# Patient Record
Sex: MALE | Race: WHITE | Employment: OTHER | URBAN - METROPOLITAN AREA
[De-identification: names, ages, dates, MRNs, and addresses within clinical notes are randomized per-mention and may not be internally consistent; named-entity substitution may affect disease eponyms.]

---

## 2020-06-10 ENCOUNTER — HOSPITAL ENCOUNTER (EMERGENCY)
Age: 80
Discharge: HOME | End: 2020-06-10
Attending: EMERGENCY MEDICINE
Payer: OTHER MISCELLANEOUS

## 2020-06-10 VITALS
HEART RATE: 87 BPM | BODY MASS INDEX: 31.5 KG/M2 | SYSTOLIC BLOOD PRESSURE: 128 MMHG | HEIGHT: 70 IN | OXYGEN SATURATION: 96 % | WEIGHT: 220 LBS | TEMPERATURE: 98.1 F | DIASTOLIC BLOOD PRESSURE: 60 MMHG | RESPIRATION RATE: 18 BRPM

## 2020-06-10 PROCEDURE — 90471 IMMUNIZATION ADMIN: CPT | Performed by: EMERGENCY MEDICINE

## 2020-06-10 PROCEDURE — 90715 TDAP VACCINE 7 YRS/> IM: CPT | Performed by: EMERGENCY MEDICINE

## 2020-06-10 PROCEDURE — 6360000002 HC RX W HCPCS: Performed by: EMERGENCY MEDICINE

## 2020-06-10 PROCEDURE — 99283 EMERGENCY DEPT VISIT LOW MDM: CPT

## 2020-06-10 RX ORDER — ASPIRIN 81 MG/1
81 TABLET, CHEWABLE ORAL DAILY
COMMUNITY

## 2020-06-10 RX ORDER — FUROSEMIDE 40 MG/1
40 TABLET ORAL 2 TIMES DAILY
COMMUNITY

## 2020-06-10 RX ORDER — GABAPENTIN 300 MG/1
300 CAPSULE ORAL 3 TIMES DAILY
COMMUNITY

## 2020-06-10 RX ORDER — LOSARTAN POTASSIUM 100 MG/1
100 TABLET ORAL DAILY
COMMUNITY

## 2020-06-10 RX ORDER — CELECOXIB 100 MG/1
100 CAPSULE ORAL 2 TIMES DAILY
COMMUNITY

## 2020-06-10 RX ORDER — METOPROLOL TARTRATE 100 MG/1
100 TABLET ORAL 2 TIMES DAILY
COMMUNITY

## 2020-06-10 RX ORDER — TAMSULOSIN HYDROCHLORIDE 0.4 MG/1
0.4 CAPSULE ORAL DAILY
COMMUNITY

## 2020-06-10 RX ORDER — ACETAMINOPHEN 160 MG
TABLET,DISINTEGRATING ORAL
COMMUNITY

## 2020-06-10 RX ORDER — DIAPER,BRIEF,INFANT-TODD,DISP
EACH MISCELLANEOUS 2 TIMES DAILY
Status: DISCONTINUED | OUTPATIENT
Start: 2020-06-10 | End: 2020-06-10 | Stop reason: HOSPADM

## 2020-06-10 RX ORDER — WARFARIN SODIUM 2.5 MG/1
2.5 TABLET ORAL
COMMUNITY

## 2020-06-10 RX ORDER — ROSUVASTATIN CALCIUM 10 MG/1
10 TABLET, COATED ORAL DAILY
COMMUNITY

## 2020-06-10 RX ORDER — AMLODIPINE BESYLATE 5 MG/1
5 TABLET ORAL DAILY
COMMUNITY

## 2020-06-10 RX ADMIN — TETANUS TOXOID, REDUCED DIPHTHERIA TOXOID AND ACELLULAR PERTUSSIS VACCINE, ADSORBED 0.5 ML: 5; 2.5; 8; 8; 2.5 SUSPENSION INTRAMUSCULAR at 18:01

## 2020-06-10 ASSESSMENT — ENCOUNTER SYMPTOMS
VOMITING: 0
CHEST TIGHTNESS: 0
CONSTIPATION: 0
EYE REDNESS: 0
CHOKING: 0
TROUBLE SWALLOWING: 0
VOICE CHANGE: 0
COUGH: 0
SORE THROAT: 0
ABDOMINAL PAIN: 0
EYE PAIN: 0
BACK PAIN: 0
FACIAL SWELLING: 0
SINUS PRESSURE: 0
STRIDOR: 0
BLOOD IN STOOL: 0
DIARRHEA: 0
SHORTNESS OF BREATH: 0
WHEEZING: 0
EYE DISCHARGE: 0

## 2020-06-10 ASSESSMENT — PAIN DESCRIPTION - ORIENTATION: ORIENTATION: LEFT;LOWER

## 2020-06-10 ASSESSMENT — PAIN DESCRIPTION - PAIN TYPE: TYPE: ACUTE PAIN

## 2020-06-10 ASSESSMENT — PAIN DESCRIPTION - DESCRIPTORS: DESCRIPTORS: ACHING

## 2020-06-10 ASSESSMENT — PAIN SCALES - GENERAL: PAINLEVEL_OUTOF10: 1

## 2020-06-10 ASSESSMENT — PAIN DESCRIPTION - LOCATION: LOCATION: BACK;ELBOW

## 2020-06-10 ASSESSMENT — PAIN DESCRIPTION - FREQUENCY: FREQUENCY: CONTINUOUS
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
## 2023-02-09 PROBLEM — F41.9 ANXIETY DISORDER: Status: ACTIVE | Noted: 2023-02-08

## 2023-02-09 PROBLEM — L73.9 ACUTE FOLLICULITIS: Status: ACTIVE | Noted: 2023-02-08

## 2023-02-09 PROBLEM — M77.8 SHOULDER CAPSULITIS, RIGHT: Status: ACTIVE | Noted: 2023-02-09

## 2023-02-09 PROBLEM — M25.551 RIGHT HIP PAIN: Status: ACTIVE | Noted: 2023-02-09
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
## 2023-02-09 PROBLEM — M70.62 TROCHANTERIC BURSITIS OF LEFT HIP: Status: ACTIVE | Noted: 2023-02-09

## 2023-02-09 PROBLEM — M47.22 OSTEOARTHRITIS OF SPINE WITH RADICULOPATHY, CERVICAL REGION: Status: ACTIVE | Noted: 2023-02-09

## 2023-02-09 PROBLEM — H61.23 BILATERAL IMPACTED CERUMEN: Status: ACTIVE | Noted: 2023-02-08

## 2023-02-09 PROBLEM — J04.0 LARYNGITIS: Status: ACTIVE | Noted: 2023-02-09

## 2023-02-09 PROBLEM — M65.9 TENOSYNOVITIS OF WRIST: Status: ACTIVE | Noted: 2023-02-09

## 2023-02-09 PROBLEM — E87.6 HYPOKALEMIA: Status: ACTIVE | Noted: 2023-02-08

## 2023-02-09 PROBLEM — C43.9 MELANOMA (MULTI): Status: ACTIVE | Noted: 2023-02-09

## 2023-02-09 PROBLEM — R26.9 ABNORMAL GAIT: Status: ACTIVE | Noted: 2023-02-08

## 2023-02-09 PROBLEM — M25.552 PAIN, JOINT, HIP, LEFT: Status: ACTIVE | Noted: 2023-02-09

## 2023-02-09 PROBLEM — H26.9 CATARACT, LEFT EYE: Status: ACTIVE | Noted: 2023-02-08

## 2023-02-09 PROBLEM — M25.539 WRIST PAIN: Status: ACTIVE | Noted: 2023-02-09

## 2023-02-09 PROBLEM — M48.061 SPINAL STENOSIS OF LUMBAR REGION: Status: ACTIVE | Noted: 2023-02-09

## 2023-02-09 PROBLEM — M25.432 SWELLING OF JOINT OF BOTH WRISTS: Status: ACTIVE | Noted: 2023-02-09

## 2023-02-09 PROBLEM — D23.9 DYSPLASTIC NEVUS: Status: ACTIVE | Noted: 2023-02-08

## 2023-02-09 PROBLEM — R21 RASH OF GROIN: Status: ACTIVE | Noted: 2023-02-08

## 2023-02-09 PROBLEM — Z98.818 OTHER DENTAL PROCEDURE STATUS: Status: ACTIVE | Noted: 2023-02-09

## 2023-02-09 PROBLEM — S16.1XXA STRAIN OF NECK MUSCLE: Status: ACTIVE | Noted: 2023-02-09

## 2023-02-09 PROBLEM — E11.9 DIABETES MELLITUS TYPE 2, UNCOMPLICATED (MULTI): Status: ACTIVE | Noted: 2023-02-08

## 2023-02-09 PROBLEM — M25.469 EFFUSION OF KNEE JOINT: Status: ACTIVE | Noted: 2023-02-08

## 2023-02-09 PROBLEM — M79.7 FIBROMYALGIA: Status: ACTIVE | Noted: 2023-02-08

## 2023-02-09 PROBLEM — M54.32 LEFT SIDED SCIATICA: Status: ACTIVE | Noted: 2023-02-09

## 2023-02-09 PROBLEM — S50.312A ABRASION OF LEFT ELBOW: Status: ACTIVE | Noted: 2023-02-08

## 2023-02-09 PROBLEM — K60.2 RECTAL FISSURE: Status: ACTIVE | Noted: 2023-02-09

## 2023-02-09 PROBLEM — B07.8 COMMON WART: Status: ACTIVE | Noted: 2023-02-09

## 2023-02-09 PROBLEM — R53.83 FATIGUE: Status: ACTIVE | Noted: 2023-02-08

## 2023-02-09 PROBLEM — E55.9 VITAMIN D DEFICIENCY: Status: ACTIVE | Noted: 2023-02-09

## 2023-02-09 PROBLEM — M65.939 TENOSYNOVITIS OF WRIST: Status: ACTIVE | Noted: 2023-02-09

## 2023-02-09 PROBLEM — I10 HYPERTENSION: Status: ACTIVE | Noted: 2023-02-08

## 2023-02-09 PROBLEM — B07.9 VIRAL WART, UNSPECIFIED: Status: ACTIVE | Noted: 2023-02-09

## 2023-02-09 PROBLEM — K64.9 BLEEDING HEMORRHOIDS: Status: ACTIVE | Noted: 2023-02-08

## 2023-02-09 PROBLEM — R22.9 MULTIPLE SKIN NODULES: Status: ACTIVE | Noted: 2023-02-08

## 2023-02-09 PROBLEM — K62.89 OTHER SPECIFIED DISEASES OF ANUS AND RECTUM: Status: ACTIVE | Noted: 2023-02-09

## 2023-02-09 PROBLEM — S40.012A CONTUSION OF LEFT SHOULDER: Status: ACTIVE | Noted: 2023-02-08

## 2023-02-09 PROBLEM — Z85.820 HISTORY OF MALIGNANT MELANOMA OF SKIN: Status: ACTIVE | Noted: 2023-02-08

## 2023-02-09 PROBLEM — M54.17 LUMBOSACRAL RADICULITIS: Status: ACTIVE | Noted: 2023-02-09
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
## 2023-02-09 PROBLEM — B07.9 VERRUCOUS SKIN LESION: Status: ACTIVE | Noted: 2023-02-09

## 2023-02-09 PROBLEM — R60.0 BILATERAL LEG EDEMA: Status: ACTIVE | Noted: 2023-02-08

## 2023-02-09 PROBLEM — J01.00 ACUTE MAXILLARY SINUSITIS: Status: ACTIVE | Noted: 2023-02-08

## 2023-02-09 PROBLEM — J18.0 BRONCHOPNEUMONIA: Status: ACTIVE | Noted: 2023-02-08

## 2023-02-09 PROBLEM — M25.431 SWELLING OF JOINT OF BOTH WRISTS: Status: ACTIVE | Noted: 2023-02-09

## 2023-02-09 PROBLEM — E78.5 DYSLIPIDEMIA: Status: ACTIVE | Noted: 2023-02-08

## 2023-02-09 PROBLEM — L91.8 SKIN TAG: Status: ACTIVE | Noted: 2023-02-09

## 2023-02-09 PROBLEM — Z98.890 STATUS POST LUMBAR SPINE OPERATIVE PROCEDURE FOR DECOMPRESSION OF SPINAL CORD: Status: ACTIVE | Noted: 2023-02-09

## 2023-02-09 PROBLEM — I25.10 CORONARY ARTERY DISEASE: Status: ACTIVE | Noted: 2023-02-08

## 2023-02-09 PROBLEM — S23.9XXA SPRAIN OF THORACIC REGION: Status: ACTIVE | Noted: 2023-02-09

## 2023-02-09 PROBLEM — R05.9 COUGH: Status: ACTIVE | Noted: 2023-02-08

## 2023-02-09 PROBLEM — I50.32 CHRONIC DIASTOLIC CONGESTIVE HEART FAILURE (MULTI): Status: ACTIVE | Noted: 2023-02-08

## 2023-02-09 PROBLEM — M77.8 THUMB TENDONITIS: Status: ACTIVE | Noted: 2023-02-09

## 2023-02-09 PROBLEM — M19.90 INFLAMMATORY ARTHRITIS: Status: ACTIVE | Noted: 2023-02-09

## 2023-02-09 PROBLEM — L72.3 SEBACEOUS CYST OF EAR: Status: ACTIVE | Noted: 2023-02-09

## 2023-02-09 PROBLEM — M25.512 LEFT SHOULDER PAIN: Status: ACTIVE | Noted: 2023-02-09

## 2023-02-09 PROBLEM — K62.5 RECTAL BLEEDING: Status: ACTIVE | Noted: 2023-02-09

## 2023-02-09 PROBLEM — Z95.5 HISTORY OF CORONARY ARTERY STENT PLACEMENT: Status: ACTIVE | Noted: 2023-02-08

## 2023-02-09 PROBLEM — M99.01 CERVICAL SEGMENT DYSFUNCTION: Status: ACTIVE | Noted: 2023-02-08

## 2023-02-09 PROBLEM — I34.0 MODERATE MITRAL REGURGITATION: Status: ACTIVE | Noted: 2023-02-09

## 2023-02-09 PROBLEM — S51.812A SKIN TEAR OF LEFT FOREARM WITHOUT COMPLICATION: Status: ACTIVE | Noted: 2023-02-09

## 2023-02-09 PROBLEM — M17.11 RIGHT KNEE DJD: Status: ACTIVE | Noted: 2023-02-09

## 2023-02-09 PROBLEM — J18.9 PNEUMONIA: Status: ACTIVE | Noted: 2023-02-08

## 2023-02-09 PROBLEM — E53.8 VITAMIN B12 DEFICIENCY: Status: ACTIVE | Noted: 2023-02-09

## 2023-02-09 PROBLEM — M46.1 SACROILIITIS (CMS-HCC): Status: ACTIVE | Noted: 2023-02-09

## 2023-02-09 PROBLEM — E78.5 HYPERLIPIDEMIA: Status: ACTIVE | Noted: 2023-02-08

## 2023-02-09 PROBLEM — I35.0 MILD AORTIC STENOSIS: Status: ACTIVE | Noted: 2023-02-09

## 2023-02-09 PROBLEM — R10.11 ABDOMINAL PAIN, RUQ (RIGHT UPPER QUADRANT): Status: ACTIVE | Noted: 2023-02-08

## 2023-02-09 PROBLEM — H25.093 AGE-RELATED INCIPIENT CATARACT OF BOTH EYES: Status: ACTIVE | Noted: 2023-02-08

## 2023-02-09 PROBLEM — L03.311 CELLULITIS OF RIGHT ABDOMINAL WALL: Status: ACTIVE | Noted: 2023-02-08

## 2023-02-09 PROBLEM — M25.512 ACUTE PAIN OF LEFT SHOULDER: Status: ACTIVE | Noted: 2023-02-09

## 2023-02-09 PROBLEM — I48.0 PAROXYSMAL ATRIAL FIBRILLATION (MULTI): Status: ACTIVE | Noted: 2023-02-09

## 2023-02-09 PROBLEM — M10.9 GOUT, ARTHRITIS: Status: ACTIVE | Noted: 2023-02-08

## 2023-02-09 PROBLEM — N40.0 BPH (BENIGN PROSTATIC HYPERPLASIA): Status: ACTIVE | Noted: 2023-02-08

## 2023-02-09 PROBLEM — L57.0 BENIGN KERATOSIS: Status: ACTIVE | Noted: 2023-02-08

## 2023-02-09 PROBLEM — B35.6 TINEA CRURIS: Status: ACTIVE | Noted: 2023-02-08

## 2023-02-09 PROBLEM — J06.9 UPPER RESPIRATORY INFECTION WITH COUGH AND CONGESTION: Status: ACTIVE | Noted: 2023-02-09

## 2023-02-09 PROBLEM — F45.21 HYPOCHONDRIASIS: Status: ACTIVE | Noted: 2023-02-08

## 2023-02-09 PROBLEM — L98.499: Status: ACTIVE | Noted: 2023-02-09

## 2023-02-09 PROBLEM — K59.00 CONSTIPATION: Status: ACTIVE | Noted: 2023-02-08

## 2023-02-09 PROBLEM — S39.012A LUMBAR STRAIN: Status: ACTIVE | Noted: 2023-02-09

## 2023-02-09 PROBLEM — E11.40 DIABETIC NEUROPATHY (MULTI): Status: ACTIVE | Noted: 2023-02-08

## 2023-02-09 PROBLEM — Z86.19: Status: ACTIVE | Noted: 2023-02-08

## 2023-02-09 PROBLEM — M77.8 LEFT ELBOW TENDINITIS: Status: ACTIVE | Noted: 2023-02-09

## 2023-02-09 PROBLEM — M25.511 RIGHT SHOULDER PAIN: Status: ACTIVE | Noted: 2023-02-09

## 2023-02-09 PROBLEM — S63.509A SPRAIN OF WRIST: Status: ACTIVE | Noted: 2023-02-09

## 2023-02-09 RX ORDER — METOPROLOL SUCCINATE 50 MG/1
1 TABLET, EXTENDED RELEASE ORAL
COMMUNITY
Start: 2016-02-25 | End: 2023-05-08

## 2023-02-09 RX ORDER — LOSARTAN POTASSIUM 100 MG/1
1 TABLET ORAL DAILY
COMMUNITY
Start: 2015-06-17 | End: 2023-08-16 | Stop reason: SDUPTHER

## 2023-02-09 RX ORDER — NITROGLYCERIN 0.4 MG/1
0.4 TABLET SUBLINGUAL EVERY 5 MIN PRN
COMMUNITY

## 2023-02-09 RX ORDER — NYSTATIN 100000 [USP'U]/G
POWDER TOPICAL
COMMUNITY
End: 2023-10-10 | Stop reason: ALTCHOICE

## 2023-02-09 RX ORDER — ROSUVASTATIN CALCIUM 10 MG/1
TABLET, COATED ORAL
COMMUNITY
End: 2023-08-16 | Stop reason: SDUPTHER

## 2023-02-09 RX ORDER — METOPROLOL SUCCINATE 100 MG/1
1 TABLET, EXTENDED RELEASE ORAL NIGHTLY
COMMUNITY
Start: 2020-01-13 | End: 2023-05-08

## 2023-02-09 RX ORDER — BLOOD SUGAR DIAGNOSTIC
STRIP MISCELLANEOUS 2 TIMES DAILY
COMMUNITY
Start: 2016-05-17 | End: 2023-10-24 | Stop reason: SDUPTHER

## 2023-02-09 RX ORDER — WARFARIN 4 MG/1
1 TABLET ORAL
COMMUNITY
Start: 2021-04-22 | End: 2023-10-10 | Stop reason: ALTCHOICE

## 2023-02-09 RX ORDER — ALBUTEROL SULFATE 0.83 MG/ML
SOLUTION RESPIRATORY (INHALATION) 4 TIMES DAILY
COMMUNITY
Start: 2021-05-12 | End: 2023-10-10 | Stop reason: ALTCHOICE

## 2023-02-09 RX ORDER — TAMSULOSIN HYDROCHLORIDE 0.4 MG/1
1 CAPSULE ORAL DAILY
COMMUNITY
Start: 2014-11-04 | End: 2023-05-16 | Stop reason: SDUPTHER

## 2023-02-09 RX ORDER — WARFARIN SODIUM 5 MG/1
TABLET ORAL
COMMUNITY
Start: 2020-03-20 | End: 2023-07-05

## 2023-02-09 RX ORDER — CLOTRIMAZOLE AND BETAMETHASONE DIPROPIONATE 10; .64 MG/G; MG/G
CREAM TOPICAL 2 TIMES DAILY
COMMUNITY
Start: 2014-08-05 | End: 2023-10-10 | Stop reason: ALTCHOICE

## 2023-02-09 RX ORDER — SITAGLIPTIN AND METFORMIN HYDROCHLORIDE 1000; 50 MG/1; MG/1
1 TABLET, FILM COATED ORAL 2 TIMES DAILY
COMMUNITY
Start: 2021-12-01

## 2023-02-09 RX ORDER — GABAPENTIN 600 MG/1
1 TABLET ORAL 3 TIMES DAILY
COMMUNITY
Start: 2020-11-19 | End: 2023-05-08

## 2023-02-09 RX ORDER — MUPIROCIN 20 MG/G
OINTMENT TOPICAL 3 TIMES DAILY
COMMUNITY
Start: 2020-07-20 | End: 2023-10-10 | Stop reason: ALTCHOICE

## 2023-02-09 RX ORDER — CHOLECALCIFEROL (VITAMIN D3) 50 MCG
1 TABLET ORAL DAILY
COMMUNITY

## 2023-02-09 RX ORDER — ASPIRIN 81 MG/1
1 TABLET ORAL DAILY
COMMUNITY
End: 2023-05-15 | Stop reason: SDUPTHER

## 2023-02-09 RX ORDER — HYDROCORTISONE 25 MG/G
CREAM TOPICAL
COMMUNITY
Start: 2021-07-13 | End: 2023-10-10 | Stop reason: ALTCHOICE

## 2023-02-09 RX ORDER — FUROSEMIDE 40 MG/1
1 TABLET ORAL 2 TIMES DAILY
COMMUNITY
Start: 2020-01-13 | End: 2023-10-19

## 2023-02-26 ENCOUNTER — NURSE TRIAGE (OUTPATIENT)
Dept: OTHER | Facility: CLINIC | Age: 83
End: 2023-02-26

## 2023-02-27 NOTE — TELEPHONE ENCOUNTER
Location of patient: [place]    Subjective: Caller states pt states right eye is blood shot red. Pt states no swelling in eye. Denies vision issues. Denies itching. Pt is on Coumadin. Pt states he feels like something is in eye but has not tried to irrigate it yet. Current Symptoms: As above    Onset:  today    Associated Symptoms: NA    Pain Severity: 2/10; aching; intermittent    Temperature: Denies     What has been tried: Nothing at this time    LMP: NA Pregnant: No    Recommended disposition: See PCP within 24 Hours    Care advice provided, patient verbalizes understanding; denies any other questions or concerns; instructed to call back for any new or worsening symptoms. Pt has decided to go to ED and is already in the parking lot during call. This triage is a result of a call to [address] [place] [address]. Please do not respond to the triage nurse through this encounter. Any subsequent communication should be directly with the patient.       Reason for Disposition   [1] Bleeding on white of the eye AND [2] taking Coumadin (warfarin) or other strong blood thinner, or known bleeding disorder (e.g., thrombocytopenia)    Protocols used: Eye - Red Without Pus-ADULT-

## 2023-03-22 ENCOUNTER — OFFICE VISIT (OUTPATIENT)
Dept: PRIMARY CARE | Facility: CLINIC | Age: 83
End: 2023-03-22
Payer: MEDICARE

## 2023-03-22 VITALS
SYSTOLIC BLOOD PRESSURE: 118 MMHG | WEIGHT: 221 LBS | HEART RATE: 72 BPM | TEMPERATURE: 97.6 F | BODY MASS INDEX: 31.64 KG/M2 | OXYGEN SATURATION: 97 % | HEIGHT: 70 IN | RESPIRATION RATE: 16 BRPM | DIASTOLIC BLOOD PRESSURE: 68 MMHG

## 2023-03-22 DIAGNOSIS — I48.0 PAROXYSMAL ATRIAL FIBRILLATION (MULTI): ICD-10-CM

## 2023-03-22 DIAGNOSIS — E11.9 TYPE 2 DIABETES MELLITUS WITHOUT COMPLICATION, WITHOUT LONG-TERM CURRENT USE OF INSULIN (MULTI): Primary | ICD-10-CM

## 2023-03-22 DIAGNOSIS — Z95.5 HISTORY OF CORONARY ARTERY STENT PLACEMENT: ICD-10-CM

## 2023-03-22 DIAGNOSIS — I50.32 CHRONIC DIASTOLIC CONGESTIVE HEART FAILURE (MULTI): ICD-10-CM

## 2023-03-22 LAB
HBA1C MFR BLD: 8.4 % (ref 4.2–6.5)
POC FINGERSTICK BLOOD GLUCOSE: 178 MG/DL (ref 70–100)
POC INR: 2.8 (ref 0.9–1.1)

## 2023-03-22 PROCEDURE — 85610 PROTHROMBIN TIME: CPT | Performed by: FAMILY MEDICINE

## 2023-03-22 PROCEDURE — 82962 GLUCOSE BLOOD TEST: CPT | Performed by: FAMILY MEDICINE

## 2023-03-22 PROCEDURE — 1036F TOBACCO NON-USER: CPT | Performed by: FAMILY MEDICINE

## 2023-03-22 PROCEDURE — 3074F SYST BP LT 130 MM HG: CPT | Performed by: FAMILY MEDICINE

## 2023-03-22 PROCEDURE — 3078F DIAST BP <80 MM HG: CPT | Performed by: FAMILY MEDICINE

## 2023-03-22 PROCEDURE — 83036 HEMOGLOBIN GLYCOSYLATED A1C: CPT | Performed by: FAMILY MEDICINE

## 2023-03-22 PROCEDURE — 99214 OFFICE O/P EST MOD 30 MIN: CPT | Performed by: FAMILY MEDICINE

## 2023-03-22 RX ORDER — FUROSEMIDE 40 MG/1
40 TABLET ORAL
COMMUNITY
End: 2023-10-10 | Stop reason: SDUPTHER

## 2023-03-22 ASSESSMENT — PAIN SCALES - GENERAL: PAINLEVEL: 8

## 2023-03-22 NOTE — PROGRESS NOTES
Subjective   Trell Aguila is a 82 y.o. male who presents for Follow-up (Coumadin check today, glucose and A1C today).  HPI patient is an 82-year-old male who has multiple medical problems and is concerned about his diabetes, his Coumadin and INR and he recently had a bad bronchitis 2 weeks ago and I called with an antibiotic ask for him.  He states he is over the bronchitis and is feeling much better but he still has some phlegm production.  He has a history of asthma and asthmatic bronchitis but his asthma has been stable.  He needs a blood sugar and A1c today and also his Coumadin and INR.  He also had to see his eye doctor 3 weeks ago since he had a red right eye which was probably from his Coumadin.  That has completely cleared and he did not have any visual problems.  Patient also had lumbar spine surgery 2 years ago and had a fusion and does have screws and rods and currently ambulates with a cane.  He does have a slight left foot drop and he has to be cautious that he does not trip or fall.  Patient also follows closely with cardiology due to paroxysmal atrial fibrillation and he has had some stents placed in the past.      Objective ROS  ;10 systems were reviewed and the information is included in the HPI and no additional review of systems is indicated.    Physical Exam  Vitals and nursing note reviewed.   Constitutional:       Appearance: Normal appearance. He is obese.   HENT:      Head: Normocephalic.      Right Ear: Tympanic membrane and external ear normal.      Left Ear: Tympanic membrane and external ear normal.      Nose: Nose normal.      Mouth/Throat:      Mouth: Mucous membranes are moist.      Pharynx: Oropharynx is clear.   Eyes:      Extraocular Movements: Extraocular movements intact.      Conjunctiva/sclera: Conjunctivae normal.      Pupils: Pupils are equal, round, and reactive to light.   Neck:      Comments: Occasional neck spasm and restriction of motion secondary to stress and  tension.  Cardiovascular:      Rate and Rhythm: Normal rate. Rhythm irregular.      Pulses: Normal pulses.      Heart sounds: Normal heart sounds.      Comments: Paroxysmal atrial fibrillation has had stents placed in the past and does follow closely with cardiology.  He is on Coumadin.  Pulmonary:      Effort: Pulmonary effort is normal.      Comments: Long history of asthma, onset few expiratory rhonchi in the bases and few wheezes in the upper lobes.  Patient had bronchitis 2 weeks ago.  Abdominal:      General: Abdomen is flat. Bowel sounds are normal.      Palpations: Abdomen is soft.      Comments: Abdomen is soft and obese nontender to palpation.  No masses were noted.   Genitourinary:     Comments: Not examined  Musculoskeletal:         General: Normal range of motion.      Cervical back: Normal range of motion.      Right lower leg: Edema present.      Left lower leg: Edema present.      Comments: 2 years ago he had extensive lumbar spine surgery and had screws and rods placed and he is ambulating better less pain but must use a cane or walker at all times.  He also has history of left foot drop.  He has to be cautious that he does not trip and fall.  Occasional mild ankle edema when he is on his feet too long.  Also has trigger fingers on both hands.  They have been injected in the past.   Skin:     General: Skin is warm.      Findings: Lesion present.      Comments: He denies any skin rashes or abnormal skin growths.  He did have a malignant melanoma on his back that was removed probably 10 years ago.   Neurological:      General: No focal deficit present.      Mental Status: He is alert and oriented to person, place, and time. Mental status is at baseline.      Comments: No neurosensory deficits are noted.  Normal reflexes upper and lower extremities.   Psychiatric:         Mood and Affect: Mood normal.         Behavior: Behavior normal.         Thought Content: Thought content normal.         Judgment:  Judgment normal.      Comments: Patient does suffer with hypochondriasis and worries about health issues consistently.  His wife gets tired of hearing his complaints and he does worry about every little ache and pain, this has become a chronic issue for him.     PLAN; patient is an 82-year-old male who was evaluated today for several problems and concerns.  He did have his blood sugar checked and it was 178 and his A1c was up to 8.4%.  He needs to get back on his diet and some weight and lower his sugar.  Coumadin INR was 2.8% which is very good.  Patient was sick 2 to 3 weeks ago and had bronchitis and he was not watching his diet when he was sick.  He also had a red eye 3 or 4 weeks ago and he did see his eye doctor but that did resolve.  He had lumbar spine surgery 2 years ago and has chronic left foot drop.  He has to be careful he does not trip and fall and he should always use his cane or walker.  Patient otherwise is doing well and will follow-up in 1 to 2 months or sooner if needed.  I tried to reinforce all the positive factors in his health but he already seems to do well on negative pack.  And worries about every level of aching pain.  He has had a lot of trouble with his right knee but he is not a candidate for knee surgery.    Problem List Items Addressed This Visit          Circulatory    Chronic diastolic congestive heart failure (CMS/HCC)    Relevant Orders    POCT INR manually resulted (Completed)    Paroxysmal atrial fibrillation (CMS/[facility])    Relevant Orders    POCT INR manually resulted (Completed)       Endocrine/Metabolic    Diabetes mellitus type 2, uncomplicated (CMS/[facility]) - Primary    Relevant Orders    POCT fingerstick glucose manually resulted (Completed)    POCT Glycosylated Hemoglobin (HGB A1C) docked device (Completed)       Other    History of coronary artery stent placement    Relevant Orders    POCT INR manually resulted (Completed)            Luis Newman, DO

## 2023-04-18 ENCOUNTER — OFFICE VISIT (OUTPATIENT)
Dept: PRIMARY CARE | Facility: CLINIC | Age: 83
End: 2023-04-18
Payer: MEDICARE

## 2023-04-18 VITALS
BODY MASS INDEX: 31.5 KG/M2 | HEIGHT: 70 IN | TEMPERATURE: 97.6 F | RESPIRATION RATE: 18 BRPM | HEART RATE: 67 BPM | DIASTOLIC BLOOD PRESSURE: 62 MMHG | OXYGEN SATURATION: 97 % | SYSTOLIC BLOOD PRESSURE: 115 MMHG | WEIGHT: 220 LBS

## 2023-04-18 DIAGNOSIS — I50.32 CHRONIC DIASTOLIC CONGESTIVE HEART FAILURE (MULTI): ICD-10-CM

## 2023-04-18 DIAGNOSIS — E11.9 TYPE 2 DIABETES MELLITUS WITHOUT COMPLICATION, WITH LONG-TERM CURRENT USE OF INSULIN (MULTI): ICD-10-CM

## 2023-04-18 DIAGNOSIS — Z79.4 TYPE 2 DIABETES MELLITUS WITHOUT COMPLICATION, WITH LONG-TERM CURRENT USE OF INSULIN (MULTI): ICD-10-CM

## 2023-04-18 LAB
POC FINGERSTICK BLOOD GLUCOSE: 160 MG/DL (ref 70–100)
POC INR: 2 (ref 0.9–1.1)

## 2023-04-18 PROCEDURE — 1036F TOBACCO NON-USER: CPT | Performed by: FAMILY MEDICINE

## 2023-04-18 PROCEDURE — 1159F MED LIST DOCD IN RCRD: CPT | Performed by: FAMILY MEDICINE

## 2023-04-18 PROCEDURE — 82962 GLUCOSE BLOOD TEST: CPT | Performed by: FAMILY MEDICINE

## 2023-04-18 PROCEDURE — 3074F SYST BP LT 130 MM HG: CPT | Performed by: FAMILY MEDICINE

## 2023-04-18 PROCEDURE — 85610 PROTHROMBIN TIME: CPT | Performed by: FAMILY MEDICINE

## 2023-04-18 PROCEDURE — 99214 OFFICE O/P EST MOD 30 MIN: CPT | Performed by: FAMILY MEDICINE

## 2023-04-18 PROCEDURE — 3078F DIAST BP <80 MM HG: CPT | Performed by: FAMILY MEDICINE

## 2023-04-18 ASSESSMENT — PAIN SCALES - GENERAL: PAINLEVEL: 8

## 2023-04-18 NOTE — PROGRESS NOTES
Subjective   Trell Aguila is a 82 y.o. male who presents for Follow-up.    HPI  ; patient is a 82-year-old diabetic male who is being evaluated today for several concerns and problems.  He did see the orthopedic hand surgeon this past Friday and he is having surgery and a trigger finger left hand.  There is swelling and pain at the left base of the index finger and the left fifth digit.  He has trouble making a fist and the tendons do get stuck.  He will have to stop his Coumadin 5 days before that.  Surgery is scheduled for April 26 as an outpatient.  Patient's blood sugar today was 160 and his INR was 2.0.  Patient is otherwise stable but still has his chronic back problems since he had a lumbar fusion 2 years ago and he has problems with his right knee.      Objective ROS  ; 10 systems were reviewed and the information is included in the HPI and no additional review of systems is indicated.    Physical Exam  Vitals and nursing note reviewed.   Constitutional:       Appearance: Normal appearance. He is obese.      Comments: Patient is alert and oriented in no acute distress.  He is always trying to watch his diet and lose weight due to his diabetes.   HENT:      Head: Normocephalic.      Right Ear: Tympanic membrane and external ear normal.      Left Ear: Tympanic membrane and external ear normal.      Nose: Nose normal.      Mouth/Throat:      Mouth: Mucous membranes are moist.      Pharynx: Oropharynx is clear.   Eyes:      Extraocular Movements: Extraocular movements intact.      Conjunctiva/sclera: Conjunctivae normal.      Pupils: Pupils are equal, round, and reactive to light.   Neck:      Comments: Cervical degenerative disc disease and restriction of motion.  He does have some cervical neuritis into the shoulders and upper arms.  Cardiovascular:      Rate and Rhythm: Normal rate. Rhythm irregular.      Pulses: Normal pulses.      Heart sounds: Normal heart sounds.      Comments: Patient has stents placed  by cardiology 15 years ago, he does have paroxysmal atrial fibrillation and is on Coumadin.  He denies any chest pain or palpitations.  Pulmonary:      Effort: Pulmonary effort is normal.      Comments: Patient does have a history of asthma and asthmatic bronchitis when he catches a cold.  Currently his lungs are clear and he has breathing well on his own.  He occasionally does home nebulizer treatments for congestion.  Abdominal:      General: Bowel sounds are normal.      Palpations: Abdomen is soft.      Comments: Abdomen is soft and obese, nontender, no hepatosplenomegaly.  Patient denies any rebound tenderness or guarding.   Genitourinary:     Comments: Not examined  Musculoskeletal:         General: Tenderness present. Normal range of motion.      Cervical back: Normal range of motion.      Comments: Restricted range of motion lumbar spine after lumbar spinal fusion at 5 levels 2 years ago.  He still has to ambulate with a cane or walker due to some balance issues.  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  He is having surgery on April 26 for left index finger trigger finger, and left fifth digit trigger finger.   Skin:     General: Skin is warm and dry.      Findings: Lesion present.   Neurological:      General: No focal deficit present.      Mental Status: He is alert and oriented to person, place, and time. Mental status is at baseline.      Coordination: Coordination abnormal.      Gait: Gait abnormal.      Comments: Patient does have a chronic foot drop which does affect his ambulation at that started prior to his lumbar back fusion 2 years ago.  He does have some cervical neuritis but does function well with his arms.  Does have some gait and coordination difficulties due to his foot drop.   Psychiatric:         Behavior: Behavior normal.         Thought Content: Thought content normal.         Judgment: Judgment normal.      Comments: Patient has  a lot of anxiety concerning health issues and worries about everything.  He has OCD behavior concerning his health and his diabetes.  He has no depression but judgment is somewhat poor and he relies on his wife to answer most of his questions.     PLAN  ; patient is an 82-year-old male who was evaluated today for several problems and concerns.  He is having left hand surgery April 26 by hand surgeon Dr. Bernardo.  He has a left index finger trigger finger and a left fifth digit trigger finger.  He will stop his Coumadin 5 days before.  His sugar was stable at 60 and his Coumadin was 2.0.  Patient had many questions to ask prior to his surgery next week.  His wife was present and I tried to answer all his questions.  He has a lot of anxiety and worries about everything.  He also wondered if his right knee could have a cortisone shot and I told him that prior to his surgery next Wednesday.  Patient otherwise has to go to preadmission testing next week and he currently is stable from my perspective.  He will follow-up after his surgery in the near future.    Problem List Items Addressed This Visit          Circulatory    Chronic diastolic congestive heart failure (CMS/HCC)    Relevant Orders    POCT INR manually resulted (Completed)       Endocrine/Metabolic    Diabetes mellitus type 2, uncomplicated (CMS/HCC)    Relevant Orders    POCT fingerstick glucose manually resulted (Completed)            Luis Newman,

## 2023-05-08 DIAGNOSIS — G62.9 NEUROPATHY: ICD-10-CM

## 2023-05-08 DIAGNOSIS — I10 PRIMARY HYPERTENSION: Primary | ICD-10-CM

## 2023-05-08 RX ORDER — METOPROLOL SUCCINATE 100 MG/1
TABLET, EXTENDED RELEASE ORAL
Qty: 90 TABLET | Refills: 3 | Status: SHIPPED | OUTPATIENT
Start: 2023-05-08 | End: 2023-05-16 | Stop reason: SDUPTHER

## 2023-05-08 RX ORDER — GABAPENTIN 600 MG/1
TABLET ORAL
Qty: 270 TABLET | Refills: 3 | Status: SHIPPED | OUTPATIENT
Start: 2023-05-08 | End: 2023-05-16 | Stop reason: SDUPTHER

## 2023-05-08 RX ORDER — METOPROLOL SUCCINATE 50 MG/1
TABLET, EXTENDED RELEASE ORAL
Qty: 90 TABLET | Refills: 3 | Status: SHIPPED | OUTPATIENT
Start: 2023-05-08 | End: 2023-05-16 | Stop reason: SDUPTHER

## 2023-05-15 RX ORDER — TIMOLOL MALEATE 5 MG/ML
SOLUTION/ DROPS OPHTHALMIC
COMMUNITY
End: 2023-10-10 | Stop reason: ALTCHOICE

## 2023-05-15 RX ORDER — IPRATROPIUM BROMIDE AND ALBUTEROL SULFATE 2.5; .5 MG/3ML; MG/3ML
SOLUTION RESPIRATORY (INHALATION)
COMMUNITY
End: 2023-10-10 | Stop reason: ALTCHOICE

## 2023-05-15 RX ORDER — AMLODIPINE BESYLATE 5 MG/1
TABLET ORAL
COMMUNITY
End: 2023-10-10 | Stop reason: ALTCHOICE

## 2023-05-15 RX ORDER — LOSARTAN POTASSIUM AND HYDROCHLOROTHIAZIDE 12.5; 1 MG/1; MG/1
TABLET ORAL
COMMUNITY
End: 2023-08-16 | Stop reason: ALTCHOICE

## 2023-05-15 RX ORDER — LANCETS
EACH MISCELLANEOUS
COMMUNITY
Start: 2023-02-13 | End: 2024-02-06

## 2023-05-15 RX ORDER — METOPROLOL TARTRATE 100 MG/1
100 TABLET ORAL 2 TIMES DAILY
COMMUNITY
End: 2023-10-10 | Stop reason: ALTCHOICE

## 2023-05-15 RX ORDER — ALPRAZOLAM 0.25 MG/1
0.25 TABLET ORAL
COMMUNITY
End: 2023-10-10 | Stop reason: ALTCHOICE

## 2023-05-15 RX ORDER — HYDROCODONE BITARTRATE AND HOMATROPINE METHYLBROMIDE ORAL SOLUTION 5; 1.5 MG/5ML; MG/5ML
LIQUID ORAL
COMMUNITY
Start: 2015-01-03 | End: 2023-10-10 | Stop reason: ALTCHOICE

## 2023-05-15 RX ORDER — NIRMATRELVIR AND RITONAVIR 300-100 MG
KIT ORAL
COMMUNITY
Start: 2023-02-07 | End: 2023-06-20 | Stop reason: ALTCHOICE

## 2023-05-15 RX ORDER — LATANOPROST 50 UG/ML
SOLUTION/ DROPS OPHTHALMIC
COMMUNITY
End: 2023-10-10 | Stop reason: ALTCHOICE

## 2023-05-15 RX ORDER — PREDNISONE 20 MG/1
TABLET ORAL
COMMUNITY
Start: 2015-01-03 | End: 2023-10-10 | Stop reason: ALTCHOICE

## 2023-05-15 RX ORDER — NAPROXEN SODIUM 220 MG/1
81 TABLET, FILM COATED ORAL DAILY
COMMUNITY
End: 2023-12-19 | Stop reason: WASHOUT

## 2023-05-15 RX ORDER — AZITHROMYCIN 250 MG/1
TABLET, FILM COATED ORAL
COMMUNITY
Start: 2023-03-08 | End: 2023-08-07

## 2023-05-15 RX ORDER — NEOMYCIN SULFATE, POLYMYXIN B SULFATE, AND DEXAMETHASONE 3.5; 10000; 1 MG/G; [USP'U]/G; MG/G
OINTMENT OPHTHALMIC
COMMUNITY
Start: 2023-01-20 | End: 2023-10-10 | Stop reason: ALTCHOICE

## 2023-05-15 RX ORDER — CELECOXIB 200 MG/1
CAPSULE ORAL
COMMUNITY
End: 2023-10-10 | Stop reason: ALTCHOICE

## 2023-05-16 ENCOUNTER — OFFICE VISIT (OUTPATIENT)
Dept: PRIMARY CARE | Facility: CLINIC | Age: 83
End: 2023-05-16
Payer: MEDICARE

## 2023-05-16 VITALS
HEIGHT: 70 IN | DIASTOLIC BLOOD PRESSURE: 71 MMHG | BODY MASS INDEX: 31.5 KG/M2 | SYSTOLIC BLOOD PRESSURE: 122 MMHG | OXYGEN SATURATION: 97 % | TEMPERATURE: 97.9 F | HEART RATE: 65 BPM | WEIGHT: 220 LBS | RESPIRATION RATE: 18 BRPM

## 2023-05-16 DIAGNOSIS — M54.17 LUMBOSACRAL RADICULITIS: ICD-10-CM

## 2023-05-16 DIAGNOSIS — M65.331 TRIGGER MIDDLE FINGER OF RIGHT HAND: ICD-10-CM

## 2023-05-16 DIAGNOSIS — I10 PRIMARY HYPERTENSION: ICD-10-CM

## 2023-05-16 DIAGNOSIS — G62.9 NEUROPATHY: ICD-10-CM

## 2023-05-16 DIAGNOSIS — E11.9 TYPE 2 DIABETES MELLITUS WITHOUT COMPLICATION, WITHOUT LONG-TERM CURRENT USE OF INSULIN (MULTI): Primary | ICD-10-CM

## 2023-05-16 DIAGNOSIS — R26.9 ABNORMAL GAIT: ICD-10-CM

## 2023-05-16 DIAGNOSIS — N13.8 BENIGN PROSTATIC HYPERPLASIA WITH URINARY OBSTRUCTION: ICD-10-CM

## 2023-05-16 DIAGNOSIS — F41.1 GENERALIZED ANXIETY DISORDER: ICD-10-CM

## 2023-05-16 DIAGNOSIS — Z98.890 STATUS POST LUMBAR SPINE OPERATIVE PROCEDURE FOR DECOMPRESSION OF SPINAL CORD: ICD-10-CM

## 2023-05-16 DIAGNOSIS — Z95.5 HISTORY OF CORONARY ARTERY STENT PLACEMENT: ICD-10-CM

## 2023-05-16 DIAGNOSIS — I50.32 CHRONIC DIASTOLIC CONGESTIVE HEART FAILURE (MULTI): ICD-10-CM

## 2023-05-16 DIAGNOSIS — E11.9 DIABETES MELLITUS WITHOUT COMPLICATION (MULTI): ICD-10-CM

## 2023-05-16 DIAGNOSIS — I25.10 CORONARY ARTERY DISEASE INVOLVING NATIVE CORONARY ARTERY OF NATIVE HEART WITHOUT ANGINA PECTORIS: ICD-10-CM

## 2023-05-16 DIAGNOSIS — E11.49 OTHER DIABETIC NEUROLOGICAL COMPLICATION ASSOCIATED WITH TYPE 2 DIABETES MELLITUS (MULTI): ICD-10-CM

## 2023-05-16 DIAGNOSIS — N40.1 BENIGN PROSTATIC HYPERPLASIA WITH URINARY OBSTRUCTION: ICD-10-CM

## 2023-05-16 LAB
HBA1C MFR BLD: 7.5 % (ref 4.2–6.5)
POC FINGERSTICK BLOOD GLUCOSE: 179 MG/DL (ref 70–100)
POC INR: 2.3 (ref 0.9–1.1)

## 2023-05-16 PROCEDURE — 3078F DIAST BP <80 MM HG: CPT | Performed by: FAMILY MEDICINE

## 2023-05-16 PROCEDURE — 3074F SYST BP LT 130 MM HG: CPT | Performed by: FAMILY MEDICINE

## 2023-05-16 PROCEDURE — 1159F MED LIST DOCD IN RCRD: CPT | Performed by: FAMILY MEDICINE

## 2023-05-16 PROCEDURE — 83036 HEMOGLOBIN GLYCOSYLATED A1C: CPT | Performed by: FAMILY MEDICINE

## 2023-05-16 PROCEDURE — 85610 PROTHROMBIN TIME: CPT | Performed by: FAMILY MEDICINE

## 2023-05-16 PROCEDURE — 99214 OFFICE O/P EST MOD 30 MIN: CPT | Performed by: FAMILY MEDICINE

## 2023-05-16 PROCEDURE — 1036F TOBACCO NON-USER: CPT | Performed by: FAMILY MEDICINE

## 2023-05-16 PROCEDURE — 82962 GLUCOSE BLOOD TEST: CPT | Performed by: FAMILY MEDICINE

## 2023-05-16 RX ORDER — GABAPENTIN 600 MG/1
600 TABLET ORAL 3 TIMES DAILY
Qty: 270 TABLET | Refills: 3 | Status: SHIPPED | OUTPATIENT
Start: 2023-05-16 | End: 2024-05-15

## 2023-05-16 RX ORDER — LIDOCAINE HYDROCHLORIDE 10 MG/ML
0.5 INJECTION INFILTRATION; PERINEURAL
Status: COMPLETED | OUTPATIENT
Start: 2023-05-16 | End: 2023-05-16

## 2023-05-16 RX ORDER — METOPROLOL SUCCINATE 100 MG/1
100 TABLET, EXTENDED RELEASE ORAL NIGHTLY
Qty: 90 TABLET | Refills: 3 | Status: SHIPPED | OUTPATIENT
Start: 2023-05-16 | End: 2024-05-30 | Stop reason: SDUPTHER

## 2023-05-16 RX ORDER — METOPROLOL SUCCINATE 50 MG/1
50 TABLET, EXTENDED RELEASE ORAL
Qty: 90 TABLET | Refills: 3 | Status: SHIPPED | OUTPATIENT
Start: 2023-05-16 | End: 2024-05-30 | Stop reason: SDUPTHER

## 2023-05-16 RX ORDER — TAMSULOSIN HYDROCHLORIDE 0.4 MG/1
0.4 CAPSULE ORAL NIGHTLY
Qty: 90 CAPSULE | Refills: 3 | Status: SHIPPED | OUTPATIENT
Start: 2023-05-16 | End: 2024-05-30 | Stop reason: SDUPTHER

## 2023-05-16 RX ADMIN — LIDOCAINE HYDROCHLORIDE 0.5 ML: 10 INJECTION INFILTRATION; PERINEURAL at 13:01

## 2023-05-16 ASSESSMENT — PAIN SCALES - GENERAL: PAINLEVEL: 2

## 2023-05-16 NOTE — PROGRESS NOTES
Patient ID: Trell Aguila is a 82 y.o. male.  Trigger finger right hand , third digit.    Injection tendon or ligament: R long A1 for trigger finger on 5/16/2023 1:01 PM  Indications: tendon swelling  Details: 27 G needle, volar approach  Medications: 2.5 mg triamcinolone acetonide 10 mg/mL; 0.5 mL lidocaine 10 mg/mL (1 %)    Right hand third digit trigger finger injected under sterile technique.    2.5 mg Kenalog +0.5 mL lidocaine into the right third digit trigger finger.  No complications or problems and sterile dressing applied.    Subjective   Trell Aguila is a 82 y.o. male who presents for Follow-up.    HPI  : Patient is a 82-year-old diabetic male who is in for recheck on his blood sugar, A1c and Coumadin INR.  He has atrial fibrillation, diabetes and hypertension.  He also follows with cardiology for coronary heart disease and stents.  He has a right hand trigger finger that he was hoping to get injected today.  He also have his sugar and A1c checked along with his INR.  Patient had surgery on two trigger fingers of the left hand about a month ago by hand surgeon and he was instructed to have cortisone into the right hand trigger finger, which I will do today.  Patient also had lumbar surgery over a year ago and has improved but still needs to ambulate with a cane due to his right foot drop.    Objective   :  ROS  ;10 systems were reviewed and the information is included in the HPI and no additional review of systems is indicated.    Physical Exam  Vitals and nursing note reviewed.   Constitutional:       Appearance: Normal appearance. He is obese.      Comments: Patient is alert and oriented x3.   HENT:      Head: Normocephalic.      Right Ear: Tympanic membrane normal.      Left Ear: Tympanic membrane normal.      Nose: Nose normal.      Mouth/Throat:      Mouth: Mucous membranes are moist.      Pharynx: Oropharynx is clear.      Comments: Mouth is moist, tongue is midline, no posterior pharyngeal  erythema.  Eyes:      Extraocular Movements: Extraocular movements intact.      Conjunctiva/sclera: Conjunctivae normal.      Pupils: Pupils are equal, round, and reactive to light.      Comments: Patient does have an eye exam yearly for his diabetes, he denies any blurred or double vision.   Neck:      Comments: Mild restriction of motion cervical spine due to spasm and degenerative arthritis.  He occasionally gets neck spasm and some radiation of pain into the shoulders.  Currently he is stable.  Cardiovascular:      Rate and Rhythm: Rhythm irregular.      Comments: Heart rhythm is irregular.  Patient does have atrial fibrillation.  He denies any chest pain or palpitations.  He does follow closely with cardiology.  Pulmonary:      Effort: Pulmonary effort is normal.      Breath sounds: Normal breath sounds.      Comments: Patient denies any coughing or wheezing but does have a history of asthma.  Lungs are clear to auscultation.  Abdominal:      Comments: Abdomen is soft and obese and nontender to palpation.     No guarding and no rebound tenderness.     No flank tenderness.   Genitourinary:     Comments: Did not examine rectal exam..      Patient denies any dysuria, no hematuria, no flank tenderness.  Musculoskeletal:         General: Tenderness present.      Cervical back: Tenderness present.      Comments: Patient had lumbar spinal surgery about a year and a half ago.  He has recovered and ambulates with a walker or cane.  He denies any radicular pain but does have a right foot drop.     Also has arthritis in his knees.  He is not medically stable for knee replacement.   Skin:     General: Skin is warm.      Findings: Bruising present.      Comments: Patient does get bruising from his Coumadin.   Neurological:      General: No focal deficit present.      Mental Status: He is alert and oriented to person, place, and time.      Comments: Patient does have a right foot drop from chronic lumbosacral disc disease.   Does have some diabetic peripheral neuropathy lower extremities.  No history of CVA.  Diminished reflexes upper and lower extremities.  No evidence of muscle weakness.  Ambulates with a walker or cane.  Did have lumbar spine surgery for spinal stenosis about a year and a half ago.   Psychiatric:         Behavior: Behavior normal.         Thought Content: Thought content normal.      Comments: Patient is alert and oriented but does have a lot of anxiety and always worries about his health.  He is with his wife and he does most of the talking.  Denies any depression.  He does suffer with some hypochondriasis especially concerning health issues.     PLAN  : Patient is a 82-year-old male who was evaluated today for his blood sugar, A1c and INR.  His blood sugar today was 179 and his A1c was 7.5%.  Patient's Coumadin INR was 2.3%.  He is trying to watch his sugar better but still she eats quite a bit and he needs to cut out his evening snacks.  Patient also had problems with his right hand trigger finger of the third digit.  I did order a shot of Kenalog and lidocaine into the tendon of the trigger finger.  Hopefully this will relieve some of the problem and pain with it.  Patient also needed a refill on 4 medications and those were sent to his pharmacy.  Patient is otherwise stable and will follow-up in 2 to 3 months, or sooner as needed.    Problem List Items Addressed This Visit          Nervous    Diabetic neuropathy (CMS/HCC)    Relevant Orders    POCT fingerstick glucose manually resulted (Completed)    POCT INR manually resulted (Completed)    POCT Glycosylated Hemoglobin (HGB A1C) docked device    Lumbosacral radiculitis    Status post lumbar spine operative procedure for decompression of spinal cord - Primary       Circulatory    Chronic diastolic congestive heart failure (CMS/HCC)    Relevant Medications    amLODIPine (Norvasc) 5 mg tablet    metoprolol tartrate (Lopressor) 100 mg tablet    Other Relevant  Orders    POCT fingerstick glucose manually resulted (Completed)    POCT INR manually resulted (Completed)    Coronary artery disease    Relevant Medications    amLODIPine (Norvasc) 5 mg tablet    metoprolol tartrate (Lopressor) 100 mg tablet    History of coronary artery stent placement    Relevant Orders    POCT fingerstick glucose manually resulted (Completed)    POCT INR manually resulted (Completed)    Hypertension       Musculoskeletal    Trigger middle finger of right hand       Endocrine/Metabolic    Diabetes mellitus type 2, uncomplicated (CMS/[facility])       Other    Abnormal gait    Anxiety disorder     Other Visit Diagnoses       Diabetes mellitus without complication (CMS/[facility])        Relevant Orders    POCT Glycosylated Hemoglobin (HGB A1C) docked device                 Luis Newman DO

## 2023-06-20 ENCOUNTER — OFFICE VISIT (OUTPATIENT)
Dept: PRIMARY CARE | Facility: CLINIC | Age: 83
End: 2023-06-20
Payer: MEDICARE

## 2023-06-20 VITALS
OXYGEN SATURATION: 95 % | HEIGHT: 69 IN | SYSTOLIC BLOOD PRESSURE: 110 MMHG | RESPIRATION RATE: 18 BRPM | WEIGHT: 218 LBS | HEART RATE: 70 BPM | DIASTOLIC BLOOD PRESSURE: 64 MMHG | BODY MASS INDEX: 32.29 KG/M2 | TEMPERATURE: 98.1 F

## 2023-06-20 DIAGNOSIS — N40.1 BENIGN PROSTATIC HYPERPLASIA WITH NOCTURIA: Primary | ICD-10-CM

## 2023-06-20 DIAGNOSIS — G89.29 CHRONIC RIGHT SHOULDER PAIN: ICD-10-CM

## 2023-06-20 DIAGNOSIS — E11.9 DIABETES MELLITUS WITHOUT COMPLICATION (MULTI): ICD-10-CM

## 2023-06-20 DIAGNOSIS — Z98.890 STATUS POST LUMBAR SPINE OPERATIVE PROCEDURE FOR DECOMPRESSION OF SPINAL CORD: ICD-10-CM

## 2023-06-20 DIAGNOSIS — F41.3 OTHER MIXED ANXIETY DISORDERS: ICD-10-CM

## 2023-06-20 DIAGNOSIS — S16.1XXA STRAIN OF NECK MUSCLE, INITIAL ENCOUNTER: ICD-10-CM

## 2023-06-20 DIAGNOSIS — M54.17 LUMBOSACRAL RADICULITIS: ICD-10-CM

## 2023-06-20 DIAGNOSIS — E08.43 DIABETIC AUTONOMIC NEUROPATHY ASSOCIATED WITH DIABETES MELLITUS DUE TO UNDERLYING CONDITION (MULTI): ICD-10-CM

## 2023-06-20 DIAGNOSIS — I10 PRIMARY HYPERTENSION: ICD-10-CM

## 2023-06-20 DIAGNOSIS — I48.0 PAROXYSMAL ATRIAL FIBRILLATION (MULTI): ICD-10-CM

## 2023-06-20 DIAGNOSIS — R35.1 BENIGN PROSTATIC HYPERPLASIA WITH NOCTURIA: Primary | ICD-10-CM

## 2023-06-20 DIAGNOSIS — M54.12 BRACHIAL (CERVICAL) NEURITIS: ICD-10-CM

## 2023-06-20 DIAGNOSIS — G62.9 NEUROPATHY: ICD-10-CM

## 2023-06-20 DIAGNOSIS — M25.511 CHRONIC RIGHT SHOULDER PAIN: ICD-10-CM

## 2023-06-20 LAB
ESTIMATED AVERAGE GLUCOSE FOR HBA1C: 192 MG/DL
HEMOGLOBIN A1C/HEMOGLOBIN TOTAL IN BLOOD: 8.3 %
POC FINGERSTICK BLOOD GLUCOSE: 209 MG/DL (ref 70–100)
POC INR: 2.4 (ref 0.9–1.1)

## 2023-06-20 PROCEDURE — 82962 GLUCOSE BLOOD TEST: CPT | Performed by: FAMILY MEDICINE

## 2023-06-20 PROCEDURE — 1160F RVW MEDS BY RX/DR IN RCRD: CPT | Performed by: FAMILY MEDICINE

## 2023-06-20 PROCEDURE — 83036 HEMOGLOBIN GLYCOSYLATED A1C: CPT

## 2023-06-20 PROCEDURE — 96372 THER/PROPH/DIAG INJ SC/IM: CPT | Performed by: FAMILY MEDICINE

## 2023-06-20 PROCEDURE — 3074F SYST BP LT 130 MM HG: CPT | Performed by: FAMILY MEDICINE

## 2023-06-20 PROCEDURE — 1036F TOBACCO NON-USER: CPT | Performed by: FAMILY MEDICINE

## 2023-06-20 PROCEDURE — 85610 PROTHROMBIN TIME: CPT | Performed by: FAMILY MEDICINE

## 2023-06-20 PROCEDURE — 1159F MED LIST DOCD IN RCRD: CPT | Performed by: FAMILY MEDICINE

## 2023-06-20 PROCEDURE — 3078F DIAST BP <80 MM HG: CPT | Performed by: FAMILY MEDICINE

## 2023-06-20 PROCEDURE — 99214 OFFICE O/P EST MOD 30 MIN: CPT | Performed by: FAMILY MEDICINE

## 2023-06-20 ASSESSMENT — PAIN SCALES - GENERAL: PAINLEVEL: 8

## 2023-06-20 NOTE — RESULT ENCOUNTER NOTE
Patient's A1c was up slightly at 8.3%.  He needs to watch his diet closely and get it under 7.       Hopefully his right neck and shoulder are feeling better after the cortisone shot.

## 2023-06-20 NOTE — PROGRESS NOTES
Subjective   Trell Aguila is a 82 y.o. male who presents for Follow-up (Patient complains of right shoulder and neck pain since fall at home 2 months ago).    HPI  : Patient is a 82-year-old diabetic male who always has many complaints and problems.  He is in today to check his right neck and shoulder since he had fallen about a month or 2 ago at home and strained his neck and shoulder.  He states it is not improving and he would like a cortisone shot to improve the healing process.  He also is diabetic and needs his blood sugar and A1c rechecked.  He also takes Coumadin for paroxysmal atrial fibrillation and we will check his INR.      Objective :ROS  10 systems were reviewed and the information is included in the HPI and no additional review of systems is indicated.    Physical Exam  Vitals and nursing note reviewed.   Constitutional:       Appearance: Normal appearance. He is obese.      Comments: Patient is mildly obese and is alert and oriented.  No acute distress.  Is complaining about right neck and shoulder pain.   HENT:      Head: Normocephalic.      Right Ear: Tympanic membrane and ear canal normal.      Left Ear: Tympanic membrane and ear canal normal.      Nose: Nose normal.      Mouth/Throat:      Mouth: Mucous membranes are moist.      Pharynx: Oropharynx is clear.      Comments: Mouth is moist, tongue is midline, no posterior pharyngeal erythema.  Eyes:      Extraocular Movements: Extraocular movements intact.      Conjunctiva/sclera: Conjunctivae normal.      Pupils: Pupils are equal, round, and reactive to light.      Comments: Patient denies blurred or double vision.  He does have a yearly eye exam.   Neck:      Comments: Complaining of right-sided neck pain with radiation to the right shoulder.  He states he had fallen about a month or 2 ago and strained his neck and shoulder.  There is restriction of motion and increased spasm in the right cervical region C5-C6-C7.  Cardiovascular:      Rate and  Rhythm: Normal rate. Rhythm irregular.      Heart sounds: Normal heart sounds.      Comments: Patient has a history of paroxysmal atrial fibrillation and his heart rhythm is irregular.  Normal rate though.  He is on Coumadin and we do check his INR.  He also goes to the Coumadin clinic every month.  Patient denies chest pain and no palpitations.  Pulmonary:      Effort: Pulmonary effort is normal.      Breath sounds: Normal breath sounds.      Comments: Normal inspiration and normal expiration.       Lungs are clear to auscultation.  Patient has a history of asthma but no wheezing is noted.  Abdominal:      General: Bowel sounds are normal.      Palpations: Abdomen is soft.      Comments: Abdomen is soft and somewhat obese.  Patient has chronic gas problems and is concerned about having too much gas and bloating.  I do think it has a lot to do with what he is eating.  He will try some Mylicon or Gas-X.   Genitourinary:     Comments: Patient denies any dysuria, no hematuria.  He denies flank pain, does have nocturia.  Musculoskeletal:         General: Tenderness present.      Cervical back: Rigidity and tenderness present.      Comments: Patient has restricted range of motion in the right cervical spine and also the right shoulder.  He had fall about a month ago and states he strained his neck and shoulder.  He was given some range of motion exercises to try and his wife will apply some Aspercreme with lidocaine.  He also received 4 mg dexamethasone +2 mL lidocaine 2% into the right deltoid muscle.   Skin:     General: Skin is dry.      Comments: Skin is dry, no signs of bruising or skin lesions.  No rashes noted.   Neurological:      General: No focal deficit present.      Mental Status: He is alert and oriented to person, place, and time. Mental status is at baseline.      Comments: Patient had lumbar spinal surgery about 2 years ago and continues to improve.  He has degenerative arthritis in both his knees and he  has a right foot drop.  He ambulates with a cane and his gait and coordination are not good.  He can lose his balance and he did fall about a month ago and strained his right shoulder and his right neck.  He needs to use his cane at all times and he has to be cautious about his balance ever since he had a spinal surgery and also the right foot drop.  Patient also has peripheral neuropathy in the legs and feet from his diabetes.   Psychiatric:         Mood and Affect: Mood normal.         Behavior: Behavior normal.         Thought Content: Thought content normal.         Judgment: Judgment normal.      Comments: Patient has a lot of anxiety concerning health issues.  Thought content and judgment are stable.  He does rely quite a bit on his wife for answers to questions.  Behavior is stable and he denies any depression.     PLAN  : Patient is an 82-year-old diabetic male who was evaluated today for several problems and concerns.  His blood sugar was slightly up at 209.  His A1c was sent to the lab and he will be notified of that result tomorrow.  His Coumadin INR was stable at 2.4%.  Due to his recent fall he wanted a cortisone shot in the right shoulder muscle to help its healing and he did receive 4 mg dexamethasone +2 mL lidocaine 2% into the right deltoid muscle.  He was also given some stretching exercises for his neck and right shoulder region.  If he does not improve we will follow-up for further care and treatment and I did ask if he wanted physical therapy but he said not at this time.  He needs to be cautious and use his cane at all times so he does not lose his balance due to the right foot drop.    Problem List Items Addressed This Visit       Anxiety disorder    BPH (benign prostatic hyperplasia) - Primary    Diabetes mellitus without complication (CMS/HCC)    Diabetic neuropathy (CMS/HCC)    Hypertension    Lumbosacral radiculitis    Paroxysmal atrial fibrillation (CMS/HCC)    Right shoulder pain     Status post lumbar spine operative procedure for decompression of spinal cord    Strain of neck muscle    Neuropathy     Other Visit Diagnoses       Brachial (cervical) neuritis                     Luis Newman DO

## 2023-07-05 DIAGNOSIS — I48.0 PAROXYSMAL ATRIAL FIBRILLATION (MULTI): Primary | ICD-10-CM

## 2023-07-05 RX ORDER — WARFARIN SODIUM 5 MG/1
TABLET ORAL
Qty: 90 TABLET | Refills: 0 | Status: SHIPPED | OUTPATIENT
Start: 2023-07-05 | End: 2023-10-24 | Stop reason: SDUPTHER

## 2023-07-19 ENCOUNTER — OFFICE VISIT (OUTPATIENT)
Dept: PRIMARY CARE | Facility: CLINIC | Age: 83
End: 2023-07-19
Payer: MEDICARE

## 2023-07-19 VITALS
DIASTOLIC BLOOD PRESSURE: 68 MMHG | HEART RATE: 62 BPM | SYSTOLIC BLOOD PRESSURE: 114 MMHG | HEIGHT: 69 IN | TEMPERATURE: 97.9 F | BODY MASS INDEX: 30.96 KG/M2 | OXYGEN SATURATION: 97 % | WEIGHT: 209 LBS | RESPIRATION RATE: 18 BRPM

## 2023-07-19 DIAGNOSIS — I50.32 CHRONIC DIASTOLIC CONGESTIVE HEART FAILURE (MULTI): Primary | ICD-10-CM

## 2023-07-19 DIAGNOSIS — E78.5 DYSLIPIDEMIA: ICD-10-CM

## 2023-07-19 DIAGNOSIS — I48.0 PAROXYSMAL ATRIAL FIBRILLATION (MULTI): ICD-10-CM

## 2023-07-19 DIAGNOSIS — E11.9 DIABETES MELLITUS WITHOUT COMPLICATION (MULTI): ICD-10-CM

## 2023-07-19 DIAGNOSIS — Z12.5 SCREENING PSA (PROSTATE SPECIFIC ANTIGEN): ICD-10-CM

## 2023-07-19 DIAGNOSIS — M54.12 BRACHIAL (CERVICAL) NEURITIS: ICD-10-CM

## 2023-07-19 DIAGNOSIS — F41.1 GENERALIZED ANXIETY DISORDER: ICD-10-CM

## 2023-07-19 DIAGNOSIS — I10 PRIMARY HYPERTENSION: ICD-10-CM

## 2023-07-19 DIAGNOSIS — E55.9 VITAMIN D DEFICIENCY: ICD-10-CM

## 2023-07-19 DIAGNOSIS — R26.9 ABNORMAL GAIT: ICD-10-CM

## 2023-07-19 LAB
HBA1C MFR BLD: 7.6 % (ref 4.2–6.5)
POC FINGERSTICK BLOOD GLUCOSE: 168 MG/DL (ref 70–100)
POC INR: 2.5 (ref 0.9–1.1)

## 2023-07-19 PROCEDURE — 1036F TOBACCO NON-USER: CPT | Performed by: FAMILY MEDICINE

## 2023-07-19 PROCEDURE — 1159F MED LIST DOCD IN RCRD: CPT | Performed by: FAMILY MEDICINE

## 2023-07-19 PROCEDURE — 85610 PROTHROMBIN TIME: CPT | Performed by: FAMILY MEDICINE

## 2023-07-19 PROCEDURE — 82962 GLUCOSE BLOOD TEST: CPT | Performed by: FAMILY MEDICINE

## 2023-07-19 PROCEDURE — 1160F RVW MEDS BY RX/DR IN RCRD: CPT | Performed by: FAMILY MEDICINE

## 2023-07-19 PROCEDURE — 83036 HEMOGLOBIN GLYCOSYLATED A1C: CPT | Performed by: FAMILY MEDICINE

## 2023-07-19 PROCEDURE — 99214 OFFICE O/P EST MOD 30 MIN: CPT | Performed by: FAMILY MEDICINE

## 2023-07-19 PROCEDURE — 1126F AMNT PAIN NOTED NONE PRSNT: CPT | Performed by: FAMILY MEDICINE

## 2023-07-19 PROCEDURE — 3074F SYST BP LT 130 MM HG: CPT | Performed by: FAMILY MEDICINE

## 2023-07-19 PROCEDURE — 3078F DIAST BP <80 MM HG: CPT | Performed by: FAMILY MEDICINE

## 2023-07-19 RX ORDER — INSULIN GLARGINE 300 U/ML
15 INJECTION, SOLUTION SUBCUTANEOUS NIGHTLY
Qty: 3 ML | Refills: 1 | Status: SHIPPED | OUTPATIENT
Start: 2023-07-19 | End: 2024-07-18

## 2023-07-19 RX ORDER — TRIAMCINOLONE ACETONIDE 40 MG/ML
40 INJECTION, SUSPENSION INTRA-ARTICULAR; INTRAMUSCULAR ONCE
Status: COMPLETED | OUTPATIENT
Start: 2023-07-19 | End: 2023-07-20

## 2023-07-19 ASSESSMENT — PAIN SCALES - GENERAL: PAINLEVEL: 0-NO PAIN

## 2023-07-19 NOTE — PROGRESS NOTES
Subjective   Trell Aguila is a 82 y.o. male who presents for Follow-up. Neck pain and spasm. Diabetes recheck    HPI  : Patient is an 82-year-old diabetic male who has hypochondriasis and wants his blood sugar and A1c rechecked before he goes on vacation next week.  He also wants his INR rechecked since he worries about everything being stable before he leaves on vacation.  He also has neck pain and spasm which radiates into the shoulders and he was also wanting a trigger point cortisone shot for his neck stiffness since he is driving all the way to [place] next week for vacation.  Patient also follows closely with cardiology, orthopedics after having lumbar spine surgery 2 years ago and he also had recent trigger finger surgery on his left hand.  Patient currently ambulates with a cane most of the time due to some instability and a right foot drop.  The spinal surgery 2 years ago was successful and he states he has no back pain but just an unsteady gait.  He also has arthritis in his shoulders hips and knees.      Objective  :  ROS  :10 systems were reviewed and the information is included in the HPI and no additional review of systems is indicated.    Physical Exam  Vitals and nursing note reviewed.   Constitutional:       Appearance: Normal appearance.      Comments: Patient is alert and oriented x3 in no acute distress.   HENT:      Head: Normocephalic and atraumatic.      Right Ear: Tympanic membrane and ear canal normal.      Left Ear: Tympanic membrane and ear canal normal.      Nose: Nose normal.      Comments: Nasal ,Allergic rhinitis.     Mouth/Throat:      Mouth: Mucous membranes are moist.      Pharynx: Oropharynx is clear.      Comments: Mouth is moist, tongue is midline, no  posterior pharyngeal erythema  Eyes:      Extraocular Movements: Extraocular movements intact.      Conjunctiva/sclera: Conjunctivae normal.      Pupils: Pupils are equal, round, and reactive to light.      Comments: Patient  denies any visual disturbance.  Does have a yearly eye exam.     Neck:      Comments: Restricted range of motion cervical spine with a right C5-C6-C7 trigger point.  Patient would like a cortisone shot on the right side to relieve some of the pain and spasm in that area before he drives to [place] next week.  Denies any cervical adenopathy and no thyromegaly.  No carotid bruits noted.  Cardiovascular:      Rate and Rhythm: Normal rate and regular rhythm.      Pulses: Normal pulses.      Heart sounds: Normal heart sounds. No murmur heard.     Comments: Patient denies any chest pain and no palpitations.  He recently saw cardiology and they feel he is stable.   Heart rhythm is stable S1 and S2 are noted.  No murmurs or ectopics.   Pulmonary:      Effort: Pulmonary effort is normal.      Breath sounds: Normal breath sounds.      Comments: Patient has a history of asthma and asthmatic bronchitis which have been stable recently.  He states he has to be cautious going outdoors with the smog alert and all the pollution from the fires up in [place].  He does have a home nebulizer machine.  Lungs have few scattered rhonchi to auscultation.      No coughing or wheezing noted.  Abdominal:      General: Bowel sounds are normal.      Palpations: Abdomen is soft.      Comments: Patient denies any abdominal pain, no rebound tenderness and no guarding.  Denies any flank tenderness.    Abdomen is soft and mildly obese, with positive bowel sounds x4.   Genitourinary:     Comments: Patient denies any flank tenderness, no dysuria and no hematuria.   Occasional nocturia.    Musculoskeletal:         General: No swelling or tenderness. Normal range of motion.      Cervical back: Normal range of motion and neck supple.      Comments: Patient had lumbar spinal surgery 2 years ago from L1-L5 and currently is stable.  He does ambulate with a cane and has to be cautious due to her right foot drop.  He also has arthritis in both his shoulders,  both his hips and both his knees.  With all his cardiac problems he is not really a surgical candidate.  He does have cervical degenerative arthritis and a trigger point at C7 on the right.  He will be given 40 mg Kenalog and 2 mL lidocaine 1% for the trigger point injection.   Skin:     General: Skin is warm and dry.      Comments: Patient denies any skin lesions does occasionally get tenia type yeast rashes in the groin and axillary area from his diabetes.  No current bruising or erythema noted.   Neurological:      General: No focal deficit present.      Mental Status: He is alert and oriented to person, place, and time. Mental status is at baseline.      Comments:   Patient has a right foot drop which is secondary to his lumbosacral disease.  He does ambulate with a cane and has to be cautious due to some poor coordination.  His upper body muscle strength is normal.  Does have some peripheral neuropathy from his diabetes.  Otherwise he is stable with his gait, but must use his cane or a walker.   Psychiatric:         Mood and Affect: Mood normal.         Behavior: Behavior normal.         Thought Content: Thought content normal.         Judgment: Judgment normal.      Comments: Patient has anxiety with her  and his health problems, denies any  depression.       Normal thought content and judgment.  Behavior is normal.     PLAN : Patient is a 82-year-old diabetic male with multiple arthritic aches and pains.  He had a lumbar spine surgery 2 years ago and has recovered fairly well except for his balance and a right foot drop.  Patient also has cervical degenerative disc disease with a right C7 trigger point which was injected today with 40 mg Kenalog +2 mL 1% lidocaine.  He also had his blood sugar and A1c done.  The blood sugar was 168 and his A1c was 7.6%.  He was prescribed some Toujeo insulin to use at bedtime to improve his A1c.  His INR today was stable at 2.5%.  Patient otherwise is stable and he and  his wife are going on vacation and driving to [place] next week and he wanted to make sure everything was in check before he left on his trip.  Patient otherwise will follow-up in 2 to 3 months or sooner as needed.    Problem List Items Addressed This Visit       Anxiety disorder    Relevant Orders    POCT fingerstick glucose manually resulted (Completed)    POCT Glycosylated Hemoglobin (HGB A1C) docked device    Chronic diastolic congestive heart failure (CMS/HCC) - Primary    Diabetes mellitus without complication (CMS/HCC)    Relevant Medications    insulin glargine (Toujeo Max U-300 SoloStar) 300 unit/mL (3 mL) injection    Other Relevant Orders    POCT fingerstick glucose manually resulted (Completed)    POCT Glycosylated Hemoglobin (HGB A1C) docked device    Dyslipidemia    Relevant Orders    POCT fingerstick glucose manually resulted (Completed)    POCT Glycosylated Hemoglobin (HGB A1C) docked device    Hypertension    Relevant Orders    POCT fingerstick glucose manually resulted (Completed)    POCT Glycosylated Hemoglobin (HGB A1C) docked device    Paroxysmal atrial fibrillation (CMS/HCC)    Relevant Orders    POCT INR manually resulted (Completed)    Vitamin D deficiency    Brachial (cervical) neuritis    Screening PSA (prostate specific antigen)            Luis Newman, DO

## 2023-07-20 PROCEDURE — 96372 THER/PROPH/DIAG INJ SC/IM: CPT | Performed by: FAMILY MEDICINE

## 2023-07-20 RX ADMIN — TRIAMCINOLONE ACETONIDE 40 MG: 40 INJECTION, SUSPENSION INTRA-ARTICULAR; INTRAMUSCULAR at 14:10

## 2023-08-05 DIAGNOSIS — K04.7 DENTAL INFECTION: Primary | ICD-10-CM

## 2023-08-07 RX ORDER — AZITHROMYCIN 250 MG/1
TABLET, FILM COATED ORAL
Qty: 6 TABLET | Refills: 1 | Status: SHIPPED | OUTPATIENT
Start: 2023-08-07 | End: 2023-08-12

## 2023-08-16 ENCOUNTER — OFFICE VISIT (OUTPATIENT)
Dept: PRIMARY CARE | Facility: CLINIC | Age: 83
End: 2023-08-16
Payer: MEDICARE

## 2023-08-16 VITALS
SYSTOLIC BLOOD PRESSURE: 104 MMHG | HEIGHT: 69 IN | OXYGEN SATURATION: 97 % | BODY MASS INDEX: 31.1 KG/M2 | RESPIRATION RATE: 18 BRPM | WEIGHT: 210 LBS | TEMPERATURE: 97.7 F | DIASTOLIC BLOOD PRESSURE: 63 MMHG | HEART RATE: 68 BPM

## 2023-08-16 DIAGNOSIS — I10 PRIMARY HYPERTENSION: Primary | ICD-10-CM

## 2023-08-16 DIAGNOSIS — E78.5 DYSLIPIDEMIA: ICD-10-CM

## 2023-08-16 DIAGNOSIS — E11.9 DIABETES MELLITUS WITHOUT COMPLICATION (MULTI): ICD-10-CM

## 2023-08-16 DIAGNOSIS — Z79.01 ANTICOAGULATED ON COUMADIN: ICD-10-CM

## 2023-08-16 LAB
HBA1C MFR BLD: 8.3 % (ref 4.2–6.5)
POC FINGERSTICK BLOOD GLUCOSE: 184 MG/DL (ref 70–100)
POC INR: 1.4 (ref 0.9–1.1)

## 2023-08-16 PROCEDURE — 1036F TOBACCO NON-USER: CPT | Performed by: FAMILY MEDICINE

## 2023-08-16 PROCEDURE — 82962 GLUCOSE BLOOD TEST: CPT | Performed by: FAMILY MEDICINE

## 2023-08-16 PROCEDURE — 99214 OFFICE O/P EST MOD 30 MIN: CPT | Performed by: FAMILY MEDICINE

## 2023-08-16 PROCEDURE — 1160F RVW MEDS BY RX/DR IN RCRD: CPT | Performed by: FAMILY MEDICINE

## 2023-08-16 PROCEDURE — 1159F MED LIST DOCD IN RCRD: CPT | Performed by: FAMILY MEDICINE

## 2023-08-16 PROCEDURE — 85610 PROTHROMBIN TIME: CPT | Performed by: FAMILY MEDICINE

## 2023-08-16 PROCEDURE — 3074F SYST BP LT 130 MM HG: CPT | Performed by: FAMILY MEDICINE

## 2023-08-16 PROCEDURE — 1126F AMNT PAIN NOTED NONE PRSNT: CPT | Performed by: FAMILY MEDICINE

## 2023-08-16 PROCEDURE — 3078F DIAST BP <80 MM HG: CPT | Performed by: FAMILY MEDICINE

## 2023-08-16 PROCEDURE — 83036 HEMOGLOBIN GLYCOSYLATED A1C: CPT | Mod: CLIA WAIVED TEST | Performed by: FAMILY MEDICINE

## 2023-08-16 RX ORDER — LOSARTAN POTASSIUM 100 MG/1
100 TABLET ORAL DAILY
Qty: 90 TABLET | Refills: 3 | Status: SHIPPED | OUTPATIENT
Start: 2023-08-16

## 2023-08-16 RX ORDER — ROSUVASTATIN CALCIUM 10 MG/1
10 TABLET, COATED ORAL DAILY
Qty: 90 TABLET | Refills: 3 | Status: SHIPPED | OUTPATIENT
Start: 2023-08-16 | End: 2024-08-15

## 2023-08-16 ASSESSMENT — PAIN SCALES - GENERAL: PAINLEVEL: 0-NO PAIN

## 2023-08-16 NOTE — PROGRESS NOTES
Subjective   Trell Aguila is a 82 y.o. male who presents for Follow-up (Patient is diabetic, Glucose, A1C , and Coumadin check today).     HPI      Objective  : ROS :10 systems were reviewed and the information is included in the HPI and no additional review of systems is indicated.    Physical Exam  Vitals and nursing note reviewed.   Constitutional:       Appearance: Normal appearance. He is obese.      Comments: Patient is alert and oriented x3.   No acute distress   HENT:      Head: Normocephalic.      Right Ear: Tympanic membrane and external ear normal.      Left Ear: Tympanic membrane and external ear normal.      Ears:      Comments: Ears are patent bilaterally and TMs are clear.     Nose: Nose normal.      Mouth/Throat:      Mouth: Mucous membranes are moist.      Pharynx: Oropharynx is clear.      Comments: Mouth is moist, tongue is midline.  No posterior pharyngeal erythema.  Eyes:      Extraocular Movements: Extraocular movements intact.      Conjunctiva/sclera: Conjunctivae normal.      Pupils: Pupils are equal, round, and reactive to light.      Comments: No visual disturbance, does have her eyes examined once a year.   Neck:      Comments:    Restricted range of motion cervical spine due to arthritis and spasm.    Cardiovascular:      Rate and Rhythm: Normal rate. Rhythm irregular.      Pulses: Normal pulses.      Heart sounds: Normal heart sounds.      Comments:  Atrial fibrillation , controlled.  Taking Coumadin.  Heart rhythm stable S1 S2        Pulmonary:      Effort: Pulmonary effort is normal.      Breath sounds: Normal breath sounds.      Comments: Patient denies any coughing or wheezing.  Lungs are clear to auscultation.    Abdominal:      General: Bowel sounds are normal.      Palpations: Abdomen is soft.      Comments: Abdomen is soft and obese, non tender. No flank tenderness.  No suprapubic pain.  Positive bowel sounds x4.  No abdominal guarding and no rebound tenderness.    Genitourinary:     Comments: Patient denies dysuria, no hematuria, no nocturia, denies flank pain.  Musculoskeletal:         General: Normal range of motion.      Cervical back: Normal range of motion.      Comments:      Spinal arthritis and history of Lumbar surgery 2 years ago.  DJD of knees and Hips.  DJD right knee.  Left foot drop.   Skin:     General: Skin is warm.      Comments: There is no bruising, no erythema, no skin lesions noted, no rashes.   Neurological:      Mental Status: He is alert and oriented to person, place, and time. Mental status is at baseline.      Sensory: Sensory deficit present.      Coordination: Coordination abnormal.      Gait: Gait abnormal.      Comments:  Left  foot drop and history of spinal surgery . Diabetic neuropathy and parasthesias.  Ambulates with an unsteady gait due to his left foot drop and arthritis in his right knee.  Uses a cane.  Normal upper extremity muscle strength.  Some lower extremity weakness.   Psychiatric:         Behavior: Behavior normal.         Thought Content: Thought content normal.         Judgment: Judgment normal.      Comments: Chronic anxiety and worry.  Patient has normal thought and judgment but does depend on his wife for answering many questions.  Some increase in forgetfulness with age.  OCD behavior.  Always concerned about his health problems.     PLAN : Patient is an 82-year-old diabetic male who is in today for recheck on sugar, A1c and Coumadin INR.  They just returned from vacation to [place].  His blood sugar was 184 and the A1c was still somewhat elevated at 8.3%.  He is trying to get a discounted prescription for Jardiance since it is quite expensive.  He states a $140 for 3-month supply.  Patient's Coumadin INR was 1.4% so we will take an extra half of Coumadin today.  Blood pressure and other vitals are stable and he does follow closely with cardiology and also orthopedics.  His refill medications were sent into the pharmacy  and he otherwise is stable and will follow-up as needed.    Problem List Items Addressed This Visit       Diabetes mellitus without complication (CMS/HCC)    Relevant Orders    POCT fingerstick glucose manually resulted    POCT Glycosylated Hemoglobin (HGB A1C) docked device     Other Visit Diagnoses       Anticoagulated on Coumadin        Relevant Orders    POCT INR manually resulted                 Luis Newman DO

## 2023-09-26 ENCOUNTER — OFFICE VISIT (OUTPATIENT)
Dept: PRIMARY CARE | Facility: CLINIC | Age: 83
End: 2023-09-26
Payer: MEDICARE

## 2023-09-26 VITALS
BODY MASS INDEX: 31.25 KG/M2 | WEIGHT: 211 LBS | HEART RATE: 67 BPM | OXYGEN SATURATION: 95 % | TEMPERATURE: 97.9 F | HEIGHT: 69 IN | DIASTOLIC BLOOD PRESSURE: 58 MMHG | RESPIRATION RATE: 18 BRPM | SYSTOLIC BLOOD PRESSURE: 109 MMHG

## 2023-09-26 DIAGNOSIS — Z00.00 ROUTINE GENERAL MEDICAL EXAMINATION AT HEALTH CARE FACILITY: ICD-10-CM

## 2023-09-26 DIAGNOSIS — M47.819 DEGENERATIVE SPINAL ARTHRITIS: ICD-10-CM

## 2023-09-26 DIAGNOSIS — E11.42 DIABETIC POLYNEUROPATHY ASSOCIATED WITH TYPE 2 DIABETES MELLITUS (MULTI): ICD-10-CM

## 2023-09-26 DIAGNOSIS — S16.1XXS STRAIN OF NECK MUSCLE, SEQUELA: ICD-10-CM

## 2023-09-26 DIAGNOSIS — M17.11 PRIMARY OSTEOARTHRITIS OF RIGHT KNEE: ICD-10-CM

## 2023-09-26 DIAGNOSIS — Z79.01 ANTICOAGULATED ON COUMADIN: ICD-10-CM

## 2023-09-26 DIAGNOSIS — Z23 NEEDS FLU SHOT: ICD-10-CM

## 2023-09-26 DIAGNOSIS — Z00.00 MEDICARE ANNUAL WELLNESS VISIT, SUBSEQUENT: Primary | ICD-10-CM

## 2023-09-26 DIAGNOSIS — E11.9 DIABETES MELLITUS WITHOUT COMPLICATION (MULTI): ICD-10-CM

## 2023-09-26 DIAGNOSIS — M21.371 FOOT DROP, RIGHT: ICD-10-CM

## 2023-09-26 LAB
HBA1C MFR BLD: 7.5 % (ref 4.2–6.5)
POC FINGERSTICK BLOOD GLUCOSE: 178 MG/DL (ref 70–100)
POC INR: 1.7 (ref 0.9–1.1)

## 2023-09-26 PROCEDURE — G0008 ADMIN INFLUENZA VIRUS VAC: HCPCS | Performed by: FAMILY MEDICINE

## 2023-09-26 PROCEDURE — 1160F RVW MEDS BY RX/DR IN RCRD: CPT | Performed by: FAMILY MEDICINE

## 2023-09-26 PROCEDURE — 99214 OFFICE O/P EST MOD 30 MIN: CPT | Performed by: FAMILY MEDICINE

## 2023-09-26 PROCEDURE — 1125F AMNT PAIN NOTED PAIN PRSNT: CPT | Performed by: FAMILY MEDICINE

## 2023-09-26 PROCEDURE — 1159F MED LIST DOCD IN RCRD: CPT | Performed by: FAMILY MEDICINE

## 2023-09-26 PROCEDURE — 1170F FXNL STATUS ASSESSED: CPT | Performed by: FAMILY MEDICINE

## 2023-09-26 PROCEDURE — G0439 PPPS, SUBSEQ VISIT: HCPCS | Performed by: FAMILY MEDICINE

## 2023-09-26 PROCEDURE — 85610 PROTHROMBIN TIME: CPT | Performed by: FAMILY MEDICINE

## 2023-09-26 PROCEDURE — 82962 GLUCOSE BLOOD TEST: CPT | Performed by: FAMILY MEDICINE

## 2023-09-26 PROCEDURE — 1036F TOBACCO NON-USER: CPT | Performed by: FAMILY MEDICINE

## 2023-09-26 PROCEDURE — 3078F DIAST BP <80 MM HG: CPT | Performed by: FAMILY MEDICINE

## 2023-09-26 PROCEDURE — 83036 HEMOGLOBIN GLYCOSYLATED A1C: CPT | Mod: CLIA WAIVED TEST | Performed by: FAMILY MEDICINE

## 2023-09-26 PROCEDURE — 3074F SYST BP LT 130 MM HG: CPT | Performed by: FAMILY MEDICINE

## 2023-09-26 PROCEDURE — 90662 IIV NO PRSV INCREASED AG IM: CPT | Performed by: FAMILY MEDICINE

## 2023-09-26 ASSESSMENT — PAIN SCALES - GENERAL: PAINLEVEL: 8

## 2023-09-26 ASSESSMENT — ACTIVITIES OF DAILY LIVING (ADL)
DOING_HOUSEWORK: INDEPENDENT
BATHING: INDEPENDENT
DRESSING: INDEPENDENT
TAKING_MEDICATION: INDEPENDENT
MANAGING_FINANCES: INDEPENDENT
GROCERY_SHOPPING: INDEPENDENT

## 2023-09-26 ASSESSMENT — ENCOUNTER SYMPTOMS
LOSS OF SENSATION IN FEET: 1
DEPRESSION: 0
OCCASIONAL FEELINGS OF UNSTEADINESS: 1

## 2023-09-26 ASSESSMENT — PATIENT HEALTH QUESTIONNAIRE - PHQ9
1. LITTLE INTEREST OR PLEASURE IN DOING THINGS: NOT AT ALL
2. FEELING DOWN, DEPRESSED OR HOPELESS: NOT AT ALL
SUM OF ALL RESPONSES TO PHQ9 QUESTIONS 1 AND 2: 0

## 2023-09-26 NOTE — PROGRESS NOTES
Subjective   Trell Aguila is a 82 y.o. male who presents for Medicare Annual Wellness Visit Subsequent (Patient here for annual Medicare wellness visit today).    HPI : Patient is a 82-year-old male who is in for his Medicare wellness physical.  He also is diabetic and will have his blood sugar and A1c rechecked, he also is on Coumadin and needs an INR recheck.  Patient did complete the Medicare questionnaire with the medical assistant.  He does have a living will and medical power of .  His wife is also in the office and she does help take care of him  since he ambulates with a walker due to chronic back issues and arthritis in his knees.  Patient always has multiple complaints of aches and pains including pains in his neck, arthritic aches and pains in his right knee and he has had previous gel shots in the right knee.  He also follows closely with cardiology due to paroxysmal atrial fibrillation and previous coronary stents.      Objective  :ROS :10 systems were reviewed and the information is included in the HPI and no additional review of systems is indicated.    Physical Exam  Vitals and nursing note reviewed.   Constitutional:       Appearance: Normal appearance. He is obese.      Comments: Patient is alert and oriented x3.   No acute distress and trying to diet.   HENT:      Head: Normocephalic.      Right Ear: Tympanic membrane and external ear normal.      Left Ear: Tympanic membrane and external ear normal.      Ears:      Comments: Ears are patent bilaterally and TMs are clear.     Nose: Nose normal.      Mouth/Throat:      Mouth: Mucous membranes are dry.      Pharynx: Oropharynx is clear.      Comments: Mouth is dry, tongue is midline.  No posterior pharyngeal erythema.  Eyes:      Extraocular Movements: Extraocular movements intact.      Conjunctiva/sclera: Conjunctivae normal.      Pupils: Pupils are equal, round, and reactive to light.      Comments: No visual disturbance, does have her  eyes examined once a year.   Neck:      Comments: Restricted range of motion cervical spine due to DJD.    No carotid bruits, no thyromegaly, no cervical adenopathy.  Occasional neck spasm and restriction of motion secondary to stress and tension.  Cardiovascular:      Rate and Rhythm: Normal rate. Rhythm irregular.      Pulses: Normal pulses.      Heart sounds: Normal heart sounds.      Comments: Paroxysmal  atrial fibrillation.    Patient denies chest pain and no palpitations.  Heart rhythm is stable S1 and S2 are noted.  Pulmonary:      Effort: Pulmonary effort is normal.      Breath sounds: Stridor present. Rhonchi present.      Comments: History of asthmatic  bronchitis,   Patient denies any coughing or wheezing.  Lungs with few scattered rhonchi.  Abdominal:      General: Abdomen is flat. Bowel sounds are normal.      Palpations: Abdomen is soft.      Comments: Abdomen is soft and nontender, no hepatosplenomegaly.  No flank tenderness.  No suprapubic pain.  Positive bowel sounds x4.  No abdominal guarding and no rebound tenderness.   Genitourinary:     Comments: Patient denies dysuria, no hematuria, no nocturia, denies flank pain.  Musculoskeletal:         General: Swelling and tenderness present.      Comments: Patient has arthritis in both his knees but the right knee is worse than the left and he has had gel shots in the right knee.  Patient also had lumbar spine surgery at about 5 levels almost 2 years ago but is ambulating with a walker.  Osteoarthritis of the hands and fingers.  Cervical degenerative disc disease with trigger points.     Skin:     General: Skin is warm and dry.      Findings: Bruising and rash present.      Comments: Lower leg skin changes and dermatitis.  Thin skin   and bleeds easy   Neurological:      Mental Status: He is alert and oriented to person, place, and time. Mental status is at baseline.      Sensory: Sensory deficit present.      Deep Tendon Reflexes: Reflexes abnormal.       Comments:   Patient does have a right foot drop and diabetic peripheral neuropathy.  Neck and coordination and gait are stable.  Decreased muscle strength upper and lower extremities.   Psychiatric:         Behavior: Behavior normal.         Thought Content: Thought content normal.         Judgment: Judgment normal.      Comments: Patient has chronic anxiety and OCD behavior.  He does have some vascular dementia and is getting more forgetful with age.  He sometimes relies on his wife for answers to questions.  He also is very worried about all his health problems.     PLAN : Patient is an 82-year-old male who is evaluated today for a Medicare wellness physical.  His blood sugar today was 178 and his A1c was 7.5%.  He is now taking Jardiance and is trying to get a prescription filled through cardiology.  His Coumadin INR was 1.7% and he will take an extra half of Coumadin today.  He otherwise has similar complaints and has previously about his arthritic aches and pains and his difficulty ambulating and he must use a walker.  He has severe arthritis in both his knees and had lumbar spine surgery almost 2 years ago.  He still does have a right foot drop.  Patient otherwise answered the questions for the Medicare wellness exam and we did review all his medications.  He will follow-up as needed.  Patient also received his flu shot.    Problem List Items Addressed This Visit       Diabetes mellitus without complication (CMS/[facility])    Relevant Orders    POCT fingerstick glucose manually resulted    POCT Glycosylated Hemoglobin (HGB A1C) docked device     Other Visit Diagnoses       Needs flu shot    -  Primary    Relevant Orders    Flu vaccine, quadrivalent, high-dose, preservative free, age 65y+ (FLUZONE)    Anticoagulated on Coumadin        Relevant Orders    POCT INR manually resulted                 Luis Newman,

## 2023-09-27 DIAGNOSIS — J01.90 ACUTE NON-RECURRENT SINUSITIS, UNSPECIFIED LOCATION: ICD-10-CM

## 2023-09-27 RX ORDER — AZITHROMYCIN 250 MG/1
TABLET, FILM COATED ORAL
Qty: 6 TABLET | Refills: 0 | Status: SHIPPED | OUTPATIENT
Start: 2023-09-27 | End: 2023-10-02

## 2023-10-10 ENCOUNTER — ANTICOAGULATION - WARFARIN VISIT (OUTPATIENT)
Dept: PHARMACY | Facility: CLINIC | Age: 83
End: 2023-10-10
Payer: MEDICARE

## 2023-10-10 DIAGNOSIS — I48.91 ATRIAL FIBRILLATION, UNSPECIFIED TYPE (MULTI): Primary | ICD-10-CM

## 2023-10-10 LAB
POC INR: 2.2
POC PROTHROMBIN TIME: NORMAL

## 2023-10-10 PROCEDURE — 85610 PROTHROMBIN TIME: CPT

## 2023-10-10 PROCEDURE — 99212 OFFICE O/P EST SF 10 MIN: CPT

## 2023-10-10 NOTE — PROGRESS NOTES
Coumadin Clinic Visit Note    Patient verified warfarin dose  No missed doses  No unusual bruising or bleeding  No changes to medications  Consistent dietary green intake  No anticipated procedures at this time  INR Therapeutic today at 2.2  No changes to warfarin dose today  Next appointment  5 weeks.     Denisse Rucker, Pharm D

## 2023-10-18 DIAGNOSIS — M25.471 ANKLE EDEMA, BILATERAL: Primary | ICD-10-CM

## 2023-10-18 DIAGNOSIS — M25.472 ANKLE EDEMA, BILATERAL: Primary | ICD-10-CM

## 2023-10-19 RX ORDER — FUROSEMIDE 40 MG/1
40 TABLET ORAL 2 TIMES DAILY
Qty: 180 TABLET | Refills: 3 | Status: SHIPPED | OUTPATIENT
Start: 2023-10-19 | End: 2023-10-24 | Stop reason: SDUPTHER

## 2023-10-24 ENCOUNTER — OFFICE VISIT (OUTPATIENT)
Dept: PRIMARY CARE | Facility: CLINIC | Age: 83
End: 2023-10-24
Payer: MEDICARE

## 2023-10-24 VITALS
BODY MASS INDEX: 31.1 KG/M2 | OXYGEN SATURATION: 96 % | SYSTOLIC BLOOD PRESSURE: 110 MMHG | HEIGHT: 69 IN | HEART RATE: 71 BPM | DIASTOLIC BLOOD PRESSURE: 62 MMHG | WEIGHT: 210 LBS | RESPIRATION RATE: 18 BRPM | TEMPERATURE: 98.1 F

## 2023-10-24 DIAGNOSIS — Z79.01 ANTICOAGULATED ON COUMADIN: ICD-10-CM

## 2023-10-24 DIAGNOSIS — G62.9 NEUROPATHY: ICD-10-CM

## 2023-10-24 DIAGNOSIS — M25.472 ANKLE EDEMA, BILATERAL: ICD-10-CM

## 2023-10-24 DIAGNOSIS — M25.471 ANKLE EDEMA, BILATERAL: ICD-10-CM

## 2023-10-24 DIAGNOSIS — E53.8 VITAMIN B12 DEFICIENCY: ICD-10-CM

## 2023-10-24 DIAGNOSIS — I10 PRIMARY HYPERTENSION: ICD-10-CM

## 2023-10-24 DIAGNOSIS — F41.1 GENERALIZED ANXIETY DISORDER: Primary | ICD-10-CM

## 2023-10-24 DIAGNOSIS — E11.9 DIABETES MELLITUS WITHOUT COMPLICATION (MULTI): ICD-10-CM

## 2023-10-24 DIAGNOSIS — I48.0 PAROXYSMAL ATRIAL FIBRILLATION (MULTI): ICD-10-CM

## 2023-10-24 LAB
HBA1C MFR BLD: 7.6 % (ref 4.2–6.5)
POC FINGERSTICK BLOOD GLUCOSE: 181 MG/DL (ref 70–100)
POC INR: 2 (ref 0.9–1.1)

## 2023-10-24 PROCEDURE — 3074F SYST BP LT 130 MM HG: CPT | Performed by: FAMILY MEDICINE

## 2023-10-24 PROCEDURE — 99214 OFFICE O/P EST MOD 30 MIN: CPT | Performed by: FAMILY MEDICINE

## 2023-10-24 PROCEDURE — 96372 THER/PROPH/DIAG INJ SC/IM: CPT | Performed by: FAMILY MEDICINE

## 2023-10-24 PROCEDURE — 1159F MED LIST DOCD IN RCRD: CPT | Performed by: FAMILY MEDICINE

## 2023-10-24 PROCEDURE — 1126F AMNT PAIN NOTED NONE PRSNT: CPT | Performed by: FAMILY MEDICINE

## 2023-10-24 PROCEDURE — 83036 HEMOGLOBIN GLYCOSYLATED A1C: CPT | Mod: CLIA WAIVED TEST | Performed by: FAMILY MEDICINE

## 2023-10-24 PROCEDURE — 3078F DIAST BP <80 MM HG: CPT | Performed by: FAMILY MEDICINE

## 2023-10-24 PROCEDURE — 82962 GLUCOSE BLOOD TEST: CPT | Performed by: FAMILY MEDICINE

## 2023-10-24 PROCEDURE — 85610 PROTHROMBIN TIME: CPT | Performed by: FAMILY MEDICINE

## 2023-10-24 PROCEDURE — 1160F RVW MEDS BY RX/DR IN RCRD: CPT | Performed by: FAMILY MEDICINE

## 2023-10-24 PROCEDURE — 1036F TOBACCO NON-USER: CPT | Performed by: FAMILY MEDICINE

## 2023-10-24 RX ORDER — WARFARIN SODIUM 5 MG/1
5 TABLET ORAL NIGHTLY
Qty: 90 TABLET | Refills: 3 | Status: SHIPPED | OUTPATIENT
Start: 2023-10-24 | End: 2024-10-23

## 2023-10-24 RX ORDER — BLOOD SUGAR DIAGNOSTIC
200 STRIP MISCELLANEOUS 2 TIMES DAILY
Qty: 200 STRIP | Refills: 3 | Status: SHIPPED | OUTPATIENT
Start: 2023-10-24 | End: 2024-02-06 | Stop reason: SDUPTHER

## 2023-10-24 RX ORDER — CYANOCOBALAMIN 1000 UG/ML
1000 INJECTION, SOLUTION INTRAMUSCULAR; SUBCUTANEOUS ONCE
Status: COMPLETED | OUTPATIENT
Start: 2023-10-24 | End: 2023-10-24

## 2023-10-24 RX ORDER — FUROSEMIDE 40 MG/1
40 TABLET ORAL 2 TIMES DAILY
Qty: 180 TABLET | Refills: 3 | Status: SHIPPED | OUTPATIENT
Start: 2023-10-24

## 2023-10-24 RX ADMIN — CYANOCOBALAMIN 1000 MCG: 1000 INJECTION, SOLUTION INTRAMUSCULAR; SUBCUTANEOUS at 14:01

## 2023-10-24 ASSESSMENT — PATIENT HEALTH QUESTIONNAIRE - PHQ9
2. FEELING DOWN, DEPRESSED OR HOPELESS: NOT AT ALL
SUM OF ALL RESPONSES TO PHQ9 QUESTIONS 1 AND 2: 0
1. LITTLE INTEREST OR PLEASURE IN DOING THINGS: NOT AT ALL

## 2023-10-24 ASSESSMENT — PAIN SCALES - GENERAL: PAINLEVEL: 0-NO PAIN

## 2023-10-24 NOTE — PROGRESS NOTES
Subjective   Trell Aguila is a 83 y.o. male who presents for Follow-up (Follow up for Coumadin check).    HPI  : Patient is an 83-year-old male who has multiple medical problems and is being evaluated today to recheck his blood pressure, lumbosacral disc disease, blood sugar and Coumadin INR.  Patient is very worried about his health and worries about his blood sugar daily.  He also is very concerned about his INR level which we will check today.  They recently returned from vacation and he wants to make sure his sugar is not out of control.  He does try to check it at home periodically and only takes insulin when it is over 200.  Patient also has 2 warts on his finger that he would like evaluated.  Patient is accompanied with his wife.  Patient also needs a refill on 2 medications and his test strips.    Objective   : ROS 10 systems were reviewed and the information is included in the HPI and no additional review of systems is indicated.    Physical Exam  Vitals and nursing note reviewed.   Constitutional:       Appearance: Normal appearance. He is obese.      Comments: Patient is alert and oriented x3.   No acute distress and does try to watch his diet for his diabetes.   HENT:      Head: Normocephalic.      Right Ear: Tympanic membrane and external ear normal.      Left Ear: Tympanic membrane and external ear normal.      Ears:      Comments: Ears are patent bilaterally and TMs are clear.     Nose: Nose normal.      Mouth/Throat:      Mouth: Mucous membranes are moist.      Pharynx: Oropharynx is clear.      Comments: Mouth is moist, tongue is midline.  No posterior pharyngeal erythema.  Eyes:      Extraocular Movements: Extraocular movements intact.      Conjunctiva/sclera: Conjunctivae normal.      Pupils: Pupils are equal, round, and reactive to light.      Comments: No visual disturbance, does have his  eyes examined once a year.   Neck:      Comments: Patient has moderate cervical spasm and cervical  degenerative disc disease.  Mild restriction of motion.  No carotid bruits, no thyromegaly, no cervical adenopathy.  Occasional take some muscle relaxant for spasms.  Cardiovascular:      Rate and Rhythm: Normal rate. Rhythm irregular.      Pulses: Normal pulses.      Heart sounds: Normal heart sounds.      Comments: Patient has irregular atrial fibrillation, today he is in sinus rhythm.  Patient denies chest pain and no palpitations.  Heart rhythm is stable S1 and S2 are noted, occasional ectopics.  Does follow closely with cardiology and is on Coumadin.  Pulmonary:      Effort: Pulmonary effort is normal.      Breath sounds: Normal breath sounds.      Comments: Patient does have a history of asthma but it is currently stable.  Patient denies any coughing or wheezing.  Lungs are clear to auscultation.    Abdominal:      General: Bowel sounds are normal.      Palpations: Abdomen is soft.      Comments: Abdomen is soft and mildly obese, nontender, no hepatosplenomegaly.  No flank tenderness.  No suprapubic pain.  Positive bowel sounds x4.     Genitourinary:     Comments: Patient denies dysuria, no hematuria, denies flank pain.   Patient does have nocturia.  Musculoskeletal:         General: Normal range of motion.      Cervical back: Normal range of motion.      Right lower leg: Edema present.      Left lower leg: Edema present.      Comments: Patient had lumbar spinal surgery about a year and a half ago and is improving but still needs to use a cane for balance.  He does have a right foot drop that is chronic.  Patient has osteoarthritis in his knees and has seen orthopedics regarding this.  He also has arthritis in his hips.  Mild ankle edema.  Patient is on furosemide.   Skin:     General: Skin is warm.      Comments: Patient has 2 small warts on his left index finger and I will electrocauterized those today.   He denies any skin rashes but does have dry skin.   Neurological:      Mental Status: He is alert and  oriented to person, place, and time. Mental status is at baseline.      Coordination: Coordination abnormal (Patient has a foot drop and he has to be cautious about his balance.).      Gait: Gait abnormal (Does use a cane for balance and had spinal surgery and does have a right foot drop.).      Comments: Paresthesias from  lumbosacral disc dx.   Hx of peripheral neuropathy.  Normal muscle strength upper extremities and slightly decreased strength in the lower extremities.  Balance and coordination are abnormal from his foot drop.   Psychiatric:         Mood and Affect: Mood normal.         Behavior: Behavior normal.         Thought Content: Thought content normal.         Judgment: Judgment normal.      Comments: Patient does have hypochondriasis about health issues.  He worries daily about his health problems.  Thought content and judgment are stable aside from his constant worry and anxiety.  Mild forgetfulness for the age of 83 but is very good otherwise.  Behavior is normal.     PLAN : Patient is an 83-year-old male who has hypochondriasis about his health problems and he likes to have his sugar and INR checked frequently.  Today his blood sugar was 181 and his A1c was 7.6%.  Coumadin INR was stable at 2.0.  Patient also had 2 warts on his right index finger which I did electrocauterized.  He also received his B12 shot.  Patient's blood pressure and other vitals are stable and he will follow-up as needed.  He needed refills on 2 medications and also his test strips today.  He otherwise is doing well for the age of 83 and he will follow-up as needed.    Problem List Items Addressed This Visit       Diabetes mellitus without complication (CMS/[facility])    Relevant Orders    POCT fingerstick glucose manually resulted (Completed)    POCT Glycosylated Hemoglobin (HGB A1C) docked device    Anticoagulated on Coumadin    Relevant Orders    POCT INR manually resulted (Completed)            Luis Newmna DO

## 2023-11-08 ENCOUNTER — APPOINTMENT (OUTPATIENT)
Dept: PRIMARY CARE | Facility: CLINIC | Age: 83
End: 2023-11-08
Payer: MEDICARE

## 2023-11-13 ENCOUNTER — APPOINTMENT (OUTPATIENT)
Dept: PRIMARY CARE | Facility: CLINIC | Age: 83
End: 2023-11-13
Payer: MEDICARE

## 2023-11-14 ENCOUNTER — ANTICOAGULATION - WARFARIN VISIT (OUTPATIENT)
Dept: PHARMACY | Facility: CLINIC | Age: 83
End: 2023-11-14
Payer: MEDICARE

## 2023-11-14 DIAGNOSIS — I48.91 ATRIAL FIBRILLATION, UNSPECIFIED TYPE (MULTI): Primary | ICD-10-CM

## 2023-11-14 LAB
POC INR: 2.1
POC PROTHROMBIN TIME: NORMAL

## 2023-11-14 PROCEDURE — 99212 OFFICE O/P EST SF 10 MIN: CPT

## 2023-11-14 PROCEDURE — 85610 PROTHROMBIN TIME: CPT | Mod: QW

## 2023-11-14 NOTE — PROGRESS NOTES
Coumadin Clinic Visit Note    Patient verified warfarin dose  No missed doses  No unusual bruising or bleeding, although patient does have some scrapes on his arms that are bandaged  No changes to medications  Consistent dietary green intake  No anticipated procedures at this time  INR Therapeutic today at 2.1  No changes to warfarin dose today  Next appointment 5 weeks    Denisse Rucker, Pharm D

## 2023-11-16 DIAGNOSIS — S16.1XXA STRAIN OF NECK MUSCLE, INITIAL ENCOUNTER: ICD-10-CM

## 2023-11-16 DIAGNOSIS — M54.12 BRACHIAL (CERVICAL) NEURITIS: Primary | ICD-10-CM

## 2023-11-16 RX ORDER — TIZANIDINE 2 MG/1
2 TABLET ORAL EVERY 6 HOURS PRN
Qty: 30 TABLET | Refills: 1 | Status: SHIPPED | OUTPATIENT
Start: 2023-11-16 | End: 2023-12-12 | Stop reason: SINTOL

## 2023-11-16 RX ORDER — PREDNISONE 10 MG/1
10 TABLET ORAL 2 TIMES DAILY
Qty: 10 TABLET | Refills: 0 | Status: SHIPPED | OUTPATIENT
Start: 2023-11-16 | End: 2023-11-21

## 2023-12-12 ENCOUNTER — OFFICE VISIT (OUTPATIENT)
Dept: PRIMARY CARE | Facility: CLINIC | Age: 83
End: 2023-12-12
Payer: MEDICARE

## 2023-12-12 VITALS
BODY MASS INDEX: 31.1 KG/M2 | HEART RATE: 69 BPM | RESPIRATION RATE: 18 BRPM | HEIGHT: 69 IN | WEIGHT: 210 LBS | SYSTOLIC BLOOD PRESSURE: 108 MMHG | DIASTOLIC BLOOD PRESSURE: 62 MMHG | TEMPERATURE: 97.9 F | OXYGEN SATURATION: 96 %

## 2023-12-12 DIAGNOSIS — E55.9 VITAMIN D DEFICIENCY: ICD-10-CM

## 2023-12-12 DIAGNOSIS — I48.0 PAROXYSMAL ATRIAL FIBRILLATION (MULTI): ICD-10-CM

## 2023-12-12 DIAGNOSIS — E78.5 DYSLIPIDEMIA: ICD-10-CM

## 2023-12-12 DIAGNOSIS — M46.1 SACROILIITIS (CMS-HCC): ICD-10-CM

## 2023-12-12 DIAGNOSIS — Z98.890 STATUS POST LUMBAR SPINE OPERATIVE PROCEDURE FOR DECOMPRESSION OF SPINAL CORD: ICD-10-CM

## 2023-12-12 DIAGNOSIS — I25.118 CORONARY ARTERY DISEASE OF NATIVE ARTERY OF NATIVE HEART WITH STABLE ANGINA PECTORIS (CMS-HCC): ICD-10-CM

## 2023-12-12 DIAGNOSIS — G62.9 NEUROPATHY: ICD-10-CM

## 2023-12-12 DIAGNOSIS — E11.9 DIABETES MELLITUS WITHOUT COMPLICATION (MULTI): Primary | ICD-10-CM

## 2023-12-12 DIAGNOSIS — M17.0 PRIMARY OSTEOARTHRITIS OF BOTH KNEES: ICD-10-CM

## 2023-12-12 DIAGNOSIS — R09.89 LABILE HYPERTENSION: ICD-10-CM

## 2023-12-12 DIAGNOSIS — Z79.01 ANTICOAGULATED ON COUMADIN: ICD-10-CM

## 2023-12-12 DIAGNOSIS — Z12.5 SCREENING PSA (PROSTATE SPECIFIC ANTIGEN): ICD-10-CM

## 2023-12-12 LAB
25(OH)D3 SERPL-MCNC: 54 NG/ML (ref 30–100)
ALBUMIN SERPL BCP-MCNC: 4.3 G/DL (ref 3.4–5)
ALP SERPL-CCNC: 62 U/L (ref 33–136)
ALT SERPL W P-5'-P-CCNC: 15 U/L (ref 10–52)
ANION GAP SERPL CALC-SCNC: 15 MMOL/L (ref 10–20)
AST SERPL W P-5'-P-CCNC: 18 U/L (ref 9–39)
BILIRUB SERPL-MCNC: 0.8 MG/DL (ref 0–1.2)
BUN SERPL-MCNC: 20 MG/DL (ref 6–23)
CALCIUM SERPL-MCNC: 8.8 MG/DL (ref 8.6–10.6)
CHLORIDE SERPL-SCNC: 103 MMOL/L (ref 98–107)
CHOLEST SERPL-MCNC: 142 MG/DL (ref 0–199)
CHOLESTEROL/HDL RATIO: 4
CO2 SERPL-SCNC: 28 MMOL/L (ref 21–32)
CREAT SERPL-MCNC: 1.36 MG/DL (ref 0.5–1.3)
ERYTHROCYTE [DISTWIDTH] IN BLOOD BY AUTOMATED COUNT: 13.9 % (ref 11.5–14.5)
GFR SERPL CREATININE-BSD FRML MDRD: 51 ML/MIN/1.73M*2
GLUCOSE SERPL-MCNC: 162 MG/DL (ref 74–99)
HCT VFR BLD AUTO: 47.4 % (ref 41–52)
HDLC SERPL-MCNC: 35.5 MG/DL
HGB BLD-MCNC: 15.1 G/DL (ref 13.5–17.5)
LDLC SERPL CALC-MCNC: 61 MG/DL
MCH RBC QN AUTO: 28.5 PG (ref 26–34)
MCHC RBC AUTO-ENTMCNC: 31.9 G/DL (ref 32–36)
MCV RBC AUTO: 89 FL (ref 80–100)
NON HDL CHOLESTEROL: 107 MG/DL (ref 0–149)
NRBC BLD-RTO: 0 /100 WBCS (ref 0–0)
PLATELET # BLD AUTO: 170 X10*3/UL (ref 150–450)
POC FINGERSTICK BLOOD GLUCOSE: 175 MG/DL (ref 70–100)
POC INR: 2.3 (ref 0.9–1.1)
POTASSIUM SERPL-SCNC: 4.1 MMOL/L (ref 3.5–5.3)
PROT SERPL-MCNC: 6.9 G/DL (ref 6.4–8.2)
PSA SERPL-MCNC: 2.02 NG/ML
RBC # BLD AUTO: 5.3 X10*6/UL (ref 4.5–5.9)
SODIUM SERPL-SCNC: 142 MMOL/L (ref 136–145)
TRIGL SERPL-MCNC: 226 MG/DL (ref 0–149)
TSH SERPL-ACNC: 3.26 MIU/L (ref 0.44–3.98)
VLDL: 45 MG/DL (ref 0–40)
WBC # BLD AUTO: 7.1 X10*3/UL (ref 4.4–11.3)

## 2023-12-12 PROCEDURE — 1160F RVW MEDS BY RX/DR IN RCRD: CPT | Performed by: FAMILY MEDICINE

## 2023-12-12 PROCEDURE — 1159F MED LIST DOCD IN RCRD: CPT | Performed by: FAMILY MEDICINE

## 2023-12-12 PROCEDURE — 3078F DIAST BP <80 MM HG: CPT | Performed by: FAMILY MEDICINE

## 2023-12-12 PROCEDURE — 99214 OFFICE O/P EST MOD 30 MIN: CPT | Performed by: FAMILY MEDICINE

## 2023-12-12 PROCEDURE — 82306 VITAMIN D 25 HYDROXY: CPT

## 2023-12-12 PROCEDURE — 85027 COMPLETE CBC AUTOMATED: CPT

## 2023-12-12 PROCEDURE — 36415 COLL VENOUS BLD VENIPUNCTURE: CPT

## 2023-12-12 PROCEDURE — 80053 COMPREHEN METABOLIC PANEL: CPT

## 2023-12-12 PROCEDURE — 1126F AMNT PAIN NOTED NONE PRSNT: CPT | Performed by: FAMILY MEDICINE

## 2023-12-12 PROCEDURE — 85610 PROTHROMBIN TIME: CPT | Performed by: FAMILY MEDICINE

## 2023-12-12 PROCEDURE — 3074F SYST BP LT 130 MM HG: CPT | Performed by: FAMILY MEDICINE

## 2023-12-12 PROCEDURE — 80061 LIPID PANEL: CPT

## 2023-12-12 PROCEDURE — G0103 PSA SCREENING: HCPCS

## 2023-12-12 PROCEDURE — 84443 ASSAY THYROID STIM HORMONE: CPT

## 2023-12-12 PROCEDURE — 82962 GLUCOSE BLOOD TEST: CPT | Performed by: FAMILY MEDICINE

## 2023-12-12 PROCEDURE — 1036F TOBACCO NON-USER: CPT | Performed by: FAMILY MEDICINE

## 2023-12-12 ASSESSMENT — PAIN SCALES - GENERAL: PAINLEVEL: 0-NO PAIN

## 2023-12-12 NOTE — PROGRESS NOTES
Subjective   Trell Aguila is a 83 y.o. male who presents for Follow-up (Follow up visit for Coumadin check).    HPI :   Patient in for High blood pressure recheck and diabetes recheck.  Patient would also like his  cholesterol and liver enzymes rechecked.He still complains about neck spasm and restriction of motion in the neck and upper back region but the muscle relaxers gave him side effects, So he could not take them.  He also did take a short course of prednisone which helped somewhat , but due to his diabetes he can not stay on prednisone.      Objective  : ROS :10 systems were reviewed and the information is included in the HPI and no additional review of systems is indicated.    Physical Exam  Vitals and nursing note reviewed.   Constitutional:       Appearance: Normal appearance. He is obese.      Comments: Patient is alert and oriented x3.   No acute distress and is trying to watch his diet better.   HENT:      Head: Normocephalic.      Right Ear: Tympanic membrane and external ear normal.      Left Ear: Tympanic membrane and external ear normal.      Ears:      Comments: Ears are patent bilaterally and TMs are clear.     Nose: Nose normal.      Mouth/Throat:      Mouth: Mucous membranes are moist.      Pharynx: Oropharynx is clear.      Comments: Mouth is moist, tongue is midline.  No posterior pharyngeal erythema.  Eyes:      Extraocular Movements: Extraocular movements intact.      Conjunctiva/sclera: Conjunctivae normal.      Pupils: Pupils are equal, round, and reactive to light.      Comments: Patient denies visual disturbance and does try to have his eyes checked once per year.   Neck:      Comments:    Restricted range of motion cervical spine due to degenerative disc disease, and muscle spasm.  Does get symptoms of cervical neuritis.  He had taken prednisone last month and it did help slightly but he could not tolerate the muscle relaxer.  No carotid bruits, no thyromegaly, no cervical adenopathy.     Cardiovascular:      Rate and Rhythm: Normal rate and regular rhythm.      Pulses: Normal pulses.      Heart sounds: Normal heart sounds.      Comments: Patient follows closely with cardiology and has an upcoming appointment next week.  Patient denies chest pain and no palpitations.  Heart rhythm is stable S1 and S2 are noted.   Pulmonary:      Effort: Pulmonary effort is normal.      Breath sounds: Normal breath sounds.      Comments: Patient denies any coughing or wheezing.  Lungs are clear to auscultation.    Abdominal:      General: Bowel sounds are normal.      Palpations: Abdomen is soft.      Comments: Abdomen is soft and mildly obese but non tender.     No flank tenderness.  No suprapubic pain.  Positive bowel sounds x4.     Genitourinary:     Comments: Patient denies dysuria, no hematuria, denies flank pain.  Patient does have occasional nocturia.  Worse when his sugar is elevated.  Musculoskeletal:         General: Tenderness present.      Cervical back: Tenderness present.      Comments: Patient had lumbar spine surgery at least a year and a half ago almost 2 years.  He is ambulating with a cane and he has to be cautious of his balance due to a chronic left foot drop.  He also has arthritis in the cervical and thoracic regions.  Patient also has arthritis in his shoulders and knees.  He has seen orthopedics for gel shots in his right knee.  No ankle edema.   Skin:     General: Skin is warm.      Findings: Rash present.      Comments:  Frequent yeast infection  when sugar is elevated.    Bruising from Coumadin.   Patient also has a rash on his upper chest from a group change.  He also has a few irritated papules on his lower back area.  He will apply some cortisone cream.   Neurological:      Mental Status: He is alert and oriented to person, place, and time. Mental status is at baseline.      Comments: Patient does have a left foot drop for several years even prior to his lumbar spine surgery.  Does  have some peripheral diabetic neuropathy.  Patient's coordination is not very good and he must use a cane or a walker to support his balance.  He also has some muscle weakness in the upper and lower extremities.   Psychiatric:         Behavior: Behavior normal.         Thought Content: Thought content normal.         Judgment: Judgment normal.      Comments: Patient has chronic anxiety and OCD behavior concerning his health problems.  We have had a long discussion about this since everything cannot be repaired and he has to live with some of his arthritic problems.  Thought content and judgment are stable.  No signs of vascular dementia.  Behavior is normal.     PLAN : Patient is a 83-year-old diabetic male who is in for recheck and complete blood work and also blood sugar and A1c.  He also has his Coumadin checked at the Coumadin clinic and wanted it rechecked today to make sure that it was within normal range.  His INR was 2.3% and stable.  His blood sugar was 175 and his A1c was still elevated at 7.9%.  He had been on some prednisone for cervical disc problems several weeks ago which did increase his sugar.  Patient will be notified of his other blood results in 4 days and further recommendations will be made at that time.  He was also worried that his blood pressure was a little bit on the low side today but he did not have any symptoms of dizziness or lightheadedness.  He will discuss this with cardiology since he has an appointment next week.  Patient will follow-up as needed pending the blood work results.    Problem List Items Addressed This Visit       Diabetes mellitus without complication (CMS/HCC)    Relevant Orders    POCT fingerstick glucose manually resulted    POCT Glycosylated Hemoglobin (HGB A1C) docked device    Paroxysmal atrial fibrillation (CMS/HCC)    Sacroiliitis (CMS/HCC)    Status post lumbar spine operative procedure for decompression of spinal cord    Neuropathy - Primary     Anticoagulated on Coumadin    Relevant Orders    POCT INR manually resulted            Luis Newman DO

## 2023-12-14 NOTE — RESULT ENCOUNTER NOTE
Cholesterol is normal at 142      triglycerides are borderline at 226 they should be under 150        watch the fats in the diet.      Kidney and liver function are stable     Red and white blood cell counts are stable       Thyroid function is stable    vitamin D is normal     prostate level is normal at 2.02     blood work looks stable just try to watch the fats in the diet and the diabetes.

## 2023-12-18 PROBLEM — E66.9 OBESITY, UNSPECIFIED: Status: ACTIVE | Noted: 2021-04-02

## 2023-12-18 PROBLEM — J45.909 UNSPECIFIED ASTHMA, UNCOMPLICATED (HHS-HCC): Status: ACTIVE | Noted: 2021-04-02

## 2023-12-18 PROBLEM — K62.89 IRRITATION OF SKIN OF PERIANAL REGION: Status: ACTIVE | Noted: 2023-12-18

## 2023-12-18 PROBLEM — B37.9 YEAST INFECTION: Status: ACTIVE | Noted: 2023-12-18

## 2023-12-18 PROBLEM — S49.90XA SHOULDER INJURY: Status: ACTIVE | Noted: 2023-12-18

## 2023-12-18 PROBLEM — M62.81 MUSCLE WEAKNESS (GENERALIZED): Status: ACTIVE | Noted: 2021-04-02

## 2023-12-18 PROBLEM — U07.1 COVID-19 VIREMIA: Status: ACTIVE | Noted: 2023-12-18

## 2023-12-18 PROBLEM — M25.551 BILATERAL HIP PAIN: Status: ACTIVE | Noted: 2023-12-18

## 2023-12-18 PROBLEM — J45.909 ASTHMATIC BRONCHITIS (HHS-HCC): Status: ACTIVE | Noted: 2023-12-18

## 2023-12-18 PROBLEM — R26.2 DIFFICULTY IN WALKING, NOT ELSEWHERE CLASSIFIED: Status: ACTIVE | Noted: 2021-04-02

## 2023-12-18 PROBLEM — L91.8 SKIN TAG: Status: RESOLVED | Noted: 2023-02-09 | Resolved: 2023-12-18

## 2023-12-18 PROBLEM — H40.9 UNSPECIFIED GLAUCOMA: Status: ACTIVE | Noted: 2021-04-02

## 2023-12-18 PROBLEM — S51.819A: Status: ACTIVE | Noted: 2023-12-18

## 2023-12-18 PROBLEM — I11.0 HYPERTENSIVE HEART DISEASE WITH HEART FAILURE (MULTI): Status: RESOLVED | Noted: 2021-04-02 | Resolved: 2023-12-18

## 2023-12-18 PROBLEM — S70.01XA CONTUSION OF RIGHT HIP: Status: ACTIVE | Noted: 2023-12-18

## 2023-12-18 PROBLEM — M19.011 PRIMARY OSTEOARTHRITIS, RIGHT SHOULDER: Status: ACTIVE | Noted: 2021-04-02

## 2023-12-18 PROBLEM — I50.9 HEART FAILURE, UNSPECIFIED (MULTI): Status: ACTIVE | Noted: 2021-04-02

## 2023-12-18 PROBLEM — S50.12XD: Status: ACTIVE | Noted: 2023-12-18

## 2023-12-18 PROBLEM — B07.9 VIRAL WART, UNSPECIFIED: Status: RESOLVED | Noted: 2023-02-09 | Resolved: 2023-12-18

## 2023-12-18 PROBLEM — K08.9 DENTAL DISORDER: Status: RESOLVED | Noted: 2023-12-18 | Resolved: 2023-12-18

## 2023-12-18 PROBLEM — I49.9 CARDIAC ARRHYTHMIA, UNSPECIFIED: Status: ACTIVE | Noted: 2021-04-02

## 2023-12-18 PROBLEM — M25.552 BILATERAL HIP PAIN: Status: ACTIVE | Noted: 2023-12-18

## 2023-12-18 PROBLEM — I11.0 HYPERTENSIVE HEART DISEASE WITH HEART FAILURE (MULTI): Status: ACTIVE | Noted: 2021-04-02

## 2023-12-18 PROBLEM — K08.9 DENTAL DISORDER: Status: ACTIVE | Noted: 2023-12-18

## 2023-12-18 PROBLEM — I50.22 CHF (CONGESTIVE HEART FAILURE), NYHA CLASS II, CHRONIC, SYSTOLIC (MULTI): Status: ACTIVE | Noted: 2023-02-08

## 2023-12-18 PROBLEM — B07.9 VIRAL WART ON FINGER: Status: ACTIVE | Noted: 2023-12-18

## 2023-12-19 ENCOUNTER — OFFICE VISIT (OUTPATIENT)
Dept: CARDIOLOGY | Facility: CLINIC | Age: 83
End: 2023-12-19
Payer: MEDICARE

## 2023-12-19 VITALS
HEIGHT: 70 IN | BODY MASS INDEX: 30.06 KG/M2 | DIASTOLIC BLOOD PRESSURE: 80 MMHG | OXYGEN SATURATION: 96 % | SYSTOLIC BLOOD PRESSURE: 108 MMHG | HEART RATE: 67 BPM | WEIGHT: 210 LBS

## 2023-12-19 DIAGNOSIS — I25.118 CORONARY ARTERY DISEASE OF NATIVE ARTERY OF NATIVE HEART WITH STABLE ANGINA PECTORIS (CMS-HCC): ICD-10-CM

## 2023-12-19 DIAGNOSIS — I48.0 PAROXYSMAL ATRIAL FIBRILLATION (MULTI): ICD-10-CM

## 2023-12-19 DIAGNOSIS — I10 BENIGN ESSENTIAL HYPERTENSION: ICD-10-CM

## 2023-12-19 DIAGNOSIS — E78.49 OTHER HYPERLIPIDEMIA: ICD-10-CM

## 2023-12-19 DIAGNOSIS — I10 PRIMARY HYPERTENSION: Primary | ICD-10-CM

## 2023-12-19 DIAGNOSIS — I34.0 MODERATE MITRAL REGURGITATION: ICD-10-CM

## 2023-12-19 DIAGNOSIS — I35.0 MILD AORTIC STENOSIS: ICD-10-CM

## 2023-12-19 DIAGNOSIS — Z95.5 HISTORY OF CORONARY ARTERY STENT PLACEMENT: ICD-10-CM

## 2023-12-19 DIAGNOSIS — I50.22 CHF (CONGESTIVE HEART FAILURE), NYHA CLASS II, CHRONIC, SYSTOLIC (MULTI): ICD-10-CM

## 2023-12-19 PROBLEM — I48.91 ATRIAL FIBRILLATION (MULTI): Status: RESOLVED | Noted: 2023-10-10 | Resolved: 2023-12-19

## 2023-12-19 PROBLEM — I49.9 CARDIAC ARRHYTHMIA, UNSPECIFIED: Status: RESOLVED | Noted: 2021-04-02 | Resolved: 2023-12-19

## 2023-12-19 PROBLEM — R09.89 LABILE HYPERTENSION: Status: RESOLVED | Noted: 2023-12-12 | Resolved: 2023-12-19

## 2023-12-19 PROCEDURE — 99214 OFFICE O/P EST MOD 30 MIN: CPT | Performed by: INTERNAL MEDICINE

## 2023-12-19 PROCEDURE — 1160F RVW MEDS BY RX/DR IN RCRD: CPT | Performed by: INTERNAL MEDICINE

## 2023-12-19 PROCEDURE — 1126F AMNT PAIN NOTED NONE PRSNT: CPT | Performed by: INTERNAL MEDICINE

## 2023-12-19 PROCEDURE — 1036F TOBACCO NON-USER: CPT | Performed by: INTERNAL MEDICINE

## 2023-12-19 PROCEDURE — 3074F SYST BP LT 130 MM HG: CPT | Performed by: INTERNAL MEDICINE

## 2023-12-19 PROCEDURE — 1159F MED LIST DOCD IN RCRD: CPT | Performed by: INTERNAL MEDICINE

## 2023-12-19 PROCEDURE — 3079F DIAST BP 80-89 MM HG: CPT | Performed by: INTERNAL MEDICINE

## 2023-12-19 RX ORDER — AMLODIPINE BESYLATE 5 MG/1
5 TABLET ORAL DAILY
COMMUNITY
End: 2023-12-19 | Stop reason: ALTCHOICE

## 2023-12-19 RX ORDER — ASPIRIN 81 MG/1
1 TABLET ORAL DAILY
COMMUNITY

## 2023-12-19 RX ORDER — ACETAMINOPHEN 500 MG
TABLET ORAL
COMMUNITY
End: 2024-01-31 | Stop reason: WASHOUT

## 2023-12-19 ASSESSMENT — ENCOUNTER SYMPTOMS
DYSPNEA ON EXERTION: 1
NECK PAIN: 1

## 2023-12-19 NOTE — PROGRESS NOTES
Subjective   Trell Aguila is a 83 y.o. male.    Chief Complaint:  Follow-up coronary artery disease, hypertension, valvular heart disease, atrial fibrillation.    HPI    Much of his activity levels are limited because of arthritis issues.  He had problems with his right knee.  He has also had back surgery although this has had a reasonably good result.  He does have some neck problems and clearly has symptoms consistent with cervical spine disease.  Denies palpitations or tachycardia.  Has had no syncopal events.    An echocardiographic study in December 2021 demonstrated normal left ventricular systolic function. There was moderate mitral regurgitation present.     In April 2021 he underwent lumbar spine surgery He had lumbar disc 1-5 repaired. He tolerated the procedure well and did not have any significant cardiac complications. He was able to discontinue anticoagulation and then restarted post procedure. He complains of exertional dyspnea. His activity levels are still very limited. He is getting around with a walker. He does not have any back pain. Also no sciatica. No typical anginal symptoms such as chest pressure or chest heaviness.     A stress thallium study performed on June 27, 2018 demonstrated fixed inferior lateral defects with a low normal left ventricular ejection fraction.     Cardiac catheterization in 2003 demonstrated to 25% left main cornea artery 25% left anterior descending coronary artery total occlusion of the circumflex was cornea artery and a 99% right coronary artery stenosis treated with balloon angioplasty and stent placement     Other cardiac problems including history of hypertension hyperlipidemia. He has chronic atrial fibrillation.     Allergies  Medication    · Clindamycin HCl CAPS   Recorded By: Michelle Florence; 10/21/2014 9:12:11 AM   · Codeine Derivatives   Recorded By: Michelle Florence; 10/21/2014 9:12:11 AM   · Penicillins   Recorded By: Michelle Florence; 10/21/2014 9:12:11 AM   ·  Sulfa Drugs   Recorded By: Michelle Florence; 10/21/2014 9:12:11 AM     Family History  Mother    · Family history of diabetes mellitus (V18.0) (Z83.3)  Father    · Family history of asthma (V17.5) (Z82.5)  Sister    · Family history of hypertension (V17.49) (Z82.49)   · Family history of malignant neoplasm (V16.9) (Z80.9)     Social History  Problems    · Does not use illicit drugs (V49.89) (Z78.9)   · Exercise frequency (times/week)   · pt does not exercise   · Former smoker (V15.82) (Z87.891)   · quit in dec 8 1997   · No alcohol use          Review of Systems   Constitutional: Positive for malaise/fatigue.   Cardiovascular:  Positive for dyspnea on exertion.   Musculoskeletal:  Positive for arthritis, joint pain and neck pain.       Visit Vitals  Smoking Status Never        Objective     Constitutional:       Appearance: Not in distress.   Neck:      Vascular: JVD normal.   Pulmonary:      Breath sounds: Normal breath sounds.   Cardiovascular:      Normal rate. Regular rhythm. S1 with normal intensity. S2 with normal intensity.       Murmurs: There is a grade 1/6 systolic murmur.      No gallop.    Pulses:     Intact distal pulses.   Edema:     Peripheral edema present.     Pretibial: bilateral 1+ edema of the pretibial area.     Ankle: bilateral 1+ edema of the ankle.  Abdominal:      General: Bowel sounds are normal.   Neurological:      Mental Status: Alert and oriented to person, place and time.         Lab Review:   Lab Results   Component Value Date     12/12/2023    K 4.1 12/12/2023     12/12/2023    CO2 28 12/12/2023    BUN 20 12/12/2023    CREATININE 1.36 (H) 12/12/2023    GLUCOSE 162 (H) 12/12/2023    CALCIUM 8.8 12/12/2023     Lab Results   Component Value Date    CHOL 142 12/12/2023    TRIG 226 (H) 12/12/2023    HDL 35.5 12/12/2023       Assessment:    1.  Coronary artery disease.  Last intervention was in 2003.  Since that time he has not required any interventions.  He is minimally  symptomatic although it should be noted that his activity levels are limited.  Should we require any procedures in the future we would do an imaging study.    2.  Systolic congestive heart failure.  His latest echo study showed mild left ventricular systolic dysfunction.  No heart failure by exam.  We did start Jardiance.  He does have a new rash.  We have suggested that he stop the medication.  If his rash does not change then he can go back on the medication.    3.  Paroxysmal atrial fibrillation.  He is on anticoagulation therapy.  Has had no evidence of bleeds.  Denies palpitations or tachycardia.    4.  History of hypertension.  Actually hypotensive.  We are going to reduce his Lasix to 40 mg daily.  Amlodipine has been stopped.  We did review all his medications.  Should he continue to be hypotensive, we could reduce the losartan to 50 mg daily.

## 2023-12-19 NOTE — PATIENT INSTRUCTIONS
Reduce the furosemide 40 mg one daily.    No changes in your other medications.    Stop the Jardiance in one month.  If your rash goes away stop the Jardiance.  If the rash does not go away restart the jardiance.[id number]

## 2023-12-27 ENCOUNTER — ANTICOAGULATION - WARFARIN VISIT (OUTPATIENT)
Dept: PHARMACY | Facility: CLINIC | Age: 83
End: 2023-12-27
Payer: MEDICARE

## 2023-12-27 DIAGNOSIS — I48.91 ATRIAL FIBRILLATION, UNSPECIFIED TYPE (MULTI): Primary | ICD-10-CM

## 2023-12-27 LAB
POC INR: 2
POC PROTHROMBIN TIME: NORMAL

## 2023-12-27 PROCEDURE — 85610 PROTHROMBIN TIME: CPT | Performed by: PHARMACIST

## 2023-12-27 PROCEDURE — 99212 OFFICE O/P EST SF 10 MIN: CPT | Performed by: PHARMACIST

## 2023-12-27 NOTE — PROGRESS NOTES
Trell Aguila is a 83 y.o. male with history of atrial fibrillation who presents today for anticoagulation monitoring and adjustment.  INR 2 is therapeutic for this patient (goal range 2-3) and is reflective of 25 mg TWD  Patient verifies current dosing regimen, patient able to verbally recall dose  Patient reports 0 missed doses since last INR  Last INR 2.1 on 11/14/23 (6 week interval)  Patient had INR of 2.3 on 12/12/23 as well   Patient denies s/sx clotting and/or stroke  Patient denies hematuria, epistaxis, rectal bleeding  Patient denies changes in diet, alcohol, or tobacco use  Vegetable intake consistent from week to week  Reviewed medication list and drug allergies with patient, updated any medication additions or modifications accordingly  Acetaminophen intake: no changes   Patient also denies any pending medical or dental procedures scheduled at this time  Patient is going to the dentist 1/24/24 - he does take 1000mg azithromycin before his appointment and the day after.   Patient was instructed to continue with current therapy of warfarin 25mg TWD and RTC 5 weeks    Zuly Barrios, RebecaD, BCPS   12/27/2023 11:26 AM

## 2024-01-17 ENCOUNTER — OFFICE VISIT (OUTPATIENT)
Dept: PRIMARY CARE | Facility: CLINIC | Age: 84
End: 2024-01-17
Payer: MEDICARE

## 2024-01-17 VITALS
RESPIRATION RATE: 18 BRPM | DIASTOLIC BLOOD PRESSURE: 72 MMHG | HEIGHT: 70 IN | SYSTOLIC BLOOD PRESSURE: 110 MMHG | HEART RATE: 65 BPM | TEMPERATURE: 97.9 F | WEIGHT: 211 LBS | OXYGEN SATURATION: 95 % | BODY MASS INDEX: 30.21 KG/M2

## 2024-01-17 DIAGNOSIS — R60.0 BILATERAL LEG EDEMA: Primary | ICD-10-CM

## 2024-01-17 DIAGNOSIS — M54.12 BRACHIAL (CERVICAL) NEURITIS: ICD-10-CM

## 2024-01-17 DIAGNOSIS — M47.819 DEGENERATIVE SPINAL ARTHRITIS: ICD-10-CM

## 2024-01-17 DIAGNOSIS — I48.0 PAROXYSMAL ATRIAL FIBRILLATION (MULTI): ICD-10-CM

## 2024-01-17 DIAGNOSIS — I50.22 CHF (CONGESTIVE HEART FAILURE), NYHA CLASS II, CHRONIC, SYSTOLIC (MULTI): ICD-10-CM

## 2024-01-17 DIAGNOSIS — D68.9 COAGULATION DEFECT, UNSPECIFIED (MULTI): ICD-10-CM

## 2024-01-17 DIAGNOSIS — I25.118 CORONARY ARTERY DISEASE OF NATIVE ARTERY OF NATIVE HEART WITH STABLE ANGINA PECTORIS (CMS-HCC): ICD-10-CM

## 2024-01-17 DIAGNOSIS — Z79.01 ANTICOAGULATED ON COUMADIN: ICD-10-CM

## 2024-01-17 DIAGNOSIS — E11.9 DIABETES MELLITUS WITHOUT COMPLICATION (MULTI): ICD-10-CM

## 2024-01-17 DIAGNOSIS — F41.1 GENERALIZED ANXIETY DISORDER: ICD-10-CM

## 2024-01-17 DIAGNOSIS — R26.9 ABNORMAL GAIT: ICD-10-CM

## 2024-01-17 DIAGNOSIS — B37.9 YEAST INFECTION: ICD-10-CM

## 2024-01-17 DIAGNOSIS — E11.42 DIABETIC POLYNEUROPATHY ASSOCIATED WITH TYPE 2 DIABETES MELLITUS (MULTI): ICD-10-CM

## 2024-01-17 PROBLEM — C43.9 MELANOMA (MULTI): Status: RESOLVED | Noted: 2023-02-09 | Resolved: 2024-01-17

## 2024-01-17 PROBLEM — M46.1 SACROILIITIS (CMS-HCC): Status: RESOLVED | Noted: 2023-02-09 | Resolved: 2024-01-17

## 2024-01-17 PROBLEM — L98.499: Status: RESOLVED | Noted: 2023-02-09 | Resolved: 2024-01-17

## 2024-01-17 LAB
HBA1C MFR BLD: 8.9 % (ref 4.2–6.5)
POC FINGERSTICK BLOOD GLUCOSE: 170 MG/DL (ref 70–100)
POC INR: 2.6 (ref 0.9–1.1)

## 2024-01-17 PROCEDURE — 82962 GLUCOSE BLOOD TEST: CPT | Performed by: FAMILY MEDICINE

## 2024-01-17 PROCEDURE — 1160F RVW MEDS BY RX/DR IN RCRD: CPT | Performed by: FAMILY MEDICINE

## 2024-01-17 PROCEDURE — 3078F DIAST BP <80 MM HG: CPT | Performed by: FAMILY MEDICINE

## 2024-01-17 PROCEDURE — 1036F TOBACCO NON-USER: CPT | Performed by: FAMILY MEDICINE

## 2024-01-17 PROCEDURE — 1170F FXNL STATUS ASSESSED: CPT | Performed by: FAMILY MEDICINE

## 2024-01-17 PROCEDURE — 99214 OFFICE O/P EST MOD 30 MIN: CPT | Performed by: FAMILY MEDICINE

## 2024-01-17 PROCEDURE — 85610 PROTHROMBIN TIME: CPT | Performed by: FAMILY MEDICINE

## 2024-01-17 PROCEDURE — 1126F AMNT PAIN NOTED NONE PRSNT: CPT | Performed by: FAMILY MEDICINE

## 2024-01-17 PROCEDURE — 83036 HEMOGLOBIN GLYCOSYLATED A1C: CPT | Mod: CLIA WAIVED TEST | Performed by: FAMILY MEDICINE

## 2024-01-17 PROCEDURE — 3074F SYST BP LT 130 MM HG: CPT | Performed by: FAMILY MEDICINE

## 2024-01-17 PROCEDURE — 1159F MED LIST DOCD IN RCRD: CPT | Performed by: FAMILY MEDICINE

## 2024-01-17 ASSESSMENT — ENCOUNTER SYMPTOMS
DEPRESSION: 0
OCCASIONAL FEELINGS OF UNSTEADINESS: 1
LOSS OF SENSATION IN FEET: 0

## 2024-01-17 ASSESSMENT — PAIN SCALES - GENERAL: PAINLEVEL: 0-NO PAIN

## 2024-01-17 ASSESSMENT — ACTIVITIES OF DAILY LIVING (ADL)
GROCERY_SHOPPING: INDEPENDENT
BATHING: INDEPENDENT
DRESSING: INDEPENDENT
MANAGING_FINANCES: INDEPENDENT
TAKING_MEDICATION: INDEPENDENT
DOING_HOUSEWORK: NEEDS ASSISTANCE

## 2024-01-17 ASSESSMENT — PATIENT HEALTH QUESTIONNAIRE - PHQ9
SUM OF ALL RESPONSES TO PHQ9 QUESTIONS 1 AND 2: 0
1. LITTLE INTEREST OR PLEASURE IN DOING THINGS: NOT AT ALL
2. FEELING DOWN, DEPRESSED OR HOPELESS: NOT AT ALL

## 2024-01-17 NOTE — PATIENT INSTRUCTIONS

## 2024-01-17 NOTE — PROGRESS NOTES
Subjective   Trell Aguila is a 83 y.o. male who presents for Medicare Annual Wellness Visit Subsequent and Follow-up (patient here for annual Medicare wellness visit today).    HPI  :   Patient is an 83-year-old diabetic male who is in for recheck on his blood sugar, Coumadin level and A1c.  He also has a skin rash and he is seeing dermatology and recently had a skin biopsy to try to find out what is causing his recurrent skin rash.  Patient also follows with cardiology and orthopedics.  He recently had a gel shot in his right knee and it is stable.  He did stop taking his Jardiance since he was thinking that the rash was caused by the Jardiance.  We are waiting for the biopsy report on  the skin biopsy.      Objective  : ROS : 10 systems were reviewed and the information is included in the HPI and no additional review of systems is indicated.    Physical Exam  Vitals and nursing note reviewed.   Constitutional:       Appearance: Normal appearance. He is obese.      Comments: Patient is alert and oriented x3.   No acute distress and is trying to watch his diet better.   HENT:      Head: Normocephalic.      Right Ear: Tympanic membrane and external ear normal.      Left Ear: Tympanic membrane and external ear normal.      Ears:      Comments: Ears are patent bilaterally and TMs are clear.     Nose: Nose normal.      Mouth/Throat:      Mouth: Mucous membranes are moist.      Pharynx: Oropharynx is clear.      Comments: Mouth is moist, tongue is midline.  No posterior pharyngeal erythema.  Eyes:      Extraocular Movements: Extraocular movements intact.      Conjunctiva/sclera: Conjunctivae normal.      Pupils: Pupils are equal, round, and reactive to light.      Comments: Patient denies visual disturbance and does have his eyes checked yearly.   Neck:      Comments: Restricted range of motion cervical spine due to arthritis and spasm.  No carotid bruits, no thyromegaly, no cervical adenopathy.    Cardiovascular:       Rate and Rhythm: Normal rate and regular rhythm.      Pulses: Normal pulses.      Heart sounds: Normal heart sounds.      Comments: Paroxysmal atrial fibrillation. Patient denies chest pain or palpitations.  Heart rhythm is stable S1 and S2 are noted, currently in sinus rhythm today.  Pulmonary:      Effort: Pulmonary effort is normal.      Breath sounds: Rhonchi present.      Comments: Few  scattered rhonchi to auscultation.  Wife was sick.  Abdominal:      General: Bowel sounds are normal.      Palpations: Abdomen is soft.      Comments: Abdomen is soft and obese and difficult to evaluate.  No abdominal masses and no flank pain.   Genitourinary:     Comments: Patient denies dysuria, no hematuria,  denies flank pain.  Patient does get nocturia, especially when his sugar is elevated.  Musculoskeletal:         General: Tenderness present.      Cervical back: Normal range of motion.      Comments: Patient had lumbar spine surgery approximately 2 years ago with fusion and stabilization.  He does ambulate with a cane and has to be cautious with his balance.  He has improved and continues to improve.  He does have issues with his right knee and is receiving gel shots from orthopedics.   Skin:     General: Skin is warm and dry.      Findings: Bruising and rash (Saw Dermatology for Rash.) present.      Comments: Saw dermatology and had skin biopy  for rash.  He also has tenia cruri's in the groin that has improved.  Patient also gets bruising from his Coumadin.   Neurological:      Mental Status: He is alert and oriented to person, place, and time. Mental status is at baseline.      Sensory: Sensory deficit present.      Motor: Weakness present.      Coordination: Coordination abnormal.      Gait: Gait abnormal.      Deep Tendon Reflexes: Reflexes abnormal.      Comments: Patient does have paresthesias in his lower extremity from lumbosacral disc disease.  Does have a left foot drop.  Coordination and gait are unsteady and  he does use a cane.  Abnormal reflexes lower extremities.  There is some muscle weakness in the lower extremities but normal strength in the upper extremities.   Psychiatric:         Thought Content: Thought content normal.         Judgment: Judgment normal.      Comments: Patient has chronic anxiety and OCD behavior.  He is always worried about his health problems, his blood sugar and his Coumadin level.  He overreacts to any type of health problems or illness.  He currently is stable.     PLAN :    Patient is an 83-year-old diabetic male who also has significant anxiety and OCD behavior concerning his health issues.  His blood sugar today was 170 and his A1c did go up to 8.9%.  He needs to watch his diet better and he is starting to use some basal insulin at night.  Coumadin INR today was 2.6%.  His blood pressure and other vitals were stable.  He is very worried about his skin rash and he did see dermatology on 2 occasions and had a skin biopsy last week.  He is still waiting for the report and the skin biopsy.  Patient is also seeing orthopedics and had gel injections in his right knee and is hoping it will help some of the arthritis.  He otherwise is stable and will follow-up as needed.  He was encouraged to try to watch his diabetes better and lower his A1c.  He also has a follow-up appointment next month with cardiology.    Problem List Items Addressed This Visit       Abnormal gait    Bilateral leg edema - Primary    Brachial (cervical) neuritis            Luis Newman,

## 2024-01-18 ENCOUNTER — LAB (OUTPATIENT)
Dept: LAB | Facility: LAB | Age: 84
End: 2024-01-18
Payer: MEDICARE

## 2024-01-18 DIAGNOSIS — G71.038 LIMB-GIRDLE MUSCULAR DYSTROPHY R21 ASSOCIATED WITH MUTATION IN POGLUT1 GENE (MULTI): ICD-10-CM

## 2024-01-18 DIAGNOSIS — L12.0 BULLOUS PEMPHIGOID (MULTI): Primary | ICD-10-CM

## 2024-01-18 LAB
BASOPHILS # BLD AUTO: 0.06 X10*3/UL (ref 0–0.1)
BASOPHILS NFR BLD AUTO: 0.9 %
EOSINOPHIL # BLD AUTO: 0.33 X10*3/UL (ref 0–0.4)
EOSINOPHIL NFR BLD AUTO: 5.2 %
ERYTHROCYTE [DISTWIDTH] IN BLOOD BY AUTOMATED COUNT: 13.8 % (ref 11.5–14.5)
HCT VFR BLD AUTO: 42.6 % (ref 41–52)
HGB BLD-MCNC: 14 G/DL (ref 13.5–17.5)
IMM GRANULOCYTES # BLD AUTO: 0.02 X10*3/UL (ref 0–0.5)
IMM GRANULOCYTES NFR BLD AUTO: 0.3 % (ref 0–0.9)
LYMPHOCYTES # BLD AUTO: 1.49 X10*3/UL (ref 0.8–3)
LYMPHOCYTES NFR BLD AUTO: 23.4 %
MCH RBC QN AUTO: 29.2 PG (ref 26–34)
MCHC RBC AUTO-ENTMCNC: 32.9 G/DL (ref 32–36)
MCV RBC AUTO: 89 FL (ref 80–100)
MONOCYTES # BLD AUTO: 0.65 X10*3/UL (ref 0.05–0.8)
MONOCYTES NFR BLD AUTO: 10.2 %
NEUTROPHILS # BLD AUTO: 3.82 X10*3/UL (ref 1.6–5.5)
NEUTROPHILS NFR BLD AUTO: 60 %
NRBC BLD-RTO: 0 /100 WBCS (ref 0–0)
PLATELET # BLD AUTO: 154 X10*3/UL (ref 150–450)
RBC # BLD AUTO: 4.8 X10*6/UL (ref 4.5–5.9)
WBC # BLD AUTO: 6.4 X10*3/UL (ref 4.4–11.3)

## 2024-01-18 PROCEDURE — 36415 COLL VENOUS BLD VENIPUNCTURE: CPT

## 2024-01-18 PROCEDURE — 85025 COMPLETE CBC W/AUTO DIFF WBC: CPT

## 2024-01-21 LAB — BM IGG SER IF-ACNC: 0 AU/ML (ref 0–19)

## 2024-01-31 ENCOUNTER — ANTICOAGULATION - WARFARIN VISIT (OUTPATIENT)
Dept: PHARMACY | Facility: CLINIC | Age: 84
End: 2024-01-31
Payer: MEDICARE

## 2024-01-31 DIAGNOSIS — I48.91 ATRIAL FIBRILLATION, UNSPECIFIED TYPE (MULTI): Primary | ICD-10-CM

## 2024-01-31 LAB
POC INR: 3.1
POC PROTHROMBIN TIME: NORMAL

## 2024-01-31 PROCEDURE — 99212 OFFICE O/P EST SF 10 MIN: CPT

## 2024-01-31 PROCEDURE — 85610 PROTHROMBIN TIME: CPT | Mod: QW

## 2024-01-31 NOTE — PROGRESS NOTES
Coumadin Clinic Visit Note    Patient verified warfarin dose  No missed doses  No unusual bruising or bleeding  Finishing Z-Jesse today; likely reason behind elevated INR  Consistent dietary green intake  No anticipated procedures at this time  INR Supratherapeutic today at 3.1  No changes to warfarin dose today  Next appointment 5 weeks      Gil Tsang, PharmD

## 2024-02-02 DIAGNOSIS — I48.0 PAROXYSMAL ATRIAL FIBRILLATION (MULTI): Primary | ICD-10-CM

## 2024-02-06 DIAGNOSIS — E11.9 DIABETES MELLITUS WITHOUT COMPLICATION (MULTI): ICD-10-CM

## 2024-02-06 RX ORDER — BLOOD SUGAR DIAGNOSTIC
1 STRIP MISCELLANEOUS 2 TIMES DAILY
Qty: 200 STRIP | Refills: 3 | Status: SHIPPED | OUTPATIENT
Start: 2024-02-06

## 2024-02-06 RX ORDER — LANCETS
EACH MISCELLANEOUS
Qty: 200 EACH | Refills: 3 | Status: SHIPPED | OUTPATIENT
Start: 2024-02-06

## 2024-02-21 ENCOUNTER — TELEPHONE (OUTPATIENT)
Dept: PHARMACY | Facility: CLINIC | Age: 84
End: 2024-02-21

## 2024-02-21 ENCOUNTER — OFFICE VISIT (OUTPATIENT)
Dept: PRIMARY CARE | Facility: CLINIC | Age: 84
End: 2024-02-21
Payer: MEDICARE

## 2024-02-21 VITALS
RESPIRATION RATE: 18 BRPM | WEIGHT: 215 LBS | OXYGEN SATURATION: 96 % | SYSTOLIC BLOOD PRESSURE: 112 MMHG | HEIGHT: 70 IN | TEMPERATURE: 97.9 F | HEART RATE: 62 BPM | BODY MASS INDEX: 30.78 KG/M2 | DIASTOLIC BLOOD PRESSURE: 72 MMHG

## 2024-02-21 DIAGNOSIS — F01.B4 MODERATE VASCULAR DEMENTIA WITH ANXIETY (MULTI): ICD-10-CM

## 2024-02-21 DIAGNOSIS — Z79.01 ANTICOAGULATED ON COUMADIN: ICD-10-CM

## 2024-02-21 DIAGNOSIS — I10 BENIGN ESSENTIAL HYPERTENSION: ICD-10-CM

## 2024-02-21 DIAGNOSIS — L12.9 PEMPHIGOID (MULTI): ICD-10-CM

## 2024-02-21 DIAGNOSIS — D68.59: ICD-10-CM

## 2024-02-21 DIAGNOSIS — S80.01XA CONTUSION OF RIGHT KNEE, INITIAL ENCOUNTER: ICD-10-CM

## 2024-02-21 DIAGNOSIS — M25.552 BILATERAL HIP PAIN: ICD-10-CM

## 2024-02-21 DIAGNOSIS — M25.551 BILATERAL HIP PAIN: ICD-10-CM

## 2024-02-21 DIAGNOSIS — E11.9 DIABETES MELLITUS WITHOUT COMPLICATION (MULTI): Primary | ICD-10-CM

## 2024-02-21 LAB
HBA1C MFR BLD: 8.7 % (ref 4.2–6.5)
POC INR: 4.3 (ref 0.9–1.1)

## 2024-02-21 PROCEDURE — 3078F DIAST BP <80 MM HG: CPT | Performed by: FAMILY MEDICINE

## 2024-02-21 PROCEDURE — 85610 PROTHROMBIN TIME: CPT | Performed by: FAMILY MEDICINE

## 2024-02-21 PROCEDURE — 1126F AMNT PAIN NOTED NONE PRSNT: CPT | Performed by: FAMILY MEDICINE

## 2024-02-21 PROCEDURE — 99214 OFFICE O/P EST MOD 30 MIN: CPT | Performed by: FAMILY MEDICINE

## 2024-02-21 PROCEDURE — 1159F MED LIST DOCD IN RCRD: CPT | Performed by: FAMILY MEDICINE

## 2024-02-21 PROCEDURE — 3074F SYST BP LT 130 MM HG: CPT | Performed by: FAMILY MEDICINE

## 2024-02-21 PROCEDURE — 1160F RVW MEDS BY RX/DR IN RCRD: CPT | Performed by: FAMILY MEDICINE

## 2024-02-21 PROCEDURE — 1036F TOBACCO NON-USER: CPT | Performed by: FAMILY MEDICINE

## 2024-02-21 PROCEDURE — 83036 HEMOGLOBIN GLYCOSYLATED A1C: CPT | Mod: CLIA WAIVED TEST | Performed by: FAMILY MEDICINE

## 2024-02-21 ASSESSMENT — PATIENT HEALTH QUESTIONNAIRE - PHQ9
SUM OF ALL RESPONSES TO PHQ9 QUESTIONS 1 AND 2: 0
2. FEELING DOWN, DEPRESSED OR HOPELESS: NOT AT ALL
2. FEELING DOWN, DEPRESSED OR HOPELESS: NOT AT ALL
1. LITTLE INTEREST OR PLEASURE IN DOING THINGS: NOT AT ALL
SUM OF ALL RESPONSES TO PHQ9 QUESTIONS 1 AND 2: 0
1. LITTLE INTEREST OR PLEASURE IN DOING THINGS: NOT AT ALL

## 2024-02-21 ASSESSMENT — PAIN SCALES - GENERAL: PAINLEVEL: 0-NO PAIN

## 2024-02-21 NOTE — PROGRESS NOTES
Subjective   Trell Aguila is a 83 y.o. male who presents for Follow-up (Follow up visit for Coumadin level).    HPI  : Patient is an 83-year-old diabetic male who is in for recheck on his blood sugar, A1c and Coumadin INR.  Patient has many different health problems and medical concerns and he does follow-up closely with cardiology, dermatology, and  orthopedic surgery.  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  There is a question as to whether the rash is from a medication he is taking or some type of immune deficiency response.  He has several different creams and some vitamins that he is taking and hopefully the rash will improve.  He also does develop frequent yeast infections due to his diabetes and I have emphasized that he needs to get his diabetes under better control so the rashes can clear.  He also has bruising on his arms which is a lot worse than his last visit and I am sure that his Coumadin INR is prolonged greater than it should be.  We will be checking that again today.      Objective  :  ROS :10 systems were reviewed and the information is included in the HPI and no additional review of systems is indicated.    Physical Exam  Vitals and nursing note reviewed.   Constitutional:       Appearance: Normal appearance. He is obese.      Comments: Patient is alert and oriented x3.   No acute distress   HENT:      Head: Normocephalic.      Right Ear: Tympanic membrane and external ear normal.      Left Ear: Tympanic membrane and external ear normal.      Ears:      Comments: Ears are patent bilaterally and TMs are clear.     Nose: Nose normal.      Mouth/Throat:      Mouth: Mucous membranes are moist.      Pharynx: Oropharynx is clear.      Comments: Mouth is moist, tongue is midline.  No posterior pharyngeal erythema.  Eyes:      Extraocular Movements: Extraocular movements intact.      Conjunctiva/sclera: Conjunctivae normal.       Pupils: Pupils are equal, round, and reactive to light.      Comments: No visual disturbance, does have his  eyes examined once a year.   Neck:      Comments: Chronic restriction of motion cervical spine due to arthritis and degenerative disc disease.  No carotid bruits, no thyromegaly, no cervical adenopathy.    Cardiovascular:      Rate and Rhythm: Normal rate and regular rhythm.      Pulses: Normal pulses.      Comments: Patient has a history of atrial fibrillation and also coronary stents, he does follow closely with cardiology.  Patient denies chest pain and no palpitations.  Heart rhythm today is stable S1 and S2 are noted and he is not in atrial fibrillation.  Pulmonary:      Effort: Pulmonary effort is normal.      Breath sounds: Rhonchi present.      Comments: Patient has a long history of asthmatic bronchitis and does use a home nebulizer machine.  He has few scattered rhonchi to auscultation but is not short of breath and has no productive cough.  Currently his asthma is stable.  Abdominal:      General: Bowel sounds are normal.      Palpations: Abdomen is soft.      Comments: Abdomen is soft and mildly obese but nontender.  No flank tenderness.  No suprapubic pain.  Positive bowel sounds x4.  No abdominal guarding and no rebound tenderness.   Genitourinary:     Comments: Patient has a yeast infection in the perineal area.  He is using cream, topically.  This also was evaluated by dermatology.  Musculoskeletal:         General: Swelling and tenderness present. Normal range of motion.      Cervical back: Tenderness present.      Comments: Patient has lumbar spinal surgery for spinal stenosis approximately 2 years ago and he did have a L1-L5 fusion.  He is ambulating with a cane and he does still have a foot drop and has to be cautious with his balance.  He also has arthritis in both his knees and has had recent gel shots in the right knee.  Patient does have some swelling in the right inferior patellar  tendon and thinks he might of bumped his knee.  He will have to apply some ice compresses.   Skin:     General: Skin is warm.      Findings: Bruising and rash present.      Comments:   Significant bruising on both arms and hands from his prolonged INR.  He was up to 4.3 today and should be around 2.5.  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  He does follow with dermatology for this condition.   Neurological:      Mental Status: He is alert and oriented to person, place, and time. Mental status is at baseline.      Sensory: Sensory deficit present.      Motor: Weakness present.      Coordination: Coordination abnormal.      Gait: Gait abnormal.      Comments: Patient does have paresthesias in his lower extremities, he also has diabetic peripheral neuropathy, and history of foot drop from spinal stenosis.  Patient ambulates with either a cane or walker and has unsteady coordination and unsteady gait.  Also has some lower extremity muscle weakness.   Psychiatric:         Thought Content: Thought content normal.         Judgment: Judgment normal.      Comments: Patient has chronic anxiety and extreme worry about health problems.  He is OCD about every aching pain that he has and his wife is at wits end due to his behavior.  She has to wait on him hand and foot.     PLAN : Patient is an 83-year-old diabetic male was evaluated today for his diabetes and several other problems and worries.  His A1c is up to 8.7% and he will continue to use his Toujeo insulin at bedtime.  I had a long talk with the patient and his wife and he needs to use his insulin at bedtime to help control his morning sugars.  Also his INR was up to 4.3% and he is bruising significantly on his arms.  He will stop his Coumadin for 2 days and then restart it on Friday.  Patient will return in 2 weeks to have his INR rechecked.  He also had bruised his right  infrapatellar bursa area and will apply some ice packs.  I also had a long discussion with him about his current rash that has been evaluated by dermatology and it was labeled as a possible drug reaction so he has stopped his Jardiance and also the muscle relaxer for his neck.  Patient otherwise is stable we will follow-up in 2 weeks.    Problem List Items Addressed This Visit       Diabetes mellitus without complication (CMS/HCC)    Anticoagulated on Coumadin    Pemphigoid - Primary            Luis Newman DO     Patient was identified as a fall risk. Risk prevention instructions provided.

## 2024-02-21 NOTE — PATIENT INSTRUCTIONS

## 2024-02-21 NOTE — TELEPHONE ENCOUNTER
Patient had an appointment today with PCP Dr. Newman, at which he had an INR drawn - resulted at 4.3. Left voicemail for patient to call us back so we can address his supra-therapeutic INR.     Zuly Barrios, RebecaD, BCPS   2/21/2024 2:31 PM

## 2024-02-22 ENCOUNTER — DOCUMENTATION (OUTPATIENT)
Dept: PHARMACY | Facility: CLINIC | Age: 84
End: 2024-02-22
Payer: MEDICARE

## 2024-02-22 NOTE — PROGRESS NOTES
Pt had an INR taken at Dr Newman office 2/21/24 = 4.3  Dr Newman instructed pt to skip one dose 2/21 and 2/22/24 then resume  Normal schedule.  Pt has an appt with us 4/5 and one with Dr Newman on 4/4.  I explained to pt that if the clinic is monitoring his INR then he should not also go to the doctors office to get another INR monthly.  Pt stated that 2/21 his arm was discolored/black-like and Dr Newman wanted to know his INR.  I told the pt that is fine and continue Dr. Zhang.  He will call us 4/4 if Docto's office does another INR after which we will decide when  To schedule the pt next visit.    The patient stated emphatically that we wants to continue coming to our clnic.  I explained that getting INR in 2 different sites is duplication but he wants to continue doing both.

## 2024-03-04 ENCOUNTER — OFFICE VISIT (OUTPATIENT)
Dept: PRIMARY CARE | Facility: CLINIC | Age: 84
End: 2024-03-04
Payer: MEDICARE

## 2024-03-04 VITALS
BODY MASS INDEX: 30.49 KG/M2 | HEART RATE: 58 BPM | OXYGEN SATURATION: 95 % | SYSTOLIC BLOOD PRESSURE: 110 MMHG | RESPIRATION RATE: 18 BRPM | WEIGHT: 213 LBS | TEMPERATURE: 97.9 F | HEIGHT: 70 IN | DIASTOLIC BLOOD PRESSURE: 50 MMHG

## 2024-03-04 DIAGNOSIS — E11.65 UNCONTROLLED TYPE 2 DIABETES MELLITUS WITH HYPERGLYCEMIA (MULTI): ICD-10-CM

## 2024-03-04 DIAGNOSIS — S81.812D SKIN TEAR OF LEFT LOWER LEG WITHOUT COMPLICATION, SUBSEQUENT ENCOUNTER: ICD-10-CM

## 2024-03-04 DIAGNOSIS — I48.11 LONGSTANDING PERSISTENT ATRIAL FIBRILLATION (MULTI): ICD-10-CM

## 2024-03-04 DIAGNOSIS — S51.811D SKIN TEAR OF FOREARM WITHOUT COMPLICATION, RIGHT, SUBSEQUENT ENCOUNTER: Primary | ICD-10-CM

## 2024-03-04 DIAGNOSIS — Z79.899 HIGH RISK MEDICATION USE: ICD-10-CM

## 2024-03-04 PROBLEM — S81.812A SKIN TEAR OF LEFT LOWER LEG WITHOUT COMPLICATION: Status: ACTIVE | Noted: 2024-03-04

## 2024-03-04 LAB
HBA1C MFR BLD: 8.6 % (ref 4.2–6.5)
POC FINGERSTICK BLOOD GLUCOSE: 1.54 MG/DL (ref 70–100)
POC INR: 2.5 (ref 0.9–1.1)

## 2024-03-04 PROCEDURE — 83036 HEMOGLOBIN GLYCOSYLATED A1C: CPT | Mod: CLIA WAIVED TEST | Performed by: FAMILY MEDICINE

## 2024-03-04 PROCEDURE — 1036F TOBACCO NON-USER: CPT | Performed by: FAMILY MEDICINE

## 2024-03-04 PROCEDURE — 1160F RVW MEDS BY RX/DR IN RCRD: CPT | Performed by: FAMILY MEDICINE

## 2024-03-04 PROCEDURE — 82962 GLUCOSE BLOOD TEST: CPT | Performed by: FAMILY MEDICINE

## 2024-03-04 PROCEDURE — 1159F MED LIST DOCD IN RCRD: CPT | Performed by: FAMILY MEDICINE

## 2024-03-04 PROCEDURE — 99214 OFFICE O/P EST MOD 30 MIN: CPT | Performed by: FAMILY MEDICINE

## 2024-03-04 PROCEDURE — 85610 PROTHROMBIN TIME: CPT | Performed by: FAMILY MEDICINE

## 2024-03-04 PROCEDURE — 3074F SYST BP LT 130 MM HG: CPT | Performed by: FAMILY MEDICINE

## 2024-03-04 PROCEDURE — 3078F DIAST BP <80 MM HG: CPT | Performed by: FAMILY MEDICINE

## 2024-03-04 PROCEDURE — 1126F AMNT PAIN NOTED NONE PRSNT: CPT | Performed by: FAMILY MEDICINE

## 2024-03-04 ASSESSMENT — PATIENT HEALTH QUESTIONNAIRE - PHQ9
2. FEELING DOWN, DEPRESSED OR HOPELESS: NOT AT ALL
1. LITTLE INTEREST OR PLEASURE IN DOING THINGS: NOT AT ALL
SUM OF ALL RESPONSES TO PHQ9 QUESTIONS 1 AND 2: 0

## 2024-03-04 ASSESSMENT — PAIN SCALES - GENERAL: PAINLEVEL: 0-NO PAIN

## 2024-03-04 NOTE — PROGRESS NOTES
Subjective   Trell Aguila is a 83 y.o. male who presents for Follow-up (Follow  up visit for Coumadin level).    HPI   ; patient is an 83-year-old male who has multiple medical problems and was evaluated today due to skin problems and bruising on his arms and legs.  He tends to bump himself at home but then develops a skin tear on his legs or his arms and then he worries about secondary infection.  He has some open abrasions on both arms and also on both legs that will be cleansed with Betadine and a new dressing will be applied.  He also was here 2 weeks ago and his INR was prolonged and he had more bruising so he had to cut back on his Coumadin.  Also he has a skin rash from elevated blood sugars and he is using insulin at bedtime to help bring the blood sugar down and the skin rashes are improving.  He is finally starting to watch his diet and control the types of foods that he eats so his sugar can improve.  Patient is also with his wife and he is a constant worrier and he is driving her crazy with his complaints of aches and pains and problems with his arthritic joints.  He is ambulating with a cane since he had lumbar spine fusion about 2 years ago.  He also has had several gel shots in his right knee.  Patient seems to never feel well and has a multitude of chronic complaints.  I have tried to have long conversations with him so as not to worry about every little ache or  pain but he still complains and needs everything evaluated and checked.      Objective  : ROS 10 systems were reviewed and the information is included in the HPI and no additional review of systems is indicated.    Physical Exam  Vitals and nursing note reviewed.   Constitutional:       Appearance: Normal appearance. He is obese.      Comments: Patient is alert and oriented x3.   No acute distress   HENT:      Head: Normocephalic.      Right Ear: Tympanic membrane and external ear normal.      Left Ear: Tympanic membrane and external ear  normal.      Ears:      Comments: Ears are patent bilaterally and TMs are clear.     Nose: Nose normal.      Mouth/Throat:      Mouth: Mucous membranes are moist.      Pharynx: Oropharynx is clear.      Comments: Mouth is moist, tongue is midline.  No posterior pharyngeal erythema.  Eyes:      Extraocular Movements: Extraocular movements intact.      Conjunctiva/sclera: Conjunctivae normal.      Pupils: Pupils are equal, round, and reactive to light.      Comments: Patient denies any visual disturbances and does have his eyes checked yearly.   Neck:      Comments: Restricted range of motion cervical spine due to arthritis and muscle spasm.  Patient gets chronic neck spasm in the lower cervical region.  No carotid bruits, no thyromegaly, no cervical adenopathy.    Cardiovascular:      Rate and Rhythm: Normal rate and regular rhythm.      Pulses: Normal pulses.      Heart sounds: Normal heart sounds.      Comments: Patient does follow with cardiology and has atrial fibrillation and is on Coumadin.  His INR will be rechecked today since he has bruising recurring in his arms and legs.  Patient denies chest pain or palpitations.  Pulmonary:      Effort: Pulmonary effort is normal.      Breath sounds: Rhonchi present.      Comments: He denies shortness of breath, denies productive cough or wheezing.  Patient does have a history of asthmatic bronchitis and lungs have few expiratory rhonchi to auscultation.     Abdominal:      General: Bowel sounds are normal.      Palpations: Abdomen is soft.      Comments: Abdomen is soft and mildly obese but nontender.   Patient denies any abdominal guarding or rebound tenderness.  No flank pain.   Genitourinary:     Comments: Patient does get some nocturia, denies dysuria.    Does have some tenia dermatitis of the groin.   Musculoskeletal:         General: Tenderness present.      Cervical back: Normal range of motion. Tenderness present.      Comments: Patient had a spinal fusion  approximately 2 years ago and has a  chronic left foot drop and is ambulating with a cane.  He does have chronic arthritis pains in most of his joints.  He recently had gel shots in his right knee but they have not helped his pain very much.  He still ambulates with poor coordination and unsteady gait.   Skin:     General: Skin is warm and dry.      Findings: Bruising and erythema present.      Comments: Bruising and black and blue resolving on arms.  Patient has skin tears on both arms and has been picking at his skin which makes it worse.  This will be cleansed with Betadine and Bactroban dressing was applied.  He also has some skin tears and scabs on his legs from bumping his legs.  He has been to the dermatologist several times and is using topical betamethasone cream.  Patient also has tenia cruris  dermatitis in the groin.   Neurological:      Mental Status: He is alert and oriented to person, place, and time. Mental status is at baseline.      Sensory: Sensory deficit present.      Comments: Patient has paresthesias in his legs from lumbosacral disc disease he also has a left foot drop and lumbosacral radicular pain.  Gait and coordination are poor due to his lumbosacral disc disease and also the arthritis in his knees.   Psychiatric:         Thought Content: Thought content normal.         Judgment: Judgment normal.      Comments: Patient has chronic anxiety and OCD behavior about his health problems and is always worried about a new health problem.  He does have wandering thought content and sometimes relies on his wife for making judgment decisions.     PLAN :  Patient is an 83-year-old male who has multiple medical problems and was reevaluated today for several different problems and concerns.  His Coumadin level was prolonged  2 weeks ago and he was reevaluated today to check his blood sugar and his Coumadin level.  He also has some skin tears on his arms and legs and he was concerned that they might be  infected.  I did cleanse them with Betadine and applied sterile dressings.  His blood sugar today was 154 and his A1c was 8.6%.  He has been using basal  insulin at bedtime which has helped lower his sugar and he will continue to do that until his next visit.  His Coumadin INR was stable at 2.5%.   Patient also still has his rashes that he saw dermatology for and he is applying some topical steroid cream to help.  I had a long discussion with the patient and his wife and he has to quit picking at his skin and be cautious of where he is walking so he does not bump himself and tear his skin.  He does have another follow-up appointment in 2 weeks and I will reevaluate him at that time and hopefully his bruising and skin tears will heal by that date.   Patient's wife is also very frustrated with his skin problems and he needs to use his walker so that he does not bump against the walls and furniture at home.  Patient will return in 2 weeks for reevaluation.          Problem List Items Addressed This Visit    None           Luis Newman, DO

## 2024-03-05 ENCOUNTER — APPOINTMENT (OUTPATIENT)
Dept: PHARMACY | Facility: CLINIC | Age: 84
End: 2024-03-05
Payer: MEDICARE

## 2024-03-06 ENCOUNTER — APPOINTMENT (OUTPATIENT)
Dept: PHARMACY | Facility: CLINIC | Age: 84
End: 2024-03-06
Payer: MEDICARE

## 2024-03-19 ENCOUNTER — APPOINTMENT (OUTPATIENT)
Dept: PRIMARY CARE | Facility: CLINIC | Age: 84
End: 2024-03-19
Payer: MEDICARE

## 2024-03-19 PROBLEM — Z86.39 HISTORY OF ELEVATED LIPIDS: Status: ACTIVE | Noted: 2024-03-19

## 2024-03-19 PROBLEM — K13.79 DISORDER OF UVULA: Status: ACTIVE | Noted: 2024-03-19

## 2024-03-19 PROBLEM — H61.20 IMPACTED CERUMEN: Status: ACTIVE | Noted: 2024-03-19

## 2024-03-19 PROBLEM — M10.9 ARTICULAR GOUT: Status: ACTIVE | Noted: 2023-02-08

## 2024-03-19 PROBLEM — F03.90 DEMENTIA (MULTI): Status: ACTIVE | Noted: 2024-03-19

## 2024-03-19 PROBLEM — M51.36 DEGENERATIVE DISC DISEASE, LUMBAR: Status: ACTIVE | Noted: 2024-02-02

## 2024-03-19 PROBLEM — R58 ECCHYMOSIS: Status: ACTIVE | Noted: 2024-03-19

## 2024-03-19 PROBLEM — M51.369 DEGENERATIVE DISC DISEASE, LUMBAR: Status: ACTIVE | Noted: 2024-02-02

## 2024-03-19 RX ORDER — ALBUTEROL SULFATE 0.83 MG/ML
SOLUTION RESPIRATORY (INHALATION) EVERY 6 HOURS
COMMUNITY
Start: 2021-05-12 | End: 2024-04-16 | Stop reason: SDUPTHER

## 2024-03-19 RX ORDER — CLOTRIMAZOLE AND BETAMETHASONE DIPROPIONATE 10; .64 MG/G; MG/G
CREAM TOPICAL EVERY 12 HOURS
COMMUNITY
Start: 2014-08-05 | End: 2024-04-16 | Stop reason: ALTCHOICE

## 2024-03-19 RX ORDER — AMLODIPINE BESYLATE 5 MG/1
TABLET ORAL
COMMUNITY
End: 2024-04-30 | Stop reason: WASHOUT

## 2024-03-20 ENCOUNTER — TELEPHONE (OUTPATIENT)
Dept: PHARMACY | Facility: CLINIC | Age: 84
End: 2024-03-20

## 2024-03-20 ENCOUNTER — OFFICE VISIT (OUTPATIENT)
Dept: PRIMARY CARE | Facility: CLINIC | Age: 84
End: 2024-03-20
Payer: MEDICARE

## 2024-03-20 VITALS
DIASTOLIC BLOOD PRESSURE: 64 MMHG | SYSTOLIC BLOOD PRESSURE: 120 MMHG | WEIGHT: 214 LBS | TEMPERATURE: 98.1 F | HEART RATE: 81 BPM | RESPIRATION RATE: 18 BRPM | BODY MASS INDEX: 30.64 KG/M2 | OXYGEN SATURATION: 95 % | HEIGHT: 70 IN

## 2024-03-20 DIAGNOSIS — M48.061 SPINAL STENOSIS OF LUMBAR REGION WITHOUT NEUROGENIC CLAUDICATION: ICD-10-CM

## 2024-03-20 DIAGNOSIS — I48.0 PAROXYSMAL ATRIAL FIBRILLATION (MULTI): ICD-10-CM

## 2024-03-20 DIAGNOSIS — G62.9 NEUROPATHY: ICD-10-CM

## 2024-03-20 DIAGNOSIS — F01.B4 MODERATE VASCULAR DEMENTIA WITH ANXIETY (MULTI): ICD-10-CM

## 2024-03-20 DIAGNOSIS — Z79.899 HIGH RISK MEDICATION USE: ICD-10-CM

## 2024-03-20 DIAGNOSIS — F41.1 GENERALIZED ANXIETY DISORDER: ICD-10-CM

## 2024-03-20 DIAGNOSIS — E11.9 DIABETES MELLITUS WITHOUT COMPLICATION (MULTI): Primary | ICD-10-CM

## 2024-03-20 DIAGNOSIS — F45.21 HYPOCHONDRIASIS: ICD-10-CM

## 2024-03-20 DIAGNOSIS — Z79.01 ANTICOAGULATED ON COUMADIN: ICD-10-CM

## 2024-03-20 DIAGNOSIS — E66.09 CLASS 1 OBESITY DUE TO EXCESS CALORIES WITHOUT SERIOUS COMORBIDITY WITH BODY MASS INDEX (BMI) OF 30.0 TO 30.9 IN ADULT: ICD-10-CM

## 2024-03-20 LAB
HBA1C MFR BLD: 8.1 % (ref 4.2–6.5)
POC FINGERSTICK BLOOD GLUCOSE: 152 MG/DL (ref 70–100)
POC INR: 2.1 (ref 0.9–1.1)

## 2024-03-20 PROCEDURE — 1126F AMNT PAIN NOTED NONE PRSNT: CPT | Performed by: FAMILY MEDICINE

## 2024-03-20 PROCEDURE — 99214 OFFICE O/P EST MOD 30 MIN: CPT | Performed by: FAMILY MEDICINE

## 2024-03-20 PROCEDURE — 82962 GLUCOSE BLOOD TEST: CPT | Performed by: FAMILY MEDICINE

## 2024-03-20 PROCEDURE — 3074F SYST BP LT 130 MM HG: CPT | Performed by: FAMILY MEDICINE

## 2024-03-20 PROCEDURE — 3078F DIAST BP <80 MM HG: CPT | Performed by: FAMILY MEDICINE

## 2024-03-20 PROCEDURE — 1159F MED LIST DOCD IN RCRD: CPT | Performed by: FAMILY MEDICINE

## 2024-03-20 PROCEDURE — 83036 HEMOGLOBIN GLYCOSYLATED A1C: CPT | Mod: CLIA WAIVED TEST | Performed by: FAMILY MEDICINE

## 2024-03-20 PROCEDURE — 1036F TOBACCO NON-USER: CPT | Performed by: FAMILY MEDICINE

## 2024-03-20 PROCEDURE — 85610 PROTHROMBIN TIME: CPT | Performed by: FAMILY MEDICINE

## 2024-03-20 PROCEDURE — 1160F RVW MEDS BY RX/DR IN RCRD: CPT | Performed by: FAMILY MEDICINE

## 2024-03-20 ASSESSMENT — PATIENT HEALTH QUESTIONNAIRE - PHQ9
SUM OF ALL RESPONSES TO PHQ9 QUESTIONS 1 AND 2: 0
2. FEELING DOWN, DEPRESSED OR HOPELESS: NOT AT ALL
1. LITTLE INTEREST OR PLEASURE IN DOING THINGS: NOT AT ALL

## 2024-03-20 ASSESSMENT — PAIN SCALES - GENERAL: PAINLEVEL: 0-NO PAIN

## 2024-03-20 NOTE — TELEPHONE ENCOUNTER
Called patient and left voicemail. Patient had INR drawn today (resulted at 2.1), along with an appointment with Dr. Newman. Of note, we have had a few conversations with patient regarding if he wants to stay with our [facility]madin Clinic managing his INRs or if he wants Dr. Newman to manage.     Will await call back.     Zuly Barrios, RebecaD, BCPS   3/20/2024 1:22 PM

## 2024-03-20 NOTE — PROGRESS NOTES
Subjective   Trell Aguila is a 83 y.o. male who presents for Follow-up (Follow up visit for Coumadin level).    HPI :   Patient is a 83-year-old diabetic male  Who is in today for recheck on his blood sugar and A1c.  He also is on Coumadin for his atrial fibrillation and his INR will be rechecked today.  Patient has bruising all over his arms from his Coumadin.  It does appear improved from several weeks ago.  He also has chronic right knee pain and a left foot drop which does affect his ambulation.  Patient had a lumbar fusion about 2 years ago and has to ambulate with a cane so as not to lose his balance.  He also has diabetic peripheral neuropathy and takes gabapentin.  He follows closely with cardiology and also orthopedic surgery.    Objective  : ROS :10 systems were reviewed and the information is included in the HPI and no additional review of systems is indicated.    Physical Exam  Vitals and nursing note reviewed.   Constitutional:       Appearance: Normal appearance. He is obese.      Comments: Patient is alert and oriented x3.   No acute distress and is trying to watch his diet better.   HENT:      Head: Normocephalic.      Right Ear: Tympanic membrane and external ear normal.      Left Ear: Tympanic membrane and external ear normal.      Ears:      Comments: Ears are patent bilaterally and TMs are clear.     Nose: Nose normal.      Mouth/Throat:      Mouth: Mucous membranes are moist.      Pharynx: Oropharynx is clear.      Comments: Mouth is moist, tongue is midline.  No posterior pharyngeal erythema.  Eyes:      Extraocular Movements: Extraocular movements intact.      Conjunctiva/sclera: Conjunctivae normal.      Pupils: Pupils are equal, round, and reactive to light.      Comments: Patient denies visual disturbance and does try to have his eyes checked once per year.   Neck:      Comments:    Restricted range of motion cervical spine due to degenerative disc disease, and muscle spasm.  Does get  symptoms of cervical neuritis.  He had taken prednisone last month and it did help slightly but he could not tolerate the muscle relaxer.  No carotid bruits, no thyromegaly, no cervical adenopathy.    Cardiovascular:      Rate and Rhythm: Normal rate and regular rhythm.      Pulses: Normal pulses.      Heart sounds: Normal heart sounds.      Comments: Patient follows closely with cardiology and has an upcoming appointment .  Patient denies chest pain and no palpitations.  Heart rhythm is stable S1 and S2 are noted.  Patient does have paroxysmal atrial fibrillation.  Pulmonary:      Effort: Pulmonary effort is normal.      Breath sounds: Normal breath sounds.      Comments: Patient denies any coughing or wheezing.  Lungs are clear to auscultation.    Abdominal:      General: Bowel sounds are normal.      Palpations: Abdomen is soft.      Comments: Abdomen is soft and mildly obese but non tender.     No flank tenderness.  No suprapubic pain.  Positive bowel sounds x4.     Genitourinary:     Comments: Patient denies dysuria, no hematuria, denies flank pain.  Patient does have occasional nocturia.  Worse when his sugar is elevated.  Musculoskeletal:         General: Tenderness present.      Cervical back: Tenderness present.      Comments: Patient had lumbar spine surgery at least  2 years.  He is ambulating with a cane and he has to be cautious of his balance due to a chronic left foot drop.  He also has arthritis in the cervical and thoracic regions.  Patient also has arthritis in his shoulders and knees.  He has seen orthopedics for gel shots in his right knee.  No ankle edema.   Skin:     General: Skin is warm.      Findings: Rash present.      Comments:  Frequent yeast infection  when sugar is elevated.    Bruising from Coumadin.   Patient also has a rash on his upper chest from a group change.  He also has a few irritated papules on his lower back area.  He will apply some cortisone cream.   Neurological:       Mental Status: He is alert and oriented to person, place, and time. Mental status is at baseline.      Sensory: Sensory deficit present.      Motor: Weakness present.      Coordination: Coordination abnormal.      Gait: Gait abnormal.      Comments: Patient does have a left foot drop for several years even prior to his lumbar spine surgery.  Does have some peripheral diabetic neuropathy.  Patient's coordination is not very good and he must use a cane or a walker to support his balance.  He also has some muscle weakness in the upper and lower extremities.   Psychiatric:         Behavior: Behavior normal.         Thought Content: Thought content normal.         Judgment: Judgment normal.      Comments: Patient has chronic anxiety and OCD behavior concerning his health problems.  We have had a long discussion about this since everything cannot be repaired and he has to live with some of his arthritic problems.  Thought content and judgment are stable.   Occasional forgetfulness and patient's wife is concerned that he is starting with dementia.     PLAN : Patient is a 83-year-old diabetic male who is in for recheck and complete blood work and also blood sugar and A1c.  He also has his Coumadin checked at the Coumadin clinic and wanted it rechecked today to make sure that it was within normal range.  His INR was 2.1% and stable.  His blood sugar was 152 and his A1c was 8.1%..  He had been on some prednisone for cervical disc problems several weeks ago which did increase his sugar.  Patient will be notified of his other blood results in 4 days and further recommendations will be made at that time.  He was also worried that his blood pressure was a little bit on the low side today but he did not have any symptoms of dizziness or lightheadedness.  He will discuss this with cardiology since he has an appointment next week.  Patient will follow-up  as needed.    Problem List Items Addressed This Visit    Meaghan PRITCHETT  DO Paty

## 2024-03-28 DIAGNOSIS — J20.9 BRONCHITIS WITH BRONCHOSPASM: Primary | ICD-10-CM

## 2024-03-28 RX ORDER — DOXYCYCLINE 100 MG/1
100 CAPSULE ORAL 2 TIMES DAILY
Qty: 14 CAPSULE | Refills: 0 | Status: SHIPPED | OUTPATIENT
Start: 2024-03-28 | End: 2024-04-04

## 2024-03-29 ENCOUNTER — TELEPHONE (OUTPATIENT)
Dept: PHARMACY | Facility: CLINIC | Age: 84
End: 2024-03-29
Payer: MEDICARE

## 2024-03-29 NOTE — TELEPHONE ENCOUNTER
Pt calls.  He started (yesterday) doxycycline 100 mg bid x 7 days   Re-karine him to come back this coming Tuesday  
Statement Selected

## 2024-04-02 ENCOUNTER — ANTICOAGULATION - WARFARIN VISIT (OUTPATIENT)
Dept: PHARMACY | Facility: CLINIC | Age: 84
End: 2024-04-02
Payer: MEDICARE

## 2024-04-02 DIAGNOSIS — I48.0 PAROXYSMAL ATRIAL FIBRILLATION (MULTI): Primary | ICD-10-CM

## 2024-04-02 LAB
POC INR: 2.5
POC PROTHROMBIN TIME: NORMAL

## 2024-04-02 PROCEDURE — 99212 OFFICE O/P EST SF 10 MIN: CPT | Performed by: PHARMACIST

## 2024-04-02 PROCEDURE — 85610 PROTHROMBIN TIME: CPT | Mod: QW | Performed by: PHARMACIST

## 2024-04-02 NOTE — PROGRESS NOTES
Call not needed   Patient last seen by Dr. Phillips on 10/31/2016.  Patient scheduled to see Dr. Phillips on 10/30/2017 for annual follow up.   Per verbal order from Dr. Phillips, patient's warfarin 1 MG TAB PO NIGHTLY AS DIRECTED was reordered (90 tablets, 3 refills).    Rx refill authorization e-prescribed to patient's pharmacy.       Verified current dose with pt.    Pt has a rash all over his body.  He saw dermatologist and they prescribed:  clobetasol cream, ketoconazole cream, zyrtec, cera-ve itch relief and nicotinamide. He has been taking all the creams since December.  Also he had a URI and was prescribed doxycycline 100 mg bid x 7 days - started 3/28 - has only 2 days left.    No new meds or med changes since last visit.  Pt denies unusual bleed.  No upcoming procedures.  Inr = 2.5  Pt has a lot of dark bruising covering both arms - unusual for him.   Continue same dose and check again in 4 weeks.

## 2024-04-09 ENCOUNTER — APPOINTMENT (OUTPATIENT)
Dept: PHARMACY | Facility: CLINIC | Age: 84
End: 2024-04-09
Payer: MEDICARE

## 2024-04-16 ENCOUNTER — OFFICE VISIT (OUTPATIENT)
Dept: PRIMARY CARE | Facility: CLINIC | Age: 84
End: 2024-04-16
Payer: MEDICARE

## 2024-04-16 VITALS
BODY MASS INDEX: 30.64 KG/M2 | HEART RATE: 50 BPM | OXYGEN SATURATION: 96 % | SYSTOLIC BLOOD PRESSURE: 110 MMHG | DIASTOLIC BLOOD PRESSURE: 60 MMHG | WEIGHT: 214 LBS | HEIGHT: 70 IN

## 2024-04-16 DIAGNOSIS — M21.372 LEFT FOOT DROP: ICD-10-CM

## 2024-04-16 DIAGNOSIS — Z95.5 HISTORY OF CORONARY ARTERY STENT PLACEMENT: ICD-10-CM

## 2024-04-16 DIAGNOSIS — J45.40 MODERATE PERSISTENT ASTHMATIC BRONCHITIS WITHOUT COMPLICATION (HHS-HCC): ICD-10-CM

## 2024-04-16 DIAGNOSIS — M47.819 DEGENERATIVE SPINAL ARTHRITIS: ICD-10-CM

## 2024-04-16 DIAGNOSIS — I48.11 LONGSTANDING PERSISTENT ATRIAL FIBRILLATION (MULTI): ICD-10-CM

## 2024-04-16 DIAGNOSIS — E11.9 DIABETES MELLITUS WITHOUT COMPLICATION (MULTI): Primary | ICD-10-CM

## 2024-04-16 DIAGNOSIS — M17.0 PRIMARY OSTEOARTHRITIS OF BOTH KNEES: ICD-10-CM

## 2024-04-16 DIAGNOSIS — Z79.01 ANTICOAGULATED ON COUMADIN: ICD-10-CM

## 2024-04-16 LAB
HBA1C MFR BLD: 8.2 % (ref 4.2–6.5)
POC FINGERSTICK BLOOD GLUCOSE: 167 MG/DL (ref 70–100)

## 2024-04-16 PROCEDURE — 83036 HEMOGLOBIN GLYCOSYLATED A1C: CPT | Mod: CLIA WAIVED TEST | Performed by: FAMILY MEDICINE

## 2024-04-16 PROCEDURE — 99214 OFFICE O/P EST MOD 30 MIN: CPT | Performed by: FAMILY MEDICINE

## 2024-04-16 PROCEDURE — 3078F DIAST BP <80 MM HG: CPT | Performed by: FAMILY MEDICINE

## 2024-04-16 PROCEDURE — 1160F RVW MEDS BY RX/DR IN RCRD: CPT | Performed by: FAMILY MEDICINE

## 2024-04-16 PROCEDURE — 3074F SYST BP LT 130 MM HG: CPT | Performed by: FAMILY MEDICINE

## 2024-04-16 PROCEDURE — 1159F MED LIST DOCD IN RCRD: CPT | Performed by: FAMILY MEDICINE

## 2024-04-16 PROCEDURE — 82962 GLUCOSE BLOOD TEST: CPT | Performed by: FAMILY MEDICINE

## 2024-04-16 PROCEDURE — 1036F TOBACCO NON-USER: CPT | Performed by: FAMILY MEDICINE

## 2024-04-16 RX ORDER — BETAMETHASONE DIPROPIONATE 0.5 MG/G
CREAM TOPICAL
COMMUNITY
Start: 2024-03-20 | End: 2024-04-30 | Stop reason: WASHOUT

## 2024-04-16 RX ORDER — KETOCONAZOLE 20 MG/G
CREAM TOPICAL
COMMUNITY
Start: 2024-04-11

## 2024-04-16 RX ORDER — ALBUTEROL SULFATE 0.83 MG/ML
2.5 SOLUTION RESPIRATORY (INHALATION) EVERY 8 HOURS PRN
Qty: 90 ML | Refills: 1 | Status: SHIPPED | OUTPATIENT
Start: 2024-04-16

## 2024-04-16 NOTE — PROGRESS NOTES
Subjective   Trell Aguila is a 83 y.o. male who presents for Follow-up (Follow up visit /Patient goes to Coumadin clinic - 2.5 on 4/2 . Goes again on 4/30 /).    HPI  : Patient is an 83-year-old diabetic male who is in for follow-up on his diabetes and also his peripheral neuropathy.  He has a foot drop on the left side and had lumbar fusion surgery 3 years ago.  He has been through extensive physical therapy and is ambulating better but has to use a cane so he does not lose his balance.  Patient also follows with cardiology and is scheduled for a echocardiogram in June.  Patient is accompanied with his wife and he is still suffering with his bruising on his skin and he also has itchy skin on the arms and legs which he follows with dermatology.  Patient also has a history of a melanoma removed from his back probably 6 years ago.  He suffers with obsessive-compulsive disorder and worries about every little ache and pain and is now concerned about his arthritis in the right states.  I did explain to him that he cannot have cortisone in every joint that aches or pains.  He is waiting 6 months before he can have gel shots again in his knees.      Objective  : ROS : 10 systems were reviewed and the information is included in the HPI and no additional review of systems is indicated.    Physical Exam  Vitals and nursing note reviewed.   Constitutional:       Appearance: Normal appearance. He is obese.      Comments: Patient is alert and oriented x3.   No acute distress   HENT:      Head: Normocephalic.      Right Ear: Tympanic membrane and external ear normal.      Left Ear: Tympanic membrane and external ear normal.      Ears:      Comments: Ears are patent bilaterally and TMs are clear.     Nose: Nose normal.      Mouth/Throat:      Mouth: Mucous membranes are moist.      Pharynx: Oropharynx is clear.      Comments: Mouth is moist, tongue is midline.  No posterior pharyngeal erythema.  Eyes:      Extraocular Movements:  Extraocular movements intact.      Conjunctiva/sclera: Conjunctivae normal.      Pupils: Pupils are equal, round, and reactive to light.      Comments: No visual disturbance, does have his  eyes examined once a year.   Neck:      Comments:  Cervical  arthritis and spasm.   Some  cervical neuritis  with restriction of motion.  Cardiovascular:      Rate and Rhythm: Normal rate. Rhythm irregular.      Pulses: Normal pulses.      Heart sounds: Normal heart sounds.      Comments: Heart  S1 S2   with  atrial fibrillation , controlled response.  Patient denies  chest pain and follows  with Dr Coy.  Having Echo Isi .  Pulmonary:      Effort: Pulmonary effort is normal.      Breath sounds: Normal breath sounds.      Comments: Patient has a long history of asthma and is using his home nebulizer machine.  This has helped clear his chest congestion.  Patient denies any coughing or wheezing.  Lungs are clear to auscultation.    Abdominal:      General: Bowel sounds are normal.      Palpations: Abdomen is soft.      Comments: Abdomen is soft and obese and non tender to palpation.  Patient denies any flank pain and no rebound abdominal tenderness.  No masses are noted.   Genitourinary:     Comments: Patient denies dysuria, no hematuria, denies flank pain.  Nocturia  with BPH .  Musculoskeletal:         General: Tenderness present.      Cervical back: Normal range of motion. Tenderness present.      Comments: Lumbar fusion 3 years ago.  Left foot drop and ambulates with a cane.  Arthritis both knees and seeing Orthopedics for his gel shots.  Also has arthritis in both his shoulders and both his wrists.   Skin:     General: Skin is warm and dry.      Findings: Bruising and rash present.      Comments: Skin is thin and bruising all over from coumadin.  Always itching on arms .  Patient is following with dermatology and try and use some topical creams.  He also has a history of melanoma that was excised from his back at least 7 or  8 years ago.   Neurological:      Mental Status: He is alert and oriented to person, place, and time. Mental status is at baseline.      Sensory: Sensory deficit present.      Motor: Weakness present.      Coordination: Coordination abnormal.      Gait: Gait abnormal.      Comments: Left foot drop for several years.  Peripheral diabetic neuropathy.   Psychiatric:         Behavior: Behavior normal.         Thought Content: Thought content normal.         Judgment: Judgment normal.      Comments: Patient has a lot of anxiety and OCD behavior.  He is constantly worried about his health and some other health problems.  His poor wife is very distraught and anxious due to his obsession with health problems.  I did try to have a long talk today with patient and his wife to ease some of the tension between them.     PLAN : Patient is a 83-year-old diabetic male who presents today for several problems and concerns.  He is accompanied with his wife and he tends to worry about multiple health issues.  Today his blood sugar was 167 and his A1c was 8.2%.  He is trying to watch his weight and what he eats but he still likes to snack.  Patient also follows with cardiology and has an upcoming echocardiogram with Dr. Coy.  Patient also follows with orthopedics and has received gel shots in his knees and he also had a lumbar fusion 3 years ago.  He has multiple skin problems and is following with dermatology and still gets bruising from his Coumadin.  Patient is going to the Coumadin clinic.  After a long discussion today with the patient and his wife he will return in 4 weeks for recheck and hopefully some of his obsession with health issues will have improved.        Problem List Items Addressed This Visit       Diabetes mellitus without complication (Multi)    Anticoagulated on Coumadin            Luis Newman,

## 2024-04-30 ENCOUNTER — ANTICOAGULATION - WARFARIN VISIT (OUTPATIENT)
Dept: PHARMACY | Facility: CLINIC | Age: 84
End: 2024-04-30
Payer: MEDICARE

## 2024-04-30 DIAGNOSIS — I48.0 PAROXYSMAL ATRIAL FIBRILLATION (MULTI): Primary | ICD-10-CM

## 2024-04-30 LAB
POC INR: 4.5
POC PROTHROMBIN TIME: NORMAL

## 2024-04-30 PROCEDURE — 85610 PROTHROMBIN TIME: CPT | Mod: QW

## 2024-04-30 PROCEDURE — 99212 OFFICE O/P EST SF 10 MIN: CPT

## 2024-04-30 RX ORDER — CETIRIZINE HYDROCHLORIDE 10 MG/1
10 TABLET ORAL DAILY
COMMUNITY

## 2024-04-30 NOTE — PROGRESS NOTES
No bleeding. Pt has a lot of large bruises on hands and arms.  Medications and doses verified.   Pt started taking Prevagen daily.  No scheduled procedures at this time.  INR=4.5   Diet has been the same/consistent.  Plan: Hold 2 doses then continue same weekly dose.  Follow up INR check in 1 week.

## 2024-05-07 ENCOUNTER — ANTICOAGULATION - WARFARIN VISIT (OUTPATIENT)
Dept: PHARMACY | Facility: CLINIC | Age: 84
End: 2024-05-07
Payer: MEDICARE

## 2024-05-07 DIAGNOSIS — I48.0 PAROXYSMAL ATRIAL FIBRILLATION (MULTI): Primary | ICD-10-CM

## 2024-05-07 LAB
POC INR: 2.8
POC PROTHROMBIN TIME: NORMAL

## 2024-05-07 PROCEDURE — 85610 PROTHROMBIN TIME: CPT | Mod: QW

## 2024-05-07 PROCEDURE — 99212 OFFICE O/P EST SF 10 MIN: CPT

## 2024-05-07 NOTE — PROGRESS NOTES
Coumadin Clinic Visit Note    Patient verified warfarin dose  No missed doses  Bruising is slightly better than last week  No changes to medications  Consistent dietary green intake  No anticipated procedures at this time  INR Therapeutic today at 2.8  Will decrease dose slightly to 5 mg on Sun and Thur, 2.5 mg all other days  Next appointment 2 weeks    Denisse Rucker, Pharm D

## 2024-05-13 ENCOUNTER — TELEPHONE (OUTPATIENT)
Dept: PHARMACY | Facility: CLINIC | Age: 84
End: 2024-05-13
Payer: MEDICARE

## 2024-05-13 NOTE — TELEPHONE ENCOUNTER
Patient's wife called stating that patient is having some dark red patches on his left arm - she stated he may be itching this in his sleep, and he is putting a steroid cream on this. There is no bleeding associated with this, no bruising. Last INR on 5/7/24 was 2.8 and dose slightly decreased. Next POC INR 5/22. At this time, since there have been no changes to meds, diet, patient status, and no bleeding since last INR, keeping 5/22/24 POC INR appointment appropriate at this time. Encouraged patient's wife to contact primary care physician if rash symptoms are getting worse, and to call us back if any bleeding or worsening symptoms occur, as we are happy to check INR if needed.     Zuly Barrios, PharmD, BCPS   5/13/2024 11:33 AM

## 2024-05-21 ENCOUNTER — OFFICE VISIT (OUTPATIENT)
Dept: PRIMARY CARE | Facility: CLINIC | Age: 84
End: 2024-05-21
Payer: MEDICARE

## 2024-05-21 VITALS
WEIGHT: 215 LBS | TEMPERATURE: 98.1 F | DIASTOLIC BLOOD PRESSURE: 73 MMHG | OXYGEN SATURATION: 93 % | SYSTOLIC BLOOD PRESSURE: 122 MMHG | BODY MASS INDEX: 33.74 KG/M2 | HEIGHT: 67 IN | RESPIRATION RATE: 18 BRPM | HEART RATE: 71 BPM

## 2024-05-21 DIAGNOSIS — M47.819 DEGENERATIVE SPINAL ARTHRITIS: ICD-10-CM

## 2024-05-21 DIAGNOSIS — I10 PRIMARY HYPERTENSION: ICD-10-CM

## 2024-05-21 DIAGNOSIS — E11.9 DIABETES MELLITUS WITHOUT COMPLICATION (MULTI): ICD-10-CM

## 2024-05-21 DIAGNOSIS — J45.40 MODERATE PERSISTENT ASTHMATIC BRONCHITIS WITHOUT COMPLICATION (HHS-HCC): ICD-10-CM

## 2024-05-21 DIAGNOSIS — E66.09 CLASS 1 OBESITY DUE TO EXCESS CALORIES WITHOUT SERIOUS COMORBIDITY WITH BODY MASS INDEX (BMI) OF 33.0 TO 33.9 IN ADULT: ICD-10-CM

## 2024-05-21 DIAGNOSIS — I48.11 LONGSTANDING PERSISTENT ATRIAL FIBRILLATION (MULTI): ICD-10-CM

## 2024-05-21 DIAGNOSIS — M54.17 LUMBOSACRAL RADICULITIS: ICD-10-CM

## 2024-05-21 DIAGNOSIS — E11.42 DIABETIC POLYNEUROPATHY ASSOCIATED WITH TYPE 2 DIABETES MELLITUS (MULTI): Primary | ICD-10-CM

## 2024-05-21 DIAGNOSIS — I25.118 CORONARY ARTERY DISEASE OF NATIVE ARTERY OF NATIVE HEART WITH STABLE ANGINA PECTORIS (CMS-HCC): ICD-10-CM

## 2024-05-21 DIAGNOSIS — F01.B4 MODERATE VASCULAR DEMENTIA WITH ANXIETY (MULTI): ICD-10-CM

## 2024-05-21 DIAGNOSIS — M21.372 LEFT FOOT DROP: ICD-10-CM

## 2024-05-21 DIAGNOSIS — Z98.890 STATUS POST LUMBAR SPINE OPERATIVE PROCEDURE FOR DECOMPRESSION OF SPINAL CORD: ICD-10-CM

## 2024-05-21 LAB
HBA1C MFR BLD: 7.9 % (ref 4.2–6.5)
POC FINGERSTICK BLOOD GLUCOSE: 184 MG/DL (ref 70–100)

## 2024-05-21 PROCEDURE — 1159F MED LIST DOCD IN RCRD: CPT | Performed by: FAMILY MEDICINE

## 2024-05-21 PROCEDURE — 3078F DIAST BP <80 MM HG: CPT | Performed by: FAMILY MEDICINE

## 2024-05-21 PROCEDURE — 1126F AMNT PAIN NOTED NONE PRSNT: CPT | Performed by: FAMILY MEDICINE

## 2024-05-21 PROCEDURE — 83036 HEMOGLOBIN GLYCOSYLATED A1C: CPT | Mod: CLIA WAIVED TEST | Performed by: FAMILY MEDICINE

## 2024-05-21 PROCEDURE — 3074F SYST BP LT 130 MM HG: CPT | Performed by: FAMILY MEDICINE

## 2024-05-21 PROCEDURE — 82962 GLUCOSE BLOOD TEST: CPT | Performed by: FAMILY MEDICINE

## 2024-05-21 PROCEDURE — 99214 OFFICE O/P EST MOD 30 MIN: CPT | Performed by: FAMILY MEDICINE

## 2024-05-21 PROCEDURE — 1036F TOBACCO NON-USER: CPT | Performed by: FAMILY MEDICINE

## 2024-05-21 PROCEDURE — 1160F RVW MEDS BY RX/DR IN RCRD: CPT | Performed by: FAMILY MEDICINE

## 2024-05-21 ASSESSMENT — PAIN SCALES - GENERAL: PAINLEVEL: 0-NO PAIN

## 2024-05-21 NOTE — PROGRESS NOTES
Subjective   Trell Aguila is a 83 y.o. male who presents for Diabetes and Hypertension.    HPI   : Patient is an 83-year-old diabetic male who also suffers with OCD behavior and is being evaluated today for his hypertension, diabetes and concerns about his breathing and asthma.  Apparently the insurance does not want to cover his albuterol for the nebulizer machine so he is going to have to purchase it with good Rx.  He also wants his blood sugar and A1c rechecked today and he has an appointment next month with cardiology.  He is going to the Coumadin clinic now to evaluate his Coumadin level and INR.      Objective   : ROS : 10 systems were reviewed and the information is included in the HPI and no additional review of systems is indicated.    Physical Exam  Vitals and nursing note reviewed.   Constitutional:       Appearance: Normal appearance. He is obese.      Comments: Patient is alert and oriented x3.   No acute distress   HENT:      Head: Normocephalic.      Right Ear: Tympanic membrane and external ear normal.      Left Ear: Tympanic membrane and external ear normal.      Ears:      Comments: Ears are patent bilaterally and TMs are clear.     Nose: Nose normal.      Mouth/Throat:      Mouth: Mucous membranes are moist.      Pharynx: Oropharynx is clear.      Comments: Mouth is moist, tongue is midline.  No posterior pharyngeal erythema.  Eyes:      Extraocular Movements: Extraocular movements intact.      Conjunctiva/sclera: Conjunctivae normal.      Pupils: Pupils are equal, round, and reactive to light.      Comments: No visual disturbance, does have his eyes examined once a year.   Neck:      Comments: Restricted range of motion  cervical spine  due  to arthritis and muscle spasm.  No carotid bruits, no thyromegaly, no cervical adenopathy.    Cardiovascular:      Rate and Rhythm: Normal rate. Rhythm irregular.      Pulses: Normal pulses.      Heart sounds: Normal heart sounds.      Comments: Patient has  his appointment with cardiology next month.  He will be having another echocardiogram.  Follows  with cardiology for  Atrial fibrillation.    Heart irregular  with atrial fibrillation  S1 S2 noted.  Patient denies any chest pain.  Pulmonary:      Effort: Pulmonary effort is normal.      Breath sounds: Wheezing present.      Comments: Patient has a history of asthmatic bronchitis and does use a home nebulizer machine.  Insurance does not want to pay for his albuterol so he will have to purchase it with good Rx.,  Lungs have few expiratory wheezes to auscultation but patient is not short of breath.  Abdominal:      General: Bowel sounds are normal.      Palpations: Abdomen is soft.      Comments: Abdomen is soft and mildly obese and  non tender. No flank tenderness.  No suprapubic pain.  Positive bowel sounds x4.  Patient continues to try to watch his diet and lose weight.     Genitourinary:     Comments: Patient denies dysuria, no hematuria, no nocturia, denies flank pain.  Musculoskeletal:         General: Tenderness present. Normal range of motion.      Cervical back: Normal range of motion. Tenderness present.      Comments: Patient had lumbar spinal surgery approximately 2 years ago for laminectomy and fusion.  He does have an enlarged chronic foot drop.  He does ambulate with a wheeled walker and has to be cautious not to lose his balance.  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  Does follow with orthopedics and has had cortisone shots in the right knee.   Skin:     General: Skin is warm and dry.      Findings: Bruising and rash present.      Comments: Patient has been following with dermatology for a nonspecific dermatitis skin rash.  He does have topical cream that he applies.  He also has bruising on his arms from his Coumadin.   Neurological:      General: No focal deficit present.      Mental Status: He is alert and oriented to person, place, and time. Mental status is at  baseline.      Sensory: Sensory deficit present.      Motor: Weakness present.      Coordination: Coordination abnormal.      Gait: Gait abnormal.      Comments: Left leg weakness and left foot drop.  Patient does have paresthesias in his lower extremities, from chronic lumbosacral disc disease.  He does have some weakness in his lower extremities and must use a walker at all times.  He also has abnormal coordination and poor gait due to chronic arthritis.   Psychiatric:         Thought Content: Thought content normal.         Judgment: Judgment normal.      Comments: Patient has chronic OCD behavior and is always worried about some type of illness or ailment.  He never feels  normal and has chronic issues with his arthritis and  problems ambulating due to lumbosacral disc disease and knee arthritis.  Patient is starting to get some senile dementia and forgetfulness as his wife can attest to.  I had a long discussion with him and he does need to quit worrying about every little ache and pain and also his chronic skin rash that comes and goes.     PLAN : Patient is an 83-year-old diabetic male who was evaluated today for several problems and concerns.  The insurance does not want to pay for his albuterol so he will have to purchase it with good Rx.  The medical assistant did review this with his wife.  His blood sugar today was 184 and his A1c is still too high at 7.9%.  He does not follow strict diet like he should and tends to eat late at night.  Patient also follows with orthopedic surgery and he has a cardiology appointment next month for an echocardiogram.  Patient also follows closely with dermatology due to frequent skin rashes.  We had a long discussion today about all his health issues and he will follow-up in 2 to 3 months or sooner as needed.    Problem List Items Addressed This Visit       Diabetes mellitus without complication (Multi)    Relevant Orders    POCT fingerstick glucose manually resulted     POCT Glycosylated Hemoglobin (HGB A1C) docked device            Luis Newman DO

## 2024-05-22 ENCOUNTER — ANTICOAGULATION - WARFARIN VISIT (OUTPATIENT)
Dept: PHARMACY | Facility: CLINIC | Age: 84
End: 2024-05-22
Payer: MEDICARE

## 2024-05-22 DIAGNOSIS — I48.0 PAROXYSMAL ATRIAL FIBRILLATION (MULTI): Primary | ICD-10-CM

## 2024-05-22 LAB
POC INR: 4
POC PROTHROMBIN TIME: NORMAL

## 2024-05-22 PROCEDURE — 85610 PROTHROMBIN TIME: CPT | Mod: QW

## 2024-05-22 PROCEDURE — 99212 OFFICE O/P EST SF 10 MIN: CPT

## 2024-05-22 NOTE — PROGRESS NOTES
Coumadin Clinic Visit Note    Patient verified warfarin dose  No missed doses  Large bruise on left forearm with smaller bruising on right forearm that he is unsure how he got  Got a cortisone shot yesterday for his neck  Consistent dietary green intake; does not normally eat salad or vitamin K containing vegetables but may start eating them once a week since his INR has been high a couple times recently.  No anticipated procedures at this time  INR Supratherapeutic today at 4.0  Instructed patient to not take his warfarin dose this evening, 5/22/24, and only take 1/2 tablet (2.5mg) tomorrow, 5/23/24.   Next appointment 2 weeks      Chad Mcpherson, RebecaD

## 2024-05-23 ENCOUNTER — TELEPHONE (OUTPATIENT)
Dept: PHARMACY | Facility: CLINIC | Age: 84
End: 2024-05-23
Payer: MEDICARE

## 2024-05-23 NOTE — TELEPHONE ENCOUNTER
Pt wife, Natalie calls.  They forgot to tell us that the pt has been taking Prevagen for about 1 mo now.  Anjelica is wondering if it is the prevagen that caused the inr to increase.  Explained that it is not very well known how it can affect inr.  May cause inr to increase or decrease.  Anjelica says that she thinks it is helping pt.  Will just need to monitor inr more closely

## 2024-05-30 DIAGNOSIS — I10 PRIMARY HYPERTENSION: ICD-10-CM

## 2024-05-30 DIAGNOSIS — N13.8 BENIGN PROSTATIC HYPERPLASIA WITH URINARY OBSTRUCTION: ICD-10-CM

## 2024-05-30 DIAGNOSIS — N40.1 BENIGN PROSTATIC HYPERPLASIA WITH URINARY OBSTRUCTION: ICD-10-CM

## 2024-05-30 RX ORDER — METOPROLOL SUCCINATE 50 MG/1
50 TABLET, EXTENDED RELEASE ORAL
Qty: 90 TABLET | Refills: 3 | Status: SHIPPED | OUTPATIENT
Start: 2024-05-30 | End: 2025-05-30

## 2024-05-30 RX ORDER — METOPROLOL SUCCINATE 100 MG/1
100 TABLET, EXTENDED RELEASE ORAL NIGHTLY
Qty: 90 TABLET | Refills: 3 | Status: SHIPPED | OUTPATIENT
Start: 2024-05-30 | End: 2025-05-30

## 2024-05-30 RX ORDER — TAMSULOSIN HYDROCHLORIDE 0.4 MG/1
0.4 CAPSULE ORAL NIGHTLY
Qty: 90 CAPSULE | Refills: 3 | Status: SHIPPED | OUTPATIENT
Start: 2024-05-30

## 2024-06-04 ENCOUNTER — ANTICOAGULATION - WARFARIN VISIT (OUTPATIENT)
Dept: PHARMACY | Facility: CLINIC | Age: 84
End: 2024-06-04
Payer: MEDICARE

## 2024-06-04 DIAGNOSIS — I48.0 PAROXYSMAL ATRIAL FIBRILLATION (MULTI): Primary | ICD-10-CM

## 2024-06-04 LAB
POC INR: 3
POC PROTHROMBIN TIME: NORMAL

## 2024-06-04 PROCEDURE — 85610 PROTHROMBIN TIME: CPT | Mod: QW

## 2024-06-04 PROCEDURE — 99212 OFFICE O/P EST SF 10 MIN: CPT

## 2024-06-04 NOTE — PROGRESS NOTES
No bleeding or unusual bruising.  Bruising on arms is fading.  Medications and doses verified.  No scheduled procedures at this time.  INR=3.0   Plan: Continue same weekly dose.  Follow up INR check in 3 weeks.

## 2024-06-11 PROBLEM — I50.9 HEART FAILURE, UNSPECIFIED (MULTI): Status: RESOLVED | Noted: 2021-04-02 | Resolved: 2024-06-11

## 2024-06-11 PROBLEM — Z86.39 HISTORY OF ELEVATED LIPIDS: Status: RESOLVED | Noted: 2024-03-19 | Resolved: 2024-06-11

## 2024-06-18 ENCOUNTER — APPOINTMENT (OUTPATIENT)
Dept: PRIMARY CARE | Facility: CLINIC | Age: 84
End: 2024-06-18
Payer: MEDICARE

## 2024-06-18 VITALS
BODY MASS INDEX: 33.74 KG/M2 | RESPIRATION RATE: 16 BRPM | DIASTOLIC BLOOD PRESSURE: 70 MMHG | SYSTOLIC BLOOD PRESSURE: 110 MMHG | OXYGEN SATURATION: 94 % | WEIGHT: 215 LBS | HEART RATE: 79 BPM | TEMPERATURE: 97.9 F | HEIGHT: 67 IN

## 2024-06-18 DIAGNOSIS — R53.81 MALAISE AND FATIGUE: ICD-10-CM

## 2024-06-18 DIAGNOSIS — R53.83 MALAISE AND FATIGUE: ICD-10-CM

## 2024-06-18 DIAGNOSIS — F01.B4 MODERATE VASCULAR DEMENTIA WITH ANXIETY (MULTI): ICD-10-CM

## 2024-06-18 DIAGNOSIS — I95.0 IDIOPATHIC HYPOTENSION: ICD-10-CM

## 2024-06-18 DIAGNOSIS — G62.9 NEUROPATHY: ICD-10-CM

## 2024-06-18 DIAGNOSIS — I25.118 CORONARY ARTERY DISEASE OF NATIVE ARTERY OF NATIVE HEART WITH STABLE ANGINA PECTORIS (CMS-HCC): ICD-10-CM

## 2024-06-18 DIAGNOSIS — I48.11 LONGSTANDING PERSISTENT ATRIAL FIBRILLATION (MULTI): ICD-10-CM

## 2024-06-18 DIAGNOSIS — E11.9 DIABETES MELLITUS WITHOUT COMPLICATION (MULTI): Primary | ICD-10-CM

## 2024-06-18 DIAGNOSIS — E78.2 MIXED HYPERLIPIDEMIA: ICD-10-CM

## 2024-06-18 DIAGNOSIS — I10 PRIMARY HYPERTENSION: ICD-10-CM

## 2024-06-18 DIAGNOSIS — J45.40 MODERATE PERSISTENT ASTHMA WITHOUT COMPLICATION (HHS-HCC): ICD-10-CM

## 2024-06-18 LAB
ALBUMIN SERPL BCP-MCNC: 4.2 G/DL (ref 3.4–5)
ALP SERPL-CCNC: 49 U/L (ref 33–136)
ALT SERPL W P-5'-P-CCNC: 14 U/L (ref 10–52)
ANION GAP SERPL CALC-SCNC: 14 MMOL/L (ref 10–20)
AST SERPL W P-5'-P-CCNC: 16 U/L (ref 9–39)
BILIRUB SERPL-MCNC: 0.8 MG/DL (ref 0–1.2)
BUN SERPL-MCNC: 24 MG/DL (ref 6–23)
CALCIUM SERPL-MCNC: 9.5 MG/DL (ref 8.6–10.6)
CHLORIDE SERPL-SCNC: 104 MMOL/L (ref 98–107)
CHOLEST SERPL-MCNC: 107 MG/DL (ref 0–199)
CHOLESTEROL/HDL RATIO: 3.1
CO2 SERPL-SCNC: 27 MMOL/L (ref 21–32)
CREAT SERPL-MCNC: 1.35 MG/DL (ref 0.5–1.3)
EGFRCR SERPLBLD CKD-EPI 2021: 52 ML/MIN/1.73M*2
ERYTHROCYTE [DISTWIDTH] IN BLOOD BY AUTOMATED COUNT: 14.6 % (ref 11.5–14.5)
GLUCOSE SERPL-MCNC: 165 MG/DL (ref 74–99)
HBA1C MFR BLD: 8.2 % (ref 4.2–6.5)
HCT VFR BLD AUTO: 42.5 % (ref 41–52)
HDLC SERPL-MCNC: 34 MG/DL
HGB BLD-MCNC: 13.2 G/DL (ref 13.5–17.5)
LDLC SERPL CALC-MCNC: 43 MG/DL
MCH RBC QN AUTO: 28 PG (ref 26–34)
MCHC RBC AUTO-ENTMCNC: 31.1 G/DL (ref 32–36)
MCV RBC AUTO: 90 FL (ref 80–100)
NON HDL CHOLESTEROL: 73 MG/DL (ref 0–149)
NRBC BLD-RTO: 0 /100 WBCS (ref 0–0)
PLATELET # BLD AUTO: 158 X10*3/UL (ref 150–450)
POC FINGERSTICK BLOOD GLUCOSE: 163 MG/DL (ref 70–100)
POTASSIUM SERPL-SCNC: 4.2 MMOL/L (ref 3.5–5.3)
PROT SERPL-MCNC: 6.6 G/DL (ref 6.4–8.2)
RBC # BLD AUTO: 4.72 X10*6/UL (ref 4.5–5.9)
SODIUM SERPL-SCNC: 141 MMOL/L (ref 136–145)
TRIGL SERPL-MCNC: 151 MG/DL (ref 0–149)
TSH SERPL-ACNC: 1.95 MIU/L (ref 0.44–3.98)
VLDL: 30 MG/DL (ref 0–40)
WBC # BLD AUTO: 7.5 X10*3/UL (ref 4.4–11.3)

## 2024-06-18 PROCEDURE — 3074F SYST BP LT 130 MM HG: CPT | Performed by: FAMILY MEDICINE

## 2024-06-18 PROCEDURE — 83036 HEMOGLOBIN GLYCOSYLATED A1C: CPT | Mod: CLIA WAIVED TEST | Performed by: FAMILY MEDICINE

## 2024-06-18 PROCEDURE — 1159F MED LIST DOCD IN RCRD: CPT | Performed by: FAMILY MEDICINE

## 2024-06-18 PROCEDURE — 80053 COMPREHEN METABOLIC PANEL: CPT

## 2024-06-18 PROCEDURE — 3078F DIAST BP <80 MM HG: CPT | Performed by: FAMILY MEDICINE

## 2024-06-18 PROCEDURE — 36415 COLL VENOUS BLD VENIPUNCTURE: CPT

## 2024-06-18 PROCEDURE — 1036F TOBACCO NON-USER: CPT | Performed by: FAMILY MEDICINE

## 2024-06-18 PROCEDURE — 82962 GLUCOSE BLOOD TEST: CPT | Performed by: FAMILY MEDICINE

## 2024-06-18 PROCEDURE — 99214 OFFICE O/P EST MOD 30 MIN: CPT | Performed by: FAMILY MEDICINE

## 2024-06-18 PROCEDURE — 84443 ASSAY THYROID STIM HORMONE: CPT

## 2024-06-18 PROCEDURE — 85027 COMPLETE CBC AUTOMATED: CPT

## 2024-06-18 PROCEDURE — 1126F AMNT PAIN NOTED NONE PRSNT: CPT | Performed by: FAMILY MEDICINE

## 2024-06-18 PROCEDURE — 80061 LIPID PANEL: CPT

## 2024-06-18 PROCEDURE — 1160F RVW MEDS BY RX/DR IN RCRD: CPT | Performed by: FAMILY MEDICINE

## 2024-06-18 RX ORDER — GABAPENTIN 600 MG/1
600 TABLET ORAL 3 TIMES DAILY
Qty: 270 TABLET | Refills: 3 | Status: SHIPPED | OUTPATIENT
Start: 2024-06-18 | End: 2025-06-18

## 2024-06-18 ASSESSMENT — PAIN SCALES - GENERAL: PAINLEVEL: 0-NO PAIN

## 2024-06-18 NOTE — PROGRESS NOTES
Subjective   Trell Aguila is a 83 y.o. male who presents for Follow-up (Follow up visit for diabetes).    HPI  : Patient is an 83-year-old diabetic male who is in today for recheck on his blood sugar and A1c.  Patient also has severe anxiety and hypochondritis this and is always very worried about all his health problems.  This occurs every 2 to 3 months and he insists on having his blood sugar and A1c rechecked.  He also has a lot of arthritic problems and has lumbar fusion approximately 2 and half years ago and still has to ambulate with a walker due to a left foot drop and severe arthritis in his right knee.  He has had cortisone shots in his right knee and will probably have the gel shots again in the fall.  Patient goes to the Coumadin clinic for recheck on his blood thinner he still has quite a bit of bruising and ecchymosis on both his arms.  He also has a skin tear on his lower right anterior shin where he bumped his leg against a piece of furniture.    Objective  : ROS :10 systems were reviewed and the information is included in the HPI and no additional review of systems is indicated.    Physical Exam  Vitals and nursing note reviewed.   Constitutional:       Appearance: Normal appearance. He is obese.      Comments: Patient is alert and oriented x3.   No acute distress   HENT:      Head: Normocephalic.      Right Ear: Tympanic membrane and external ear normal.      Left Ear: Tympanic membrane and external ear normal.      Ears:      Comments: Ears are patent bilaterally and TMs are clear.     Nose: Nose normal.      Mouth/Throat:      Mouth: Mucous membranes are moist.      Pharynx: Oropharynx is clear.      Comments: Mouth is moist, tongue is midline.  No posterior pharyngeal erythema.  Eyes:      Extraocular Movements: Extraocular movements intact.      Conjunctiva/sclera: Conjunctivae normal.      Pupils: Pupils are equal, round, and reactive to light.      Comments: No visual disturbance, does have  his  eyes examined once a year.   Neck:      Comments: restricted  range  of  motion  cervical  spine from arthritis.  No carotid bruits, no thyromegaly, no cervical adenopathy.    Cardiovascular:      Rate and Rhythm: Normal rate. Rhythm irregular.      Pulses: Normal pulses.      Heart sounds: Normal heart sounds.      Comments:  Follows  with   Cardiology.  History  of  atrial fibrillation.  ECHO  cardiogram next week.    Pulmonary:      Effort: Pulmonary effort is normal.      Breath sounds: Rhonchi present.      Comments: Few  scattered  rhonchi and no wheezing.    Patient has chronic  mucous  URI.  Abdominal:      General: Bowel sounds are normal.      Palpations: Abdomen is soft.      Comments: Abdomen is soft and obese  , and  non tender.  No flank tenderness.  No suprapubic pain.  Positive bowel sounds .  No abdominal guarding and no rebound tenderness.   Genitourinary:     Comments: Patient denies dysuria, no hematuria, denies flank pain.  Patient does have nocturia.  Musculoskeletal:         General: Swelling and tenderness present. Normal range of motion.      Cervical back: Normal range of motion.      Comments: Left foot drop.    Arthritis  both hands and fingers.   Still problems  right knee.  Stable  post lumbar  laminectomy.   Skin:     General: Skin is warm.      Findings: Bruising (Bruising both arms from Coumadin.) present.      Comments: Patient has bruising on both arms from his Coumadin.    Patient also has a skin tear on his right anterior shin from bumping the furniture.  This was cleansed with Betadine and a Bactroban dressing was applied   Neurological:      Mental Status: He is alert and oriented to person, place, and time. Mental status is at baseline.      Sensory: Sensory deficit present.      Motor: Weakness present.      Coordination: Coordination abnormal.      Gait: Gait abnormal.      Deep Tendon Reflexes: Reflexes abnormal.      Comments: Patient has a left foot drop and  peripheral diabetic neuropathy.  Previous lumbosacral disc disease and sciatica which has improved since his lumbar fusion.  Unsteady gait and must use a walker.  Weakness in the lower extremities.  Abnormal reflexes both lower extremities.   Psychiatric:         Behavior: Behavior normal.         Thought Content: Thought content normal.         Judgment: Judgment normal.      Comments: Patient has  anxious  mood and affect.  Chronic hypochondriasis and worries about all his health issues.   Mild  vascular dementia. .    Behavior is stable.     PLAN : Patient is a 83-year-old diabetic male who was evaluated today for diabetes, hypotension and also several other problems and concerns.  He did have a  skin tear on his right lower anterior shin and this was cleansed with Betadine and treated with a Bactroban dressing .  His blood sugar today was 163 and his A1c was 8.2%.  He also will be notified of his other blood results in 3 days when available.  His blood pressure is still been running a little bit low so I will decrease his Cozaar to 50 mg daily.  We will see cardiology finds his blood pressure next week.  He does have an appointment with cardiology next week for his echocardiogram and he is somewhat worried about those results today.  I had a long discussion with him and he needs to try to not worry about his health problems on a daily basis.  He will be notified of his blood results and further recommendations will be made at that time.  He does still go to the Coumadin clinic for recheck on his Coumadin and INR and he will follow-up in this office in 2 to 3 months, pending his blood work results.    Problem List Items Addressed This Visit       Diabetes mellitus without complication (Multi)    Relevant Orders    POCT fingerstick glucose manually resulted    POCT Glycosylated Hemoglobin (HGB A1C) docked device    Hyperlipidemia    Relevant Orders    POCT fingerstick glucose manually resulted    POCT Glycosylated  Hemoglobin (HGB A1C) docked device    CBC    Comprehensive Metabolic Panel    Lipid Panel    Thyroid Stimulating Hormone    Hypertension    Relevant Orders    POCT fingerstick glucose manually resulted    POCT Glycosylated Hemoglobin (HGB A1C) docked device    CBC    Comprehensive Metabolic Panel    Lipid Panel    Thyroid Stimulating Hormone     Other Visit Diagnoses       Malaise and fatigue        Relevant Orders    POCT fingerstick glucose manually resulted    POCT Glycosylated Hemoglobin (HGB A1C) docked device    CBC    Comprehensive Metabolic Panel    Lipid Panel    Thyroid Stimulating Hormone                 Luis Newman DO

## 2024-06-20 ENCOUNTER — ANTICOAGULATION - WARFARIN VISIT (OUTPATIENT)
Dept: PHARMACY | Facility: CLINIC | Age: 84
End: 2024-06-20
Payer: MEDICARE

## 2024-06-20 DIAGNOSIS — I10 PRIMARY HYPERTENSION: ICD-10-CM

## 2024-06-20 DIAGNOSIS — I48.0 PAROXYSMAL ATRIAL FIBRILLATION (MULTI): Primary | ICD-10-CM

## 2024-06-20 LAB
POC INR: 2.7
POC PROTHROMBIN TIME: NORMAL

## 2024-06-20 PROCEDURE — 85610 PROTHROMBIN TIME: CPT | Mod: QW

## 2024-06-20 PROCEDURE — 99212 OFFICE O/P EST SF 10 MIN: CPT

## 2024-06-20 NOTE — PROGRESS NOTES
No bleeding or unusual bruising.  Medications and doses verified.  No scheduled procedures at this time.  INR=2.7   Plan: Continue same weekly dose.  Follow up INR check in 3 weeks (pt requests 4 weeks due to other appointments).

## 2024-06-25 ENCOUNTER — APPOINTMENT (OUTPATIENT)
Dept: CARDIOLOGY | Facility: CLINIC | Age: 84
End: 2024-06-25
Payer: MEDICARE

## 2024-06-25 ENCOUNTER — HOSPITAL ENCOUNTER (OUTPATIENT)
Dept: CARDIOLOGY | Facility: CLINIC | Age: 84
Discharge: HOME | End: 2024-06-25
Payer: MEDICARE

## 2024-06-25 VITALS
BODY MASS INDEX: 33.74 KG/M2 | HEART RATE: 75 BPM | WEIGHT: 215 LBS | SYSTOLIC BLOOD PRESSURE: 120 MMHG | DIASTOLIC BLOOD PRESSURE: 70 MMHG | OXYGEN SATURATION: 95 % | HEIGHT: 67 IN

## 2024-06-25 VITALS
WEIGHT: 215 LBS | DIASTOLIC BLOOD PRESSURE: 70 MMHG | HEIGHT: 67 IN | BODY MASS INDEX: 33.74 KG/M2 | SYSTOLIC BLOOD PRESSURE: 110 MMHG

## 2024-06-25 DIAGNOSIS — I48.91 UNSPECIFIED ATRIAL FIBRILLATION (MULTI): ICD-10-CM

## 2024-06-25 DIAGNOSIS — I34.0 MODERATE MITRAL REGURGITATION: ICD-10-CM

## 2024-06-25 DIAGNOSIS — I35.0 MILD AORTIC STENOSIS: ICD-10-CM

## 2024-06-25 DIAGNOSIS — I48.0 PAROXYSMAL ATRIAL FIBRILLATION (MULTI): ICD-10-CM

## 2024-06-25 DIAGNOSIS — Z95.5 HISTORY OF CORONARY ARTERY STENT PLACEMENT: ICD-10-CM

## 2024-06-25 DIAGNOSIS — I10 PRIMARY HYPERTENSION: ICD-10-CM

## 2024-06-25 DIAGNOSIS — E13.51 PERIPHERAL VASCULAR DISEASE DUE TO SECONDARY DIABETES (MULTI): ICD-10-CM

## 2024-06-25 DIAGNOSIS — E78.5 DYSLIPIDEMIA: ICD-10-CM

## 2024-06-25 DIAGNOSIS — I25.118 CORONARY ARTERY DISEASE OF NATIVE ARTERY OF NATIVE HEART WITH STABLE ANGINA PECTORIS (CMS-HCC): ICD-10-CM

## 2024-06-25 DIAGNOSIS — E78.49 OTHER HYPERLIPIDEMIA: ICD-10-CM

## 2024-06-25 DIAGNOSIS — I48.11 LONGSTANDING PERSISTENT ATRIAL FIBRILLATION (MULTI): ICD-10-CM

## 2024-06-25 DIAGNOSIS — I10 BENIGN ESSENTIAL HYPERTENSION: Primary | ICD-10-CM

## 2024-06-25 DIAGNOSIS — I50.22 CHF (CONGESTIVE HEART FAILURE), NYHA CLASS II, CHRONIC, SYSTOLIC (MULTI): ICD-10-CM

## 2024-06-25 PROBLEM — L97.901: Status: ACTIVE | Noted: 2024-06-25

## 2024-06-25 PROCEDURE — 99214 OFFICE O/P EST MOD 30 MIN: CPT | Performed by: INTERNAL MEDICINE

## 2024-06-25 PROCEDURE — 1159F MED LIST DOCD IN RCRD: CPT | Performed by: INTERNAL MEDICINE

## 2024-06-25 PROCEDURE — 93306 TTE W/DOPPLER COMPLETE: CPT

## 2024-06-25 PROCEDURE — 93306 TTE W/DOPPLER COMPLETE: CPT | Performed by: INTERNAL MEDICINE

## 2024-06-25 PROCEDURE — 3074F SYST BP LT 130 MM HG: CPT | Performed by: INTERNAL MEDICINE

## 2024-06-25 PROCEDURE — 3078F DIAST BP <80 MM HG: CPT | Performed by: INTERNAL MEDICINE

## 2024-06-25 PROCEDURE — 1036F TOBACCO NON-USER: CPT | Performed by: INTERNAL MEDICINE

## 2024-06-25 ASSESSMENT — ENCOUNTER SYMPTOMS
POOR WOUND HEALING: 1
BACK PAIN: 1
DYSPNEA ON EXERTION: 1

## 2024-06-25 NOTE — PROGRESS NOTES
Subjective   Trell Aguila is a 83 y.o. male.    Chief Complaint:  Exertional dyspnea.  Leg lesions.    HPI    Continues to experience orthopedic issues.  Has some arthritis problems in his knees.  Has had back surgery with a reasonable result.  No chest discomfort or chest pressure.  Has some lesions in his legs specifically as anterior aspect of his lower legs which are healing very slowly.  Also developed a lesion on his toe.    An echocardiographic study in December 2021 demonstrated normal left ventricular systolic function. There was moderate mitral regurgitation present.     In April 2021 he underwent lumbar spine surgery He had lumbar disc 1-5 repaired. He tolerated the procedure well and did not have any significant cardiac complications. He was able to discontinue anticoagulation and then restarted post procedure.      A stress thallium study performed on June 27, 2018 demonstrated fixed inferior lateral defects with a low normal left ventricular ejection fraction.     Cardiac catheterization in 2003 demonstrated to 25% left main cornea artery 25% left anterior descending coronary artery total occlusion of the circumflex was cornea artery and a 99% right coronary artery stenosis treated with balloon angioplasty and stent placement     Other cardiac problems including history of hypertension hyperlipidemia. He has chronic atrial fibrillation.      Allergies  Medication    · Clindamycin HCl CAPS   Recorded By: Michelle Florence; 10/21/2014 9:12:11 AM   · Codeine Derivatives   Recorded By: Michelle Florence; 10/21/2014 9:12:11 AM   · Penicillins   Recorded By: Michelle Florence; 10/21/2014 9:12:11 AM   · Sulfa Drugs   Recorded By: Michelle Florence; 10/21/2014 9:12:11 AM     Family History  Mother    · Family history of diabetes mellitus (V18.0) (Z83.3)  Father    · Family history of asthma (V17.5) (Z82.5)  Sister    · Family history of hypertension (V17.49) (Z82.49)   · Family history of malignant neoplasm (V16.9) (Z80.9)      Social History  Problems    · Does not use illicit drugs (V49.89) (Z78.9)   · Exercise frequency (times/week)   · pt does not exercise   · Former smoker (V15.82) (Z87.891)   · quit in dec 8 1997   · No alcohol use    Review of Systems   Cardiovascular:  Positive for dyspnea on exertion.   Skin:  Positive for poor wound healing.   Musculoskeletal:  Positive for arthritis, back pain and joint pain.       Current Outpatient Medications   Medication Sig Dispense Refill    albuterol 2.5 mg /3 mL (0.083 %) nebulizer solution Take 3 mL (2.5 mg) by nebulization every 8 hours if needed for wheezing. 90 mL 1    aspirin 81 mg EC tablet Take 1 tablet (81 mg) by mouth once daily.      blood sugar diagnostic (Contour Next Test Strips) strip Inject 1 strip into the skin 2 times a day. 200 strip 3    cetirizine (ZyrTEC) 10 mg tablet Take 1 tablet (10 mg) by mouth once daily.      cholecalciferol (Vitamin D-3) 50 MCG (2000 UT) tablet Take 1 tablet (2,000 Units) by mouth once daily.      clobetasoL 0.025 % cream Apply topically.      furosemide (Lasix) 40 mg tablet Take 1 tablet (40 mg) by mouth 2 times a day. 180 tablet 3    gabapentin (Neurontin) 600 mg tablet Take 1 tablet (600 mg) by mouth 3 times a day. 270 tablet 3    insulin glargine (Toujeo Max U-300 SoloStar) 300 unit/mL (3 mL) injection Inject 15 Units under the skin once daily at bedtime. Take as directed per insulin instructions. 3 mL 1    ketoconazole (NIZOral) 2 % cream APPLY EVERY DAY TO THE AFFECTED AREA ON GROIN AND FEET.      lancets (Microlet Lancet) misc USE TWICE DAILY 200 each 3    losartan (Cozaar) 100 mg tablet Take 1 tablet (100 mg) by mouth once daily. (Patient taking differently: Take 0.5 tablets (50 mg) by mouth once daily.) 90 tablet 3    metoprolol succinate XL (Toprol-XL) 100 mg 24 hr tablet Take 1 tablet (100 mg) by mouth once daily at bedtime. Do not crush or chew. 90 tablet 3    metoprolol succinate XL (Toprol-XL) 50 mg 24 hr tablet Take 1 tablet  "(50 mg) by mouth once daily in the morning. Take before meals. Do not crush or chew. 90 tablet 3    NICOTINAMIDE MONONUCLEOTIDE ORAL Take by mouth.      nitroglycerin (Nitrostat) 0.4 mg SL tablet Place 1 tablet (0.4 mg) under the tongue every 5 minutes if needed for chest pain.      pramoxine HCl (CERAVE ITCH RELIEF TOP) Apply topically.      rosuvastatin (Crestor) 10 mg tablet Take 1 tablet (10 mg) by mouth once daily. 90 tablet 3    SITagliptin-metformin (Janumet) 50-1,000 mg tablet Take 1 tablet by mouth 2 times a day.      tamsulosin (Flomax) 0.4 mg 24 hr capsule Take 1 capsule (0.4 mg) by mouth once daily at bedtime. 90 capsule 3    warfarin (Coumadin) 5 mg tablet Take 1 tablet (5 mg) by mouth once daily at bedtime. Take as directed per After Visit Summary. 90 tablet 3     No current facility-administered medications for this visit.        Visit Vitals  /70 (BP Location: Right arm)   Pulse 75   Ht 1.702 m (5' 7\")   Wt 97.5 kg (215 lb)   SpO2 95%   BMI 33.67 kg/m²   Smoking Status Former   BSA 2.15 m²        Objective     Constitutional:       Appearance: Not in distress.   Neck:      Vascular: JVD normal.   Pulmonary:      Breath sounds: Normal breath sounds.   Cardiovascular:      Normal rate. Regular rhythm. S1 with normal intensity. S2 with normal intensity.       Murmurs: There is a grade 1/6 systolic murmur.      No gallop.    Pulses:     Decreased pulses.   Edema:     Peripheral edema absent.   Abdominal:      General: Bowel sounds are normal.   Neurological:      Mental Status: Alert and oriented to person, place and time.         Lab Review:   Lab Results   Component Value Date     06/18/2024    K 4.2 06/18/2024     06/18/2024    CO2 27 06/18/2024    BUN 24 (H) 06/18/2024    CREATININE 1.35 (H) 06/18/2024    GLUCOSE 165 (H) 06/18/2024    CALCIUM 9.5 06/18/2024     Lab Results   Component Value Date    CHOL 107 06/18/2024    TRIG 151 (H) 06/18/2024    HDL 34.0 06/18/2024 "       Assessment:    1.  Coronary artery disease.  No anginal symptoms although activity levels are limited.    2.  Systolic congestive heart failure.  Ejection fraction of about 40%.  We started Jardiance and then he developed a severe rash.  He stopped the medication but is taken a long time for the rash to disappear.    3.  Paroxysmal atrial fibrillation.  On anticoagulation.    4.  Hypertension.  Blood pressures are normal.    5.  Peripheral vascular disease.  Absent distal pulses.  Poorly healing wounds on his lower legs.  Will get PVR studies.  If these are significantly abnormal we will refer to the endovascular service.

## 2024-06-26 ENCOUNTER — APPOINTMENT (OUTPATIENT)
Dept: PHARMACY | Facility: CLINIC | Age: 84
End: 2024-06-26
Payer: MEDICARE

## 2024-06-26 LAB
AORTIC VALVE MEAN GRADIENT: 4.5 MMHG
AORTIC VALVE PEAK VELOCITY: 1.5 M/S
AV PEAK GRADIENT: 9 MMHG
AVA (PEAK VEL): 2.37 CM2
AVA (VTI): 2.6 CM2
EJECTION FRACTION APICAL 4 CHAMBER: 38.2
EJECTION FRACTION: 43 %
LEFT ATRIUM VOLUME AREA LENGTH INDEX BSA: 47.3 ML/M2
LEFT VENTRICLE INTERNAL DIMENSION DIASTOLE: 5.01 CM (ref 3.5–6)
LEFT VENTRICULAR OUTFLOW TRACT DIAMETER: 2.09 CM
RIGHT VENTRICLE FREE WALL PEAK S': 0.8 CM/S
RIGHT VENTRICLE PEAK SYSTOLIC PRESSURE: 51.1 MMHG
TRICUSPID ANNULAR PLANE SYSTOLIC EXCURSION: 1.6 CM

## 2024-07-16 ENCOUNTER — APPOINTMENT (OUTPATIENT)
Dept: PRIMARY CARE | Facility: CLINIC | Age: 84
End: 2024-07-16
Payer: MEDICARE

## 2024-07-16 VITALS
BODY MASS INDEX: 33.59 KG/M2 | HEART RATE: 73 BPM | DIASTOLIC BLOOD PRESSURE: 60 MMHG | TEMPERATURE: 97.9 F | HEIGHT: 67 IN | RESPIRATION RATE: 16 BRPM | OXYGEN SATURATION: 96 % | WEIGHT: 214 LBS | SYSTOLIC BLOOD PRESSURE: 110 MMHG

## 2024-07-16 DIAGNOSIS — Z79.01 ANTICOAGULATED ON COUMADIN: Primary | ICD-10-CM

## 2024-07-16 DIAGNOSIS — R26.9 ABNORMAL GAIT: ICD-10-CM

## 2024-07-16 DIAGNOSIS — F45.21 HYPOCHONDRIASIS: ICD-10-CM

## 2024-07-16 DIAGNOSIS — M47.819 DEGENERATIVE SPINAL ARTHRITIS: ICD-10-CM

## 2024-07-16 DIAGNOSIS — E11.9 DIABETES MELLITUS WITHOUT COMPLICATION (MULTI): ICD-10-CM

## 2024-07-16 DIAGNOSIS — E11.65 UNCONTROLLED TYPE 2 DIABETES MELLITUS WITH HYPERGLYCEMIA (MULTI): ICD-10-CM

## 2024-07-16 DIAGNOSIS — M21.372 LEFT FOOT DROP: ICD-10-CM

## 2024-07-16 DIAGNOSIS — I10 BENIGN ESSENTIAL HYPERTENSION: ICD-10-CM

## 2024-07-16 DIAGNOSIS — M17.0 PRIMARY OSTEOARTHRITIS OF BOTH KNEES: ICD-10-CM

## 2024-07-16 LAB
HBA1C MFR BLD: 8.1 % (ref 4.2–6.5)
POC FINGERSTICK BLOOD GLUCOSE: 205 MG/DL (ref 70–100)

## 2024-07-16 PROCEDURE — 3074F SYST BP LT 130 MM HG: CPT | Performed by: FAMILY MEDICINE

## 2024-07-16 PROCEDURE — 1036F TOBACCO NON-USER: CPT | Performed by: FAMILY MEDICINE

## 2024-07-16 PROCEDURE — 83036 HEMOGLOBIN GLYCOSYLATED A1C: CPT | Mod: CLIA WAIVED TEST | Performed by: FAMILY MEDICINE

## 2024-07-16 PROCEDURE — 1126F AMNT PAIN NOTED NONE PRSNT: CPT | Performed by: FAMILY MEDICINE

## 2024-07-16 PROCEDURE — 82962 GLUCOSE BLOOD TEST: CPT | Performed by: FAMILY MEDICINE

## 2024-07-16 PROCEDURE — 1159F MED LIST DOCD IN RCRD: CPT | Performed by: FAMILY MEDICINE

## 2024-07-16 PROCEDURE — 1160F RVW MEDS BY RX/DR IN RCRD: CPT | Performed by: FAMILY MEDICINE

## 2024-07-16 PROCEDURE — 99214 OFFICE O/P EST MOD 30 MIN: CPT | Performed by: FAMILY MEDICINE

## 2024-07-16 PROCEDURE — 3078F DIAST BP <80 MM HG: CPT | Performed by: FAMILY MEDICINE

## 2024-07-16 ASSESSMENT — PAIN SCALES - GENERAL: PAINLEVEL: 0-NO PAIN

## 2024-07-16 NOTE — PROGRESS NOTES
Subjective   Trell Aguila is a 83 y.o. male who presents for Follow-up (Follow up for diabetes).    HPI  : Patient is an 83-year-old diabetic male who also suffers with OCD behavior and start of dementia.  He is accompanied with his wife he always has issues and concerns about health problems.  He has bruising on his arms due to his Coumadin that is followed at the Coumadin clinic and also by cardiology.  He will have his blood sugar and A1c checked today and he also has several other problems at he is worried about.  He worries about his blood pressure, he worries about his arthritis in the knees and he did have lumbar fusion surgery 2 years ago and is currently ambulating with a cane.  He has to be careful of his balance since his gait is unsteady and he must use a cane at all times.  He also follows closely with cardiology but he denies any current chest pain or angina.      Objective  : ROS : 10 systems were reviewed and the information is included in the HPI and no additional review of systems is indicated.    Physical Exam  Vitals and nursing note reviewed.   Constitutional:       Appearance: Normal appearance. He is obese.      Comments: Patient is alert and oriented x3.   No acute distress   HENT:      Head: Normocephalic.      Right Ear: Tympanic membrane and external ear normal.      Left Ear: Tympanic membrane and external ear normal.      Ears:      Comments: Ears are patent bilaterally and TMs are clear.     Nose: Nose normal.      Mouth/Throat:      Mouth: Mucous membranes are moist.      Pharynx: Oropharynx is clear.      Comments: Mouth is moist, tongue is midline.  No posterior pharyngeal erythema.  Eyes:      Extraocular Movements: Extraocular movements intact.      Conjunctiva/sclera: Conjunctivae normal.      Pupils: Pupils are equal, round, and reactive to light.      Comments: No visual disturbance, does have his  eyes examined once a year.   Neck:      Comments: Restricted range of motion  cervical spine due to arthritis and secondary muscle spasm.  No carotid bruits, no thyromegaly, no cervical adenopathy.   Cardiovascular:      Rate and Rhythm: Normal rate and regular rhythm.      Pulses: Normal pulses.      Heart sounds: Normal heart sounds.      Comments: Patient follows closely with cardiology.  He has had stents in the past.  Patient denies chest pain and no palpitations.  Heart rhythm is stable S1 and S2 are noted.  History of paroxysmal atrial fibrillation and is on Coumadin.  Pulmonary:      Effort: Pulmonary effort is normal.      Breath sounds: Normal breath sounds.      Comments: He does use a home nebulizer machine when he gets chest congestion.  Patient has a history of asthmatic bronchitis but his lungs have been very stable recently.  Patient denies any coughing or wheezing.  Lungs are clear to auscultation.    Abdominal:      General: Bowel sounds are normal.      Palpations: Abdomen is soft.      Comments: Abdomen is soft and obese,  but non tender,   No flank tenderness.  No suprapubic pain.  Positive bowel sounds .  No abdominal guarding and no rebound tenderness.   Genitourinary:     Comments: Patient denies dysuria, no hematuria, denies flank pain.  Nocturia.    Musculoskeletal:         General: Tenderness present. Normal range of motion.      Cervical back: Normal range of motion.      Comments: Spinal fusion 2 years ago, L1 thru L5  and stable.  Osteoarthritis  of hands and fingers.  DJD knees. Unsteady gait and left foot drop  .  Using a cane.     Skin:     General: Skin is warm.      Findings: Bruising present.      Comments: Skin tear and bruising.  Skin tear on the left arm was cleansed with Betadine and Bactroban dressing was applied.   Neurological:      General: No focal deficit present.      Mental Status: He is alert and oriented to person, place, and time. Mental status is at baseline.      Sensory: Sensory deficit present.      Comments: Patient does have a left foot  drop and also diabetic peripheral neuropathy.  Unsteady gait and uses a cane.   Poor coordination due to left foot drop and neuropathy.    Psychiatric:         Behavior: Behavior normal.         Thought Content: Thought content normal.         Judgment: Judgment normal.      Comments: Patient has  OCD  behavior .  Chronic anxiety.    Thought content and judgment are stable. Occasional forgetfulness.  He is starting with vascular dementia.  Wife sometimes has issues with his mood swings.     PLAN <: Patient has a 83-year-old diabetic male who is in for recheck on his sugar and A1c, blood pressure for hypertension and he also has many other concerns and questions.  He does worry about his health excessively and always has many different problems that he brings up during the visit.  He is becoming a little more forgetful and does have some mood swings which his wife is not very happy about.  Today his blood sugar was 205 and his A1c was 8.1%.  He knows that he has to be more conscientious with his diet but he often cheat night.  I did have a long discussion with both the patient and his wife since they are always recurring and having small arguments.  Hopefully they can improve their differences and his dementia does not get worse.    Problem List Items Addressed This Visit    None           Luis Newman, DO

## 2024-07-18 ENCOUNTER — ANTICOAGULATION - WARFARIN VISIT (OUTPATIENT)
Dept: PHARMACY | Facility: CLINIC | Age: 84
End: 2024-07-18
Payer: MEDICARE

## 2024-07-18 DIAGNOSIS — I48.0 PAROXYSMAL ATRIAL FIBRILLATION (MULTI): Primary | ICD-10-CM

## 2024-07-18 LAB
POC INR: 3.4
POC PROTHROMBIN TIME: NORMAL

## 2024-07-18 PROCEDURE — 85610 PROTHROMBIN TIME: CPT | Mod: QW | Performed by: PHARMACIST

## 2024-07-18 PROCEDURE — 99212 OFFICE O/P EST SF 10 MIN: CPT | Performed by: PHARMACIST

## 2024-07-18 NOTE — PROGRESS NOTES
Subjective   Trell Aguila is a 83 y.o. male.    Chief Complaint:  Follow-up coronary disease congestive heart failure.  Follow-up evaluation for leg pain.    HPI    On his last visit he presented with leg pains.  We had difficulty finding his pulses.  We proceeded with a vascular study.  He continues to complain orthopedic issues.  His wife reports that he is having some visual hallucinations at night.  No anginal symptoms.  Has had problems with very easy bruisability.  Also has a superficial erosion on his left arm.    An echocardiographic study in December 2021 demonstrated normal left ventricular systolic function. There was moderate mitral regurgitation present.     In April 2021 he underwent lumbar spine surgery He had lumbar disc 1-5 repaired. He tolerated the procedure well and did not have any significant cardiac complications. He was able to discontinue anticoagulation and then restarted post procedure.      A stress thallium study performed on June 27, 2018 demonstrated fixed inferior lateral defects with a low normal left ventricular ejection fraction.     Cardiac catheterization in 2003 demonstrated to 25% left main cornea artery 25% left anterior descending coronary artery total occlusion of the circumflex was cornea artery and a 99% right coronary artery stenosis treated with balloon angioplasty and stent placement     Other cardiac problems including history of hypertension hyperlipidemia. He has chronic atrial fibrillation.      Allergies  Medication    · Clindamycin HCl CAPS   Recorded By: Michelle Florence; 10/21/2014 9:12:11 AM   · Codeine Derivatives   Recorded By: Michelle Florence; 10/21/2014 9:12:11 AM   · Penicillins   Recorded By: Michelle Florence; 10/21/2014 9:12:11 AM   · Sulfa Drugs   Recorded By: Michelle Florence; 10/21/2014 9:12:11 AM     Family History  Mother    · Family history of diabetes mellitus (V18.0) (Z83.3)  Father    · Family history of asthma (V17.5) (Z82.5)  Sister    · Family history of  hypertension (V17.49) (Z82.49)   · Family history of malignant neoplasm (V16.9) (Z80.9)     Social History  Problems    · Does not use illicit drugs (V49.89) (Z78.9)   · Exercise frequency (times/week)   · pt does not exercise   · Former smoker (V15.82) (Z87.891)   · quit in dec 8 1997   · No alcohol use    Review of Systems   Constitutional: Positive for malaise/fatigue.   Cardiovascular:  Positive for dyspnea on exertion.   Hematologic/Lymphatic: Bruises/bleeds easily.   Skin:  Positive for poor wound healing.   Musculoskeletal:  Positive for arthritis and joint pain.       Current Outpatient Medications   Medication Sig Dispense Refill    albuterol 2.5 mg /3 mL (0.083 %) nebulizer solution Take 3 mL (2.5 mg) by nebulization every 8 hours if needed for wheezing. 90 mL 1    aspirin 81 mg EC tablet Take 1 tablet (81 mg) by mouth once daily.      blood sugar diagnostic (Contour Next Test Strips) strip Inject 1 strip into the skin 2 times a day. 200 strip 3    cetirizine (ZyrTEC) 10 mg tablet Take 1 tablet (10 mg) by mouth once daily.      cholecalciferol (Vitamin D-3) 50 MCG (2000 UT) tablet Take 1 tablet (2,000 Units) by mouth once daily.      clobetasoL 0.025 % cream Apply topically.      furosemide (Lasix) 40 mg tablet Take 1 tablet (40 mg) by mouth 2 times a day. 180 tablet 3    gabapentin (Neurontin) 600 mg tablet Take 1 tablet (600 mg) by mouth 3 times a day. 270 tablet 3    insulin glargine (Toujeo Max U-300 SoloStar) 300 unit/mL (3 mL) injection Inject 15 Units under the skin once daily at bedtime. Take as directed per insulin instructions. 3 mL 1    ketoconazole (NIZOral) 2 % cream APPLY EVERY DAY TO THE AFFECTED AREA ON GROIN AND FEET.      lancets (Microlet Lancet) misc USE TWICE DAILY 200 each 3    losartan (Cozaar) 100 mg tablet Take 1 tablet (100 mg) by mouth once daily. (Patient taking differently: Take 0.5 tablets (50 mg) by mouth once daily.) 90 tablet 3    metoprolol succinate XL (Toprol-XL) 100 mg  24 hr tablet Take 1 tablet (100 mg) by mouth once daily at bedtime. Do not crush or chew. 90 tablet 3    metoprolol succinate XL (Toprol-XL) 50 mg 24 hr tablet Take 1 tablet (50 mg) by mouth once daily in the morning. Take before meals. Do not crush or chew. 90 tablet 3    NICOTINAMIDE MONONUCLEOTIDE ORAL Take by mouth.      nitroglycerin (Nitrostat) 0.4 mg SL tablet Place 1 tablet (0.4 mg) under the tongue every 5 minutes if needed for chest pain.      pramoxine HCl (CERAVE ITCH RELIEF TOP) Apply topically.      rosuvastatin (Crestor) 10 mg tablet Take 1 tablet (10 mg) by mouth once daily. 90 tablet 3    SITagliptin-metformin (Janumet) 50-1,000 mg tablet Take 1 tablet by mouth 2 times a day.      tamsulosin (Flomax) 0.4 mg 24 hr capsule Take 1 capsule (0.4 mg) by mouth once daily at bedtime. 90 capsule 3    warfarin (Coumadin) 5 mg tablet Take 1 tablet (5 mg) by mouth once daily at bedtime. Take as directed per After Visit Summary. 90 tablet 3     No current facility-administered medications for this visit.        Visit Vitals  Smoking Status Former        Objective     Constitutional:       Appearance: Not in distress.   Neck:      Vascular: JVD normal.   Pulmonary:      Breath sounds: Normal breath sounds.   Cardiovascular:      Normal rate. Regular rhythm. S1 with normal intensity. S2 with normal intensity.       Murmurs: There is a grade 1/6 systolic murmur.      No gallop.    Pulses:     Intact distal pulses.   Edema:     Peripheral edema absent.   Abdominal:      General: Bowel sounds are normal.   Neurological:      Mental Status: Alert and oriented to person, place and time.         Lab Review:   Lab Results   Component Value Date     06/18/2024    K 4.2 06/18/2024     06/18/2024    CO2 27 06/18/2024    BUN 24 (H) 06/18/2024    CREATININE 1.35 (H) 06/18/2024    GLUCOSE 165 (H) 06/18/2024    CALCIUM 9.5 06/18/2024     Lab Results   Component Value Date    CHOL 107 06/18/2024    TRIG 151 (H)  06/18/2024    HDL 34.0 06/18/2024       Assessment:    1.  Coronary disease.  No anginal symptoms.    2.  Systolic congestive heart failure.  No heart failure by exam.    3.  Easy bruisability.  Will stop aspirin.    4.  Leg pains.  PVR studies are normal

## 2024-07-18 NOTE — PROGRESS NOTES
Verified current dose with pt.    No new meds or med changes since last visit.  Pt denies unusual bleed/bruise.  No upcoming procedures.  Inr = 3.4  Pt says diet has been same.  Pt eats iceburg lettuce, but no greens.    Pt thinks he had some tylenol over past week.  No etoh  No n/v.  But pt some diarrhea last week; resolved now.    Hold dose today  Continue same dose and check again in 3 weeks.      7/19/24 - 0930  Pt wife calls.  Pt has infection in arm and went to urgent care.  Prescriptions for mupirocin oint and doxycycline 100 mg bid x 10 days.    Will have pt come back sooner for a check

## 2024-07-19 ENCOUNTER — APPOINTMENT (OUTPATIENT)
Dept: PHARMACY | Facility: CLINIC | Age: 84
End: 2024-07-19
Payer: MEDICARE

## 2024-07-19 ENCOUNTER — HOSPITAL ENCOUNTER (OUTPATIENT)
Dept: VASCULAR MEDICINE | Facility: CLINIC | Age: 84
Discharge: HOME | End: 2024-07-19
Payer: MEDICARE

## 2024-07-19 ENCOUNTER — APPOINTMENT (OUTPATIENT)
Dept: CARDIOLOGY | Facility: CLINIC | Age: 84
End: 2024-07-19
Payer: MEDICARE

## 2024-07-19 VITALS
HEIGHT: 67 IN | SYSTOLIC BLOOD PRESSURE: 126 MMHG | DIASTOLIC BLOOD PRESSURE: 68 MMHG | WEIGHT: 214 LBS | HEART RATE: 72 BPM | BODY MASS INDEX: 33.59 KG/M2 | OXYGEN SATURATION: 97 %

## 2024-07-19 DIAGNOSIS — E78.49 OTHER HYPERLIPIDEMIA: ICD-10-CM

## 2024-07-19 DIAGNOSIS — I48.11 LONGSTANDING PERSISTENT ATRIAL FIBRILLATION (MULTI): ICD-10-CM

## 2024-07-19 DIAGNOSIS — I34.0 MODERATE MITRAL REGURGITATION: ICD-10-CM

## 2024-07-19 DIAGNOSIS — I25.118 CORONARY ARTERY DISEASE OF NATIVE ARTERY OF NATIVE HEART WITH STABLE ANGINA PECTORIS (CMS-HCC): ICD-10-CM

## 2024-07-19 DIAGNOSIS — I10 PRIMARY HYPERTENSION: ICD-10-CM

## 2024-07-19 DIAGNOSIS — I50.22 CHF (CONGESTIVE HEART FAILURE), NYHA CLASS II, CHRONIC, SYSTOLIC (MULTI): ICD-10-CM

## 2024-07-19 DIAGNOSIS — I10 BENIGN ESSENTIAL HYPERTENSION: Primary | ICD-10-CM

## 2024-07-19 DIAGNOSIS — I73.9 PERIPHERAL VASCULAR DISEASE, UNSPECIFIED (CMS-HCC): ICD-10-CM

## 2024-07-19 DIAGNOSIS — E13.51 PERIPHERAL VASCULAR DISEASE DUE TO SECONDARY DIABETES (MULTI): ICD-10-CM

## 2024-07-19 DIAGNOSIS — Z95.5 HISTORY OF CORONARY ARTERY STENT PLACEMENT: ICD-10-CM

## 2024-07-19 DIAGNOSIS — I35.0 MILD AORTIC STENOSIS: ICD-10-CM

## 2024-07-19 LAB — BODY SURFACE AREA: 2.14 M2

## 2024-07-19 PROCEDURE — 3078F DIAST BP <80 MM HG: CPT | Performed by: INTERNAL MEDICINE

## 2024-07-19 PROCEDURE — 1159F MED LIST DOCD IN RCRD: CPT | Performed by: INTERNAL MEDICINE

## 2024-07-19 PROCEDURE — 93923 UPR/LXTR ART STDY 3+ LVLS: CPT | Performed by: INTERNAL MEDICINE

## 2024-07-19 PROCEDURE — 3074F SYST BP LT 130 MM HG: CPT | Performed by: INTERNAL MEDICINE

## 2024-07-19 PROCEDURE — 1036F TOBACCO NON-USER: CPT | Performed by: INTERNAL MEDICINE

## 2024-07-19 PROCEDURE — 93923 UPR/LXTR ART STDY 3+ LVLS: CPT

## 2024-07-19 PROCEDURE — 99213 OFFICE O/P EST LOW 20 MIN: CPT | Performed by: INTERNAL MEDICINE

## 2024-07-19 RX ORDER — DOXYCYCLINE 100 MG/1
CAPSULE ORAL
COMMUNITY
Start: 2024-07-18

## 2024-07-19 ASSESSMENT — ENCOUNTER SYMPTOMS
DYSPNEA ON EXERTION: 1
POOR WOUND HEALING: 1
BRUISES/BLEEDS EASILY: 1

## 2024-07-24 ENCOUNTER — ANTICOAGULATION - WARFARIN VISIT (OUTPATIENT)
Dept: PHARMACY | Facility: CLINIC | Age: 84
End: 2024-07-24
Payer: MEDICARE

## 2024-07-24 DIAGNOSIS — I48.91 ATRIAL FIBRILLATION, UNSPECIFIED TYPE (MULTI): ICD-10-CM

## 2024-07-24 DIAGNOSIS — M25.472 ANKLE EDEMA, BILATERAL: ICD-10-CM

## 2024-07-24 DIAGNOSIS — I48.0 PAROXYSMAL ATRIAL FIBRILLATION (MULTI): Primary | ICD-10-CM

## 2024-07-24 DIAGNOSIS — M25.471 ANKLE EDEMA, BILATERAL: ICD-10-CM

## 2024-07-24 LAB
POC INR: 2.2
POC PROTHROMBIN TIME: NORMAL

## 2024-07-24 PROCEDURE — 99212 OFFICE O/P EST SF 10 MIN: CPT | Performed by: PHARMACIST

## 2024-07-24 PROCEDURE — 85610 PROTHROMBIN TIME: CPT | Mod: QW | Performed by: PHARMACIST

## 2024-07-24 NOTE — PROGRESS NOTES
Trell Aguila is a 83 y.o. male with history of Atrial Fibrillation who presents today for anticoagulation monitoring and adjustment.  INR 2.2 is therapeutic for this patient (goal range 2-3) and is reflective of 22.5 mg TWD  Patient verifies current dosing regimen, patient able to verbally recall dose  Patient reports 0 missed doses since last INR  Last INR 3.4 on 7/18/24 (1 week interval)  Patient denies s/sx clotting and/or stroke  Patient denies hematuria, epistaxis, rectal bleeding  Patient denies changes in diet, alcohol, or tobacco use  Vegetable intake consistent from week to week  Reviewed medication list and drug allergies with patient, updated any medication additions or modifications accordingly  Acetaminophen intake: no changes   Patient also denies any pending medical or dental procedures scheduled at this time  Patient was instructed to continue same weekly dose and RTC 5 weeks    Patient started taking Doxycycline 7/19/24 (Day 5 of 10)    Zuly Barrios, PharmD, BCPS   7/24/2024 2:17 PM

## 2024-07-25 DIAGNOSIS — M25.471 ANKLE EDEMA, BILATERAL: ICD-10-CM

## 2024-07-25 DIAGNOSIS — M25.472 ANKLE EDEMA, BILATERAL: ICD-10-CM

## 2024-07-25 RX ORDER — FUROSEMIDE 40 MG/1
40 TABLET ORAL 2 TIMES DAILY
Qty: 180 TABLET | Refills: 3 | Status: SHIPPED | OUTPATIENT
Start: 2024-07-25

## 2024-08-08 ENCOUNTER — APPOINTMENT (OUTPATIENT)
Dept: PHARMACY | Facility: CLINIC | Age: 84
End: 2024-08-08
Payer: MEDICARE

## 2024-08-13 ENCOUNTER — APPOINTMENT (OUTPATIENT)
Dept: PRIMARY CARE | Facility: CLINIC | Age: 84
End: 2024-08-13
Payer: MEDICARE

## 2024-08-13 VITALS
RESPIRATION RATE: 18 BRPM | BODY MASS INDEX: 33.9 KG/M2 | SYSTOLIC BLOOD PRESSURE: 142 MMHG | HEIGHT: 67 IN | TEMPERATURE: 98 F | DIASTOLIC BLOOD PRESSURE: 79 MMHG | OXYGEN SATURATION: 97 % | WEIGHT: 216 LBS | HEART RATE: 72 BPM

## 2024-08-13 DIAGNOSIS — Z79.01 ANTICOAGULATED ON COUMADIN: ICD-10-CM

## 2024-08-13 DIAGNOSIS — E11.9 DIABETES MELLITUS WITHOUT COMPLICATION (MULTI): ICD-10-CM

## 2024-08-13 DIAGNOSIS — M65.9 TENOSYNOVITIS OF WRIST: ICD-10-CM

## 2024-08-13 DIAGNOSIS — I10 BENIGN ESSENTIAL HYPERTENSION: ICD-10-CM

## 2024-08-13 DIAGNOSIS — J45.40 MODERATE PERSISTENT ASTHMATIC BRONCHITIS WITHOUT COMPLICATION (HHS-HCC): ICD-10-CM

## 2024-08-13 DIAGNOSIS — I10 PRIMARY HYPERTENSION: ICD-10-CM

## 2024-08-13 DIAGNOSIS — E11.42 DIABETIC POLYNEUROPATHY ASSOCIATED WITH TYPE 2 DIABETES MELLITUS (MULTI): ICD-10-CM

## 2024-08-13 DIAGNOSIS — R26.9 ABNORMAL GAIT: ICD-10-CM

## 2024-08-13 DIAGNOSIS — E78.5 DYSLIPIDEMIA: ICD-10-CM

## 2024-08-13 DIAGNOSIS — S63.502A SPRAIN OF LEFT WRIST, INITIAL ENCOUNTER: ICD-10-CM

## 2024-08-13 DIAGNOSIS — M25.432 WRIST SWELLING, LEFT: Primary | ICD-10-CM

## 2024-08-13 LAB
HBA1C MFR BLD: 8.4 % (ref 4.2–6.5)
POC FINGERSTICK BLOOD GLUCOSE: 177 MG/DL (ref 70–100)

## 2024-08-13 PROCEDURE — 3078F DIAST BP <80 MM HG: CPT | Performed by: FAMILY MEDICINE

## 2024-08-13 PROCEDURE — 1158F ADVNC CARE PLAN TLK DOCD: CPT | Performed by: FAMILY MEDICINE

## 2024-08-13 PROCEDURE — 83036 HEMOGLOBIN GLYCOSYLATED A1C: CPT | Mod: CLIA WAIVED TEST | Performed by: FAMILY MEDICINE

## 2024-08-13 PROCEDURE — 3077F SYST BP >= 140 MM HG: CPT | Performed by: FAMILY MEDICINE

## 2024-08-13 PROCEDURE — 82962 GLUCOSE BLOOD TEST: CPT | Performed by: FAMILY MEDICINE

## 2024-08-13 PROCEDURE — 1036F TOBACCO NON-USER: CPT | Performed by: FAMILY MEDICINE

## 2024-08-13 PROCEDURE — 99214 OFFICE O/P EST MOD 30 MIN: CPT | Performed by: FAMILY MEDICINE

## 2024-08-13 PROCEDURE — 1126F AMNT PAIN NOTED NONE PRSNT: CPT | Performed by: FAMILY MEDICINE

## 2024-08-13 PROCEDURE — 20605 DRAIN/INJ JOINT/BURSA W/O US: CPT | Performed by: FAMILY MEDICINE

## 2024-08-13 PROCEDURE — 1123F ACP DISCUSS/DSCN MKR DOCD: CPT | Performed by: FAMILY MEDICINE

## 2024-08-13 PROCEDURE — 1159F MED LIST DOCD IN RCRD: CPT | Performed by: FAMILY MEDICINE

## 2024-08-13 PROCEDURE — 1160F RVW MEDS BY RX/DR IN RCRD: CPT | Performed by: FAMILY MEDICINE

## 2024-08-13 RX ORDER — LIDOCAINE HYDROCHLORIDE 10 MG/ML
2 INJECTION INFILTRATION; PERINEURAL
Status: COMPLETED | OUTPATIENT
Start: 2024-08-13 | End: 2024-08-13

## 2024-08-13 RX ORDER — ROSUVASTATIN CALCIUM 10 MG/1
10 TABLET, COATED ORAL DAILY
Qty: 90 TABLET | Refills: 3 | Status: SHIPPED | OUTPATIENT
Start: 2024-08-13 | End: 2025-08-13

## 2024-08-13 RX ORDER — LOSARTAN POTASSIUM 50 MG/1
50 TABLET ORAL DAILY
Qty: 90 TABLET | Refills: 3 | Status: SHIPPED | OUTPATIENT
Start: 2024-08-13

## 2024-08-13 ASSESSMENT — PATIENT HEALTH QUESTIONNAIRE - PHQ9
1. LITTLE INTEREST OR PLEASURE IN DOING THINGS: NOT AT ALL
SUM OF ALL RESPONSES TO PHQ9 QUESTIONS 1 AND 2: 0
2. FEELING DOWN, DEPRESSED OR HOPELESS: NOT AT ALL

## 2024-08-13 ASSESSMENT — PAIN SCALES - GENERAL: PAINLEVEL: 0-NO PAIN

## 2024-08-13 NOTE — PROGRESS NOTES
Subjective   Trell Aguila is a 83 y.o. male who presents for Follow-up (Follow up visit for Coumadin level).    HPI  : Patient is an 83-year-old diabetic male who is in for recheck on his blood sugar and his A1c, and also discussion about his arthritic problems with his knees, his wrists and his elbows.  He does suffer with arthritis and almost all his joints and he is ambulating with a cane.  He does have a chronic left foot drop and he has to be cautious about his balance.  Approximately 2 and half years ago he did have a lumbar fusion surgery and that has been stable.  He is scheduled to go for gel shots in his right knee.  He also recently saw cardiology and his cardiac status is stable his Coumadin is monitored at the Coumadin clinic.  Patient is also concerned about pain in his left wrist and was hoping to get a cortisone shot.      Objective  : ROS : 10 systems were reviewed and the information is included in the HPI and no additional review of systems is indicated.    Physical Exam  Vitals and nursing note reviewed.   Constitutional:       Appearance: Normal appearance. He is obese.      Comments: Patient is alert and oriented x3.   No acute distress   HENT:      Head: Normocephalic.      Right Ear: Tympanic membrane and external ear normal.      Left Ear: Tympanic membrane and external ear normal.      Ears:      Comments: Ears are patent bilaterally and TMs are clear.     Nose: Nose normal.      Mouth/Throat:      Mouth: Mucous membranes are dry.      Pharynx: Oropharynx is clear.      Comments: Mouth is moist, tongue is midline.  No posterior pharyngeal erythema.  Eyes:      Extraocular Movements: Extraocular movements intact.      Conjunctiva/sclera: Conjunctivae normal.      Pupils: Pupils are equal, round, and reactive to light.      Comments: No visual disturbance, does have his  eyes examined once a year.   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  He was  instructed on some range of motion exercises.  No carotid bruits, no thyromegaly, no cervical adenopathy.  Chronic neck spasm and restriction of motion secondary to arthritis, stress and tension.  Cardiovascular:      Rate and Rhythm: Normal rate. Rhythm irregular.      Pulses: Normal pulses.      Heart sounds: Normal heart sounds.      Comments: Atrial fibrillation.  Follows with cardiology.  Patient denies chest pain and no palpitations.  Heart rhythm is irregular, S1 and S2 are noted.   Pulmonary:      Effort: Pulmonary effort is normal.      Breath sounds: Rhonchi present.      Comments: Long history of asthma. Has home nebulizer machine.  Lungs  with few rhonchi  to auscultation.   Abdominal:      General: Bowel sounds are normal.      Palpations: Abdomen is soft.      Comments: Abdomen is soft and obese and difficult to evaluate due to obesity.  No flank tenderness.  No suprapubic pain.  Positive bowel sounds.  No abdominal guarding and no rebound tenderness.   Genitourinary:     Comments: Patient denies dysuria, no hematuria,  denies flank pain.  Patient does have nocturia.    Musculoskeletal:         General: Tenderness present. Normal range of motion.      Cervical back: Normal range of motion. Tenderness present.      Comments: Post spinal fusion  2 years ago.   Arthritis both knees .  Left wrist pain and sprain.    Arthritis  in all his joints.     Skin:     General: Skin is warm and dry.      Findings: Bruising present.      Comments: Few bruises on arms  from Coumadin.     Neurological:      General: No focal deficit present.      Mental Status: He is alert and oriented to person, place, and time. Mental status is at baseline.      Sensory: Sensory deficit present.      Comments: Patient does get paresthesias from lumbosacral disc disease and arthritis.  Chronic left foot drop.  Patient does have diabetic peripheral neuropathy.  Unsteady gait and coordination, some lower extremity muscle weakness.    Psychiatric:         Mood and Affect: Mood normal.         Behavior: Behavior normal.         Thought Content: Thought content normal.         Judgment: Judgment normal.      Comments: Patient has normal mood and affect.  Thought content and judgment are stable.  Age-related forgetfulness.  He does sometimes rely on his wife for answering questions.       Patient ID: Trell Aguila is a 83 y.o. male.    Joint Injection Medium/Arthrocentesis: L ulnocarpal on 8/13/2024 12:35 PM  Indications: pain and joint swelling  Details: 25 G needle, lateral approach  Medications: 10 mg triamcinolone acetonide 10 mg/mL; 2 mL lidocaine 10 mg/mL (1 %)    Betadine cleanse left lateral wrist.  10 mg Kenalog +2 mL lidocaine 1% injected into the left lateral wrist under sterile technique.  There were no problems or complications and a sterile Band-Aid was applied.  Consent was given by the patient. Immediately prior to procedure a time out was called to verify the correct patient, procedure, equipment, support staff and site/side marked as required. Patient was prepped and draped in the usual sterile fashion.       PLAN : Patient is an 83-year-old male with multiple medical problems who was evaluated today for blood sugar and A1c check.  His blood sugar was 177 and A1c went up to 8.4% up from 8.1% last visit.  He does try to watch his diet but he sometimes cheats in the evening and likes to have fruit, grapes and cherries.  I have encouraged him to try to watch his diet especially in the evening and cut down on the sweets.  He also follows with cardiology and his cardiac status is stable and he is on Coumadin.  He also follows with orthopedics and is scheduled to have gel shots in his right knee in 2 weeks.  As noted in the procedure note above he received 10 mg Kenalog +2 mL lidocaine into the left wrist for pain and swelling.  Patient otherwise is stable and will follow-up as needed in 2 to 3 months.  He is with his wife and he is  always worried about different health issues and I tried to encourage him to get out and try to walk and get some exercise and lose some weight.  Patient tends to sit and watch TV most of the day and stays very inactive.    Problem List Items Addressed This Visit       Diabetes mellitus without complication (Multi)    Relevant Orders    POCT fingerstick glucose manually resulted    POCT Glycosylated Hemoglobin (HGB A1C) docked device    Anticoagulated on Coumadin    Relevant Orders    POCT INR manually resulted            Luis Newman, DO

## 2024-08-20 ENCOUNTER — APPOINTMENT (OUTPATIENT)
Dept: PRIMARY CARE | Facility: CLINIC | Age: 84
End: 2024-08-20
Payer: MEDICARE

## 2024-08-27 ENCOUNTER — APPOINTMENT (OUTPATIENT)
Dept: PHARMACY | Facility: CLINIC | Age: 84
End: 2024-08-27
Payer: MEDICARE

## 2024-08-29 ENCOUNTER — TELEPHONE (OUTPATIENT)
Dept: PRIMARY CARE | Facility: CLINIC | Age: 84
End: 2024-08-29
Payer: MEDICARE

## 2024-08-29 ENCOUNTER — ANTICOAGULATION - WARFARIN VISIT (OUTPATIENT)
Dept: PHARMACY | Facility: CLINIC | Age: 84
End: 2024-08-29
Payer: MEDICARE

## 2024-08-29 DIAGNOSIS — I48.0 PAROXYSMAL ATRIAL FIBRILLATION (MULTI): Primary | ICD-10-CM

## 2024-08-29 DIAGNOSIS — J45.41 MODERATE PERSISTENT ASTHMATIC BRONCHITIS WITH ACUTE EXACERBATION (HHS-HCC): Primary | ICD-10-CM

## 2024-08-29 LAB
POC INR: 3.6
POC PROTHROMBIN TIME: NORMAL

## 2024-08-29 PROCEDURE — 99212 OFFICE O/P EST SF 10 MIN: CPT | Performed by: PHARMACIST

## 2024-08-29 PROCEDURE — 85610 PROTHROMBIN TIME: CPT | Mod: QW | Performed by: PHARMACIST

## 2024-08-29 RX ORDER — AZITHROMYCIN 250 MG/1
TABLET, FILM COATED ORAL
Qty: 6 TABLET | Refills: 0 | Status: SHIPPED | OUTPATIENT
Start: 2024-08-29 | End: 2024-09-03

## 2024-08-29 NOTE — PROGRESS NOTES
Pt found out yesterday he is Covid +    inr = 3.6.    Pt is not feeling well - he will be starting z-keyona.  Pt is not taking any otc meds for any other symptoms.  Pt has not been eating too much over the last week.    Will hold dose today (thurs)  Pt says no unusual bleed/bruise.    Pt should be checked in 1 week (zpak, illness, high inr)

## 2024-09-05 ENCOUNTER — ANTICOAGULATION - WARFARIN VISIT (OUTPATIENT)
Dept: PHARMACY | Facility: CLINIC | Age: 84
End: 2024-09-05
Payer: MEDICARE

## 2024-09-05 DIAGNOSIS — I48.0 PAROXYSMAL ATRIAL FIBRILLATION (MULTI): Primary | ICD-10-CM

## 2024-09-05 LAB
POC INR: 2.6
POC PROTHROMBIN TIME: NORMAL

## 2024-09-05 PROCEDURE — 85610 PROTHROMBIN TIME: CPT | Mod: QW | Performed by: PHARMACIST

## 2024-09-05 PROCEDURE — 99212 OFFICE O/P EST SF 10 MIN: CPT | Performed by: PHARMACIST

## 2024-09-05 NOTE — PROGRESS NOTES
Coumadin Clinic Visit Note    Patient verified warfarin dose  No missed doses  No unusual bruising or bleeding  No changes to medications, he had covid last week and was on zpack , INR was 3.6 last week , runny nose , no fever   Consistent dietary green intake  No anticipated procedures at this time  INR Therapeutic today at 2.6  No changes to warfarin dose today  Next appointment 5 weeks       Alesha Steen, PharmD

## 2024-09-25 ENCOUNTER — APPOINTMENT (OUTPATIENT)
Dept: PRIMARY CARE | Facility: CLINIC | Age: 84
End: 2024-09-25
Payer: MEDICARE

## 2024-09-25 VITALS
RESPIRATION RATE: 18 BRPM | SYSTOLIC BLOOD PRESSURE: 110 MMHG | OXYGEN SATURATION: 94 % | WEIGHT: 219 LBS | DIASTOLIC BLOOD PRESSURE: 62 MMHG | HEIGHT: 67 IN | BODY MASS INDEX: 34.37 KG/M2 | TEMPERATURE: 97.9 F | HEART RATE: 72 BPM

## 2024-09-25 DIAGNOSIS — E11.9 DIABETES MELLITUS WITHOUT COMPLICATION (MULTI): ICD-10-CM

## 2024-09-25 DIAGNOSIS — Z00.00 ROUTINE GENERAL MEDICAL EXAMINATION AT HEALTH CARE FACILITY: ICD-10-CM

## 2024-09-25 DIAGNOSIS — I10 PRIMARY HYPERTENSION: ICD-10-CM

## 2024-09-25 DIAGNOSIS — E11.42 DIABETIC POLYNEUROPATHY ASSOCIATED WITH TYPE 2 DIABETES MELLITUS (MULTI): ICD-10-CM

## 2024-09-25 DIAGNOSIS — M77.12 LEFT LATERAL EPICONDYLITIS: ICD-10-CM

## 2024-09-25 DIAGNOSIS — M21.372 LEFT FOOT DROP: ICD-10-CM

## 2024-09-25 DIAGNOSIS — Z23 NEED FOR PNEUMOCOCCAL VACCINE: ICD-10-CM

## 2024-09-25 DIAGNOSIS — Z00.00 MEDICARE ANNUAL WELLNESS VISIT, SUBSEQUENT: Primary | ICD-10-CM

## 2024-09-25 DIAGNOSIS — J45.40 MODERATE PERSISTENT ASTHMATIC BRONCHITIS WITHOUT COMPLICATION (HHS-HCC): ICD-10-CM

## 2024-09-25 DIAGNOSIS — M47.22 OSTEOARTHRITIS OF SPINE WITH RADICULOPATHY, CERVICAL REGION: ICD-10-CM

## 2024-09-25 DIAGNOSIS — Z23 NEEDS FLU SHOT: ICD-10-CM

## 2024-09-25 DIAGNOSIS — F41.1 GENERALIZED ANXIETY DISORDER: ICD-10-CM

## 2024-09-25 LAB
HBA1C MFR BLD: 8 % (ref 4.2–6.5)
POC FINGERSTICK BLOOD GLUCOSE: 135 MG/DL (ref 70–100)

## 2024-09-25 PROCEDURE — 3078F DIAST BP <80 MM HG: CPT | Performed by: FAMILY MEDICINE

## 2024-09-25 PROCEDURE — G0009 ADMIN PNEUMOCOCCAL VACCINE: HCPCS | Performed by: FAMILY MEDICINE

## 2024-09-25 PROCEDURE — 99214 OFFICE O/P EST MOD 30 MIN: CPT | Performed by: FAMILY MEDICINE

## 2024-09-25 PROCEDURE — 1159F MED LIST DOCD IN RCRD: CPT | Performed by: FAMILY MEDICINE

## 2024-09-25 PROCEDURE — G0439 PPPS, SUBSEQ VISIT: HCPCS | Performed by: FAMILY MEDICINE

## 2024-09-25 PROCEDURE — 82962 GLUCOSE BLOOD TEST: CPT | Performed by: FAMILY MEDICINE

## 2024-09-25 PROCEDURE — 1126F AMNT PAIN NOTED NONE PRSNT: CPT | Performed by: FAMILY MEDICINE

## 2024-09-25 PROCEDURE — 1123F ACP DISCUSS/DSCN MKR DOCD: CPT | Performed by: FAMILY MEDICINE

## 2024-09-25 PROCEDURE — 1036F TOBACCO NON-USER: CPT | Performed by: FAMILY MEDICINE

## 2024-09-25 PROCEDURE — 83036 HEMOGLOBIN GLYCOSYLATED A1C: CPT | Mod: CLIA WAIVED TEST | Performed by: FAMILY MEDICINE

## 2024-09-25 PROCEDURE — 20605 DRAIN/INJ JOINT/BURSA W/O US: CPT | Performed by: FAMILY MEDICINE

## 2024-09-25 PROCEDURE — G0008 ADMIN INFLUENZA VIRUS VAC: HCPCS | Performed by: FAMILY MEDICINE

## 2024-09-25 PROCEDURE — 3074F SYST BP LT 130 MM HG: CPT | Performed by: FAMILY MEDICINE

## 2024-09-25 PROCEDURE — 1170F FXNL STATUS ASSESSED: CPT | Performed by: FAMILY MEDICINE

## 2024-09-25 PROCEDURE — 90662 IIV NO PRSV INCREASED AG IM: CPT | Performed by: FAMILY MEDICINE

## 2024-09-25 PROCEDURE — 90677 PCV20 VACCINE IM: CPT | Performed by: FAMILY MEDICINE

## 2024-09-25 PROCEDURE — 1160F RVW MEDS BY RX/DR IN RCRD: CPT | Performed by: FAMILY MEDICINE

## 2024-09-25 PROCEDURE — 1158F ADVNC CARE PLAN TLK DOCD: CPT | Performed by: FAMILY MEDICINE

## 2024-09-25 RX ORDER — LIDOCAINE HYDROCHLORIDE 10 MG/ML
2 INJECTION, SOLUTION INFILTRATION; PERINEURAL
Status: COMPLETED | OUTPATIENT
Start: 2024-09-25 | End: 2024-09-25

## 2024-09-25 ASSESSMENT — ACTIVITIES OF DAILY LIVING (ADL)
TAKING_MEDICATION: INDEPENDENT
MANAGING_FINANCES: INDEPENDENT
DOING_HOUSEWORK: TOTAL CARE
GROCERY_SHOPPING: NEEDS ASSISTANCE
DRESSING: INDEPENDENT
BATHING: INDEPENDENT

## 2024-09-25 ASSESSMENT — ENCOUNTER SYMPTOMS
OCCASIONAL FEELINGS OF UNSTEADINESS: 1
DEPRESSION: 0
LOSS OF SENSATION IN FEET: 0

## 2024-09-25 ASSESSMENT — PAIN SCALES - GENERAL: PAINLEVEL: 0-NO PAIN

## 2024-09-25 NOTE — PROGRESS NOTES
Subjective   Trell Aguila is a 83 y.o. male who presents for Medicare Annual Wellness Visit Subsequent (Patient here for annual Medicare wellness visit today).    HPI  : Patient is a 83-year-old male who has multiple medical problems including diabetes, coronary heart disease, hypertension, polyarthritis, atrial fibrillation, and asthmatic bronchitis.  He is in today for his Medicare wellness exam and he will answer the questions as presented by the medical assistant.  He does have a living will and medical power of .  He also has very thin skin and suffered skin tears and bruising from his Coumadin.  He does go to the Coumadin clinic for following of his Coumadin INR.  Patient also will have his blood sugar and A1c rechecked today and he would like a flu shot and his Prevnar 20 vaccine.    Objective  : ROS :10 systems were reviewed and the information is included in the HPI and no additional review of systems is indicated.    Physical Exam  Vitals and nursing note reviewed.   Constitutional:       Appearance: Normal appearance. He is obese.      Comments: Patient is alert and oriented x3.   No acute distress and trying to watch his diet better to lose weight.   HENT:      Head: Normocephalic.      Right Ear: Tympanic membrane and external ear normal.      Left Ear: Tympanic membrane and external ear normal.      Ears:      Comments: Ears are patent bilaterally and TMs are clear.     Nose: Nose normal.      Mouth/Throat:      Mouth: Mucous membranes are moist.      Pharynx: Oropharynx is clear.      Comments: Mouth is moist, tongue is midline.  No posterior pharyngeal erythema.  Eyes:      Extraocular Movements: Extraocular movements intact.      Conjunctiva/sclera: Conjunctivae normal.      Pupils: Pupils are equal, round, and reactive to light.      Comments: No visual disturbance, does have his  eyes examined once a year.   Neck:      Comments: Restricted range of motion cervical spine due to  degenerative arthritis.  No carotid bruits, no thyromegaly, no cervical adenopathy.  Cardiovascular:      Rate and Rhythm: Normal rate and regular rhythm.      Pulses: Normal pulses.      Heart sounds: Normal heart sounds.      Comments: Patient denies chest pain and no palpitations.  Heart rhythm is stable S1 and S2 are noted, no ectopics.  Pulmonary:      Effort: Pulmonary effort is normal.      Breath sounds: Rhonchi present.      Comments: Lungs with few scattered rhonchi.  History of stable asthmatic bronchitis.  Denies shortness of breath.   Abdominal:      General: Bowel sounds are normal.      Palpations: Abdomen is soft.      Comments: Abdomen is soft and obese but non tender.  No flank tenderness.  No suprapubic pain.  Positive bowel sounds .  No abdominal guarding and no rebound tenderness.   Genitourinary:     Comments: Patient denies dysuria, no hematuria,  denies flank pain.  Nocturia.  Musculoskeletal:         General: Tenderness present. Normal range of motion.      Cervical back: Normal range of motion. Tenderness present.      Comments: Post lumbar fusion 3  1/2 years ago.  2020.  Left foot drop.   Arthritis in all his joints. Ambulates with a cane and unsteady.   Left elbow tendonitis.  Patient has had gel shots in his right knee for osteoarthritis.   Skin:     General: Skin is warm.      Findings: Bruising present.      Comments: Thin skin and bruising. On Coumadin.    Neurological:      Mental Status: He is alert and oriented to person, place, and time. Mental status is at baseline.      Sensory: Sensory deficit present.      Motor: Weakness present.      Coordination: Coordination abnormal.      Gait: Gait abnormal.      Comments: Left foot drop . Paresthesias lower extremities from lumbosacral disc dx. past history of sciatica and ambulates with a cane or a walker.  Coordination is unsteady and he has recently fallen in the shower.  Peripheral diabetic neuropathy and neuropathy from  lumbosacral disc disease.   Psychiatric:         Thought Content: Thought content normal.         Judgment: Judgment normal.      Comments: Patient is getting more forgetful with age.  He sometimes repeats himself.  Wife also states he has been having dreams at night and he wakes up and does not remember what he dropped.  He starts talking out loud in his sleep.     Patient ID: Trell Aguila is a 83 y.o. male.    Injection Upper Extremity: L elbow for lateral epicondylitis on 9/25/2024 4:22 PM  Indications: joint swelling, pain and therapeutic  Details: 25 G needle, radial approach  Medications: 10 mg triamcinolone acetonide 10 mg/mL; 2 mL lidocaine 10 mg/mL (1 %)    Patient had Betadine cleanse of the left elbow region.  He was injected with 10 mg Kenalog +2 mL lidocaine 1% under sterile technique into the left radial side of the elbow joint.  There were no problems or complications and patient tolerated the procedure well for treatment of lateral epicondylitis.  Procedure, treatment alternatives, risks and benefits explained, specific risks discussed. Consent was given by the patient. Immediately prior to procedure a time out was called to verify the correct patient, procedure, equipment, support staff and site/side marked as required. Patient was prepped and draped in the usual sterile fashion.       PLAN : Patient is an 83-year-old diabetic male who is in today for his annual Medicare wellness exam.  He did answer the questions as presented by the medical assistant.  He does have a living will and medical power of .  Patient also had his blood sugar and A1c rechecked today.  His sugar was 135 and his A1c was 8.0%.  He also follows closely with orthopedics and cardiology.  He usually goes once a month to have his Coumadin INR checked at the Coumadin clinic and that has been stable.  He does have bruising and very thin skin on his arms.  He also received his Prevnar 20 vaccine today and also his high-dose  flu shot.  Patient will follow-up as needed and he will try to watch his diabetes better and lose some weight.  Patient also had pain in the left elbow and will requested a cortisone shot.  As noted in the procedure note above he was given 10 mg Kenalog +2 mL lidocaine for lateral epicondylitis.  There were no problems or complications and he tolerated the procedure well.    Problem List Items Addressed This Visit       Diabetes mellitus without complication (Multi)    Relevant Orders    POCT fingerstick glucose manually resulted    POCT Glycosylated Hemoglobin (HGB A1C) docked device            Luis Newman DO

## 2024-09-26 NOTE — RESULT ENCOUNTER NOTE
Red and white blood cell counts are stable      kidney function is still slightly borderline      and that will be monitored   cholesterol is very good at 107      triglycerides are just slightly borderline at 151      thyroid function is normal       blood work is stable just continue to watch the diabetes 
167.64

## 2024-10-07 ENCOUNTER — ANTICOAGULATION - WARFARIN VISIT (OUTPATIENT)
Dept: PHARMACY | Facility: CLINIC | Age: 84
End: 2024-10-07
Payer: MEDICARE

## 2024-10-07 DIAGNOSIS — I48.0 PAROXYSMAL ATRIAL FIBRILLATION (MULTI): Primary | ICD-10-CM

## 2024-10-07 LAB
POC INR: 2.4
POC PROTHROMBIN TIME: NORMAL

## 2024-10-07 PROCEDURE — 99212 OFFICE O/P EST SF 10 MIN: CPT

## 2024-10-07 PROCEDURE — 85610 PROTHROMBIN TIME: CPT | Mod: QW

## 2024-10-22 ENCOUNTER — APPOINTMENT (OUTPATIENT)
Dept: PRIMARY CARE | Facility: CLINIC | Age: 84
End: 2024-10-22
Payer: MEDICARE

## 2024-10-22 VITALS
RESPIRATION RATE: 18 BRPM | OXYGEN SATURATION: 97 % | WEIGHT: 219 LBS | HEART RATE: 57 BPM | TEMPERATURE: 97.8 F | SYSTOLIC BLOOD PRESSURE: 122 MMHG | BODY MASS INDEX: 34.37 KG/M2 | HEIGHT: 67 IN | DIASTOLIC BLOOD PRESSURE: 57 MMHG

## 2024-10-22 DIAGNOSIS — E11.9 DIABETES MELLITUS WITHOUT COMPLICATION (MULTI): Primary | ICD-10-CM

## 2024-10-22 DIAGNOSIS — Z79.899 HIGH RISK MEDICATION USE: ICD-10-CM

## 2024-10-22 DIAGNOSIS — G62.9 NEUROPATHY: ICD-10-CM

## 2024-10-22 DIAGNOSIS — J45.40 MODERATE PERSISTENT ASTHMA WITHOUT COMPLICATION (HHS-HCC): ICD-10-CM

## 2024-10-22 DIAGNOSIS — I10 PRIMARY HYPERTENSION: ICD-10-CM

## 2024-10-22 DIAGNOSIS — E66.811 CLASS 1 OBESITY DUE TO EXCESS CALORIES WITHOUT SERIOUS COMORBIDITY WITH BODY MASS INDEX (BMI) OF 33.0 TO 33.9 IN ADULT: ICD-10-CM

## 2024-10-22 DIAGNOSIS — E11.65 UNCONTROLLED TYPE 2 DIABETES MELLITUS WITH HYPERGLYCEMIA: ICD-10-CM

## 2024-10-22 DIAGNOSIS — I25.118 CORONARY ARTERY DISEASE OF NATIVE ARTERY OF NATIVE HEART WITH STABLE ANGINA PECTORIS: ICD-10-CM

## 2024-10-22 DIAGNOSIS — E66.09 CLASS 1 OBESITY DUE TO EXCESS CALORIES WITHOUT SERIOUS COMORBIDITY WITH BODY MASS INDEX (BMI) OF 33.0 TO 33.9 IN ADULT: ICD-10-CM

## 2024-10-22 LAB
HBA1C MFR BLD: 7.8 % (ref 4.2–6.5)
POC FINGERSTICK BLOOD GLUCOSE: 129 MG/DL (ref 70–100)

## 2024-10-22 PROCEDURE — 83036 HEMOGLOBIN GLYCOSYLATED A1C: CPT | Mod: CLIA WAIVED TEST | Performed by: FAMILY MEDICINE

## 2024-10-22 PROCEDURE — 3074F SYST BP LT 130 MM HG: CPT | Performed by: FAMILY MEDICINE

## 2024-10-22 PROCEDURE — 1126F AMNT PAIN NOTED NONE PRSNT: CPT | Performed by: FAMILY MEDICINE

## 2024-10-22 PROCEDURE — 1159F MED LIST DOCD IN RCRD: CPT | Performed by: FAMILY MEDICINE

## 2024-10-22 PROCEDURE — 3078F DIAST BP <80 MM HG: CPT | Performed by: FAMILY MEDICINE

## 2024-10-22 PROCEDURE — 99214 OFFICE O/P EST MOD 30 MIN: CPT | Performed by: FAMILY MEDICINE

## 2024-10-22 PROCEDURE — 1123F ACP DISCUSS/DSCN MKR DOCD: CPT | Performed by: FAMILY MEDICINE

## 2024-10-22 PROCEDURE — 82962 GLUCOSE BLOOD TEST: CPT | Performed by: FAMILY MEDICINE

## 2024-10-22 PROCEDURE — 1036F TOBACCO NON-USER: CPT | Performed by: FAMILY MEDICINE

## 2024-10-22 ASSESSMENT — PAIN SCALES - GENERAL: PAINLEVEL_OUTOF10: 0-NO PAIN

## 2024-10-22 NOTE — PROGRESS NOTES
Subjective   Trell Aguila is a 84 y.o. male who presents for Follow-up (Follow up visit for diabetes).    HPI : Patient is an 84-year-old male who is in for a follow-up visit on blood pressure, diabetes and atrial fibrillation.  He needs his blood sugar and A1c rechecked and he has been trying to watch his diet.  He states that his asthma has caused him a cough recently with the weather change.  He needs to use his home nebulizer machine more frequently.  He also follows at the Coumadin clinic and he also sees cardiology on a regular basis.  Patient does get some skin sores which are probably from his medications the problem is he picks at the scab and then it starts to bleed since he is on Coumadin.  He has several bruises and skin tears on his arms.    Objective  : ROS :10 systems were reviewed and the information is included in the HPI and no additional review of systems is indicated.    Physical Exam  Vitals and nursing note reviewed.   Constitutional:       Appearance: Normal appearance. He is obese.      Comments: Patient is alert and oriented x3.   No acute distress, and  patient has been trying to watch his diet and lose weight.   HENT:      Head: Normocephalic.      Right Ear: Tympanic membrane and external ear normal.      Left Ear: Tympanic membrane and external ear normal.      Ears:      Comments: Ears are patent bilaterally and TMs are clear.     Nose: Nose normal.      Mouth/Throat:      Mouth: Mucous membranes are moist.      Pharynx: Oropharynx is clear.      Comments: Mouth is moist, tongue is midline.  No posterior pharyngeal erythema.  Eyes:      Extraocular Movements: Extraocular movements intact.      Conjunctiva/sclera: Conjunctivae normal.      Pupils: Pupils are equal, round, and reactive to light.      Comments: No visual disturbance, does have his  eyes examined once a year.   Neck:      Comments: Restricted range of motion   cervical  spine due to arthritis. No carotid bruits, no  thyromegaly, no cervical adenopathy.    Cardiovascular:      Rate and Rhythm: Normal rate. Rhythm irregular.      Pulses: Normal pulses.      Heart sounds: Normal heart sounds.      Comments: Patient denies chest pain . Chronic atrial fibrillation.     Heart S1 S2 with atrial fibrillation.   Pulmonary:      Effort: Pulmonary effort is normal.      Breath sounds: Rhonchi present.      Comments: Long history of asthma. Lungs with few scattered rhonchi.   Abdominal:      General: Bowel sounds are normal.      Palpations: Abdomen is soft.      Comments: Abdomen is soft and obese but  non tender.  No flank tenderness.  No suprapubic pain.  Positive bowel sounds.  No abdominal guarding and no rebound tenderness.   Genitourinary:     Comments: Patient denies dysuria, no hematuria, no nocturia, denies flank pain.  Musculoskeletal:         General: Swelling and tenderness present.      Cervical back: Tenderness present.      Comments: Right knee pain and swelling.  Age-related arthritis in the joints.  Had lumbar fusion 3  1/2 years ago.   Ambulates with a cane.   Arthritis in shoulders.  Patient has thought about having right knee replacement surgery but his wife is against it.  He still has a left foot drop and unsteady gait from the lumbar fusion.   Skin:     General: Skin is warm and dry.      Findings: Bruising and rash present.      Comments: Bruising from Coumadin.   Skin tears .  Frequent skin rashes from scratching his skin.   Neurological:      Mental Status: He is alert and oriented to person, place, and time. Mental status is at baseline.      Sensory: Sensory deficit present.      Motor: Weakness present.      Coordination: Coordination abnormal.      Gait: Gait abnormal.      Comments: Patient has unsteady coordination and unsteady  gait.    Left  foot drop.   Patient at base line . Peripheral Diabetic neuropathy.    Psychiatric:         Behavior: Behavior normal.         Thought Content: Thought content  normal.         Judgment: Judgment normal.      Comments: Patient has OCD behavior and worries about health conditions.  Thought content and judgment are stable.  Age-related forgetfulness.     PLAN : Patient was evaluated  for diabetes  , arthritis and asthmatic  bronchitis. His blood sugar was 129 and A1c improved to 7.8 %  .  He will start using his nebulizer at home to improve his asthmatic bronchitis.  He also will take Tylenol arthritis for his aches and pains.  Patient follows with cardiology for his atrial fibrillation and continues to go to the Coumadin clinic to monitor his Coumadin.  He otherwise is stable we will follow-up as needed.    Problem List Items Addressed This Visit       Diabetes mellitus without complication (Multi)            Luis Newman, DO

## 2024-10-24 DIAGNOSIS — E11.9 DIABETES MELLITUS WITHOUT COMPLICATION (MULTI): ICD-10-CM

## 2024-10-25 RX ORDER — BLOOD SUGAR DIAGNOSTIC
STRIP MISCELLANEOUS 2 TIMES DAILY
Qty: 200 STRIP | Refills: 3 | Status: SHIPPED | OUTPATIENT
Start: 2024-10-25

## 2024-10-31 DIAGNOSIS — E11.9 DIABETES MELLITUS WITHOUT COMPLICATION (MULTI): ICD-10-CM

## 2024-10-31 RX ORDER — LANCETS
EACH MISCELLANEOUS
Qty: 200 EACH | Refills: 3 | Status: SHIPPED | OUTPATIENT
Start: 2024-10-31

## 2024-11-04 ENCOUNTER — OFFICE VISIT (OUTPATIENT)
Dept: PRIMARY CARE | Facility: CLINIC | Age: 84
End: 2024-11-04
Payer: MEDICARE

## 2024-11-04 ENCOUNTER — TELEPHONE (OUTPATIENT)
Dept: PHARMACY | Facility: CLINIC | Age: 84
End: 2024-11-04

## 2024-11-04 VITALS
TEMPERATURE: 97.9 F | HEIGHT: 67 IN | RESPIRATION RATE: 18 BRPM | HEART RATE: 83 BPM | BODY MASS INDEX: 34.37 KG/M2 | WEIGHT: 219 LBS | DIASTOLIC BLOOD PRESSURE: 64 MMHG | SYSTOLIC BLOOD PRESSURE: 121 MMHG | OXYGEN SATURATION: 95 %

## 2024-11-04 DIAGNOSIS — B37.9 YEAST INFECTION: Primary | ICD-10-CM

## 2024-11-04 DIAGNOSIS — E11.9 DIABETES MELLITUS WITHOUT COMPLICATION (MULTI): ICD-10-CM

## 2024-11-04 DIAGNOSIS — F41.1 GENERALIZED ANXIETY DISORDER: ICD-10-CM

## 2024-11-04 DIAGNOSIS — L98.491 SKIN ULCERATION, LIMITED TO BREAKDOWN OF SKIN: ICD-10-CM

## 2024-11-04 DIAGNOSIS — E11.42 DIABETIC POLYNEUROPATHY ASSOCIATED WITH TYPE 2 DIABETES MELLITUS (MULTI): ICD-10-CM

## 2024-11-04 DIAGNOSIS — Z79.899 HIGH RISK MEDICATION USE: ICD-10-CM

## 2024-11-04 PROCEDURE — 1160F RVW MEDS BY RX/DR IN RCRD: CPT | Performed by: FAMILY MEDICINE

## 2024-11-04 PROCEDURE — 3078F DIAST BP <80 MM HG: CPT | Performed by: FAMILY MEDICINE

## 2024-11-04 PROCEDURE — 99214 OFFICE O/P EST MOD 30 MIN: CPT | Performed by: FAMILY MEDICINE

## 2024-11-04 PROCEDURE — 1036F TOBACCO NON-USER: CPT | Performed by: FAMILY MEDICINE

## 2024-11-04 PROCEDURE — 1123F ACP DISCUSS/DSCN MKR DOCD: CPT | Performed by: FAMILY MEDICINE

## 2024-11-04 PROCEDURE — 1159F MED LIST DOCD IN RCRD: CPT | Performed by: FAMILY MEDICINE

## 2024-11-04 PROCEDURE — 3074F SYST BP LT 130 MM HG: CPT | Performed by: FAMILY MEDICINE

## 2024-11-04 PROCEDURE — 1126F AMNT PAIN NOTED NONE PRSNT: CPT | Performed by: FAMILY MEDICINE

## 2024-11-04 RX ORDER — FLUCONAZOLE 100 MG/1
100 TABLET ORAL DAILY
Qty: 10 TABLET | Refills: 0 | Status: SHIPPED | OUTPATIENT
Start: 2024-11-04 | End: 2024-11-14

## 2024-11-04 RX ORDER — NYSTATIN 100000 [USP'U]/G
1 POWDER TOPICAL 2 TIMES DAILY
Qty: 60 G | Refills: 3 | Status: SHIPPED | OUTPATIENT
Start: 2024-11-04 | End: 2025-11-04

## 2024-11-04 ASSESSMENT — PAIN SCALES - GENERAL: PAINLEVEL_OUTOF10: 0-NO PAIN

## 2024-11-04 NOTE — PROGRESS NOTES
Subjective   Trell Aguila is a 84 y.o. male who presents for Sick Visit (Patient here with complaint of redness and swelling in the groin area).    HPI  : Patient is in with severe right groin rash  and skin irritation.  Patient is diabetic and states his sugar has been stable. He also goes to the Coumadin clinic  for recheck on Warfarin.  The right groin rash started 3 days ago and is extremely red and irritated and affects his ambulation.  It is a yeast infection and will need to be treated with oral Diflucan and topical powder.  I also explained to him that the Diflucan might affect his Coumadin level and he should notify the Coumadin clinic.    Objective   : ROS : 10 systems were reviewed and the information is included in the HPI and no additional review of systems is indicated.    Physical Exam  Vitals and nursing note reviewed.   Constitutional:       Appearance: Normal appearance. He is obese.      Comments: Patient is alert and oriented x3.   No acute distress   HENT:      Head: Normocephalic.      Right Ear: Tympanic membrane and external ear normal.      Left Ear: Tympanic membrane and external ear normal.      Ears:      Comments: Ears are patent bilaterally and TMs are clear.     Nose: Nose normal.      Mouth/Throat:      Mouth: Mucous membranes are moist.      Pharynx: Oropharynx is clear.      Comments: Mouth is moist, tongue is midline.  No posterior pharyngeal erythema.  Eyes:      Extraocular Movements: Extraocular movements intact.      Conjunctiva/sclera: Conjunctivae normal.      Pupils: Pupils are equal, round, and reactive to light.      Comments: No visual disturbance, does have his  eyes examined once a year.   Neck:      Comments: No carotid bruits, no thyromegaly, no cervical adenopathy.  Occasional neck spasm and restriction of motion secondary to stress and tension.  Cardiovascular:      Rate and Rhythm: Normal rate and regular rhythm.      Pulses: Normal pulses.      Heart sounds:  Normal heart sounds.      Comments: Patient is on Coumadin and follows with cardiology and the Coumadin clinic.  Paroxysmal atrial fibrillation.  Patient denies chest pain and no palpitations.  Heart rhythm is stable S1 and S2 are noted.   Pulmonary:      Effort: Pulmonary effort is normal.      Comments: Long history of asthma but has no wheezing today.  Lungs with few scattered rhonchi to auscultation.    Abdominal:      General: Bowel sounds are normal.      Palpations: Abdomen is soft.      Comments: Soft and obese and difficult to evaluate.  Does have a severe right groin rash consistent with a yeast infection.   Genitourinary:     Comments: Patient denies dysuria, no hematuria, no nocturia, denies flank pain.  Musculoskeletal:         General: Normal range of motion.      Cervical back: Normal range of motion.      Comments: Patient had a spinal fusion 3 years ago and has been stable other than a left foot drop which affects his balance and ambulation.  He also has osteoarthritis of the hips and knees.  He has had gel shots and cortisone shots in his right knee.     Skin:     General: Skin is warm.      Findings: Rash present.      Comments: Severe right groin rash which is erythematous and the skin actually looks burnt.  He will need to keep the skin  and use Mycostatin powder and also take Diflucan.   Neurological:      General: No focal deficit present.      Mental Status: He is alert and oriented to person, place, and time. Mental status is at baseline.      Sensory: Sensory deficit present.      Motor: Weakness present.      Coordination: Coordination abnormal.      Gait: Gait abnormal.      Comments: Patient is at baseline and has a left foot drop unstable gait and unstable coordination.  Some lower extremity weakness with abnormal deep tendon reflexes.  Ambulates with a cane to keep his balance.   Psychiatric:         Behavior: Behavior normal.         Thought Content: Thought content normal.          Judgment: Judgment normal.      Comments: Patient has OCD behavior and is always worried about his health problems..  Thought content and judgment are stable.  Getting more forgetful with age.     PLAN : Patient is a 84-year-old male who has multiple medical problems and came in today without an appointment due to severe right groin rash.  It is a severe yeast infection and I did clean it with Betadine and applied a sterile gauze padding to separate the skin.  He will have to do the same at home and apply Mycostatin powder and take Diflucan 100 mg daily.  Patient will let the Coumadin clinic know that he is on Diflucan since it can affect the Coumadin level and patient will let me know in 2 days if it has improved.  His wife will also assist him with cleaning the area and he should not wear jockey shorts but needs to wear loosefitting clothing.  It almost looks  like a skin burn.    Problem List Items Addressed This Visit       Yeast infection - Primary    Relevant Medications    nystatin (Mycostatin) 100,000 unit/gram powder    fluconazole (Diflucan) 100 mg tablet            Luis Newman,

## 2024-11-04 NOTE — TELEPHONE ENCOUNTER
Spoke with patient - today he is starting Fluconazole 100mg daily for 10 days and Nystatin powder. INR check rescheduled in 4 days.

## 2024-11-08 ENCOUNTER — ANTICOAGULATION - WARFARIN VISIT (OUTPATIENT)
Dept: PHARMACY | Facility: CLINIC | Age: 84
End: 2024-11-08
Payer: MEDICARE

## 2024-11-08 DIAGNOSIS — I48.91 ATRIAL FIBRILLATION, UNSPECIFIED TYPE (MULTI): Primary | ICD-10-CM

## 2024-11-08 LAB
POC INR: 3.7
POC PROTHROMBIN TIME: NORMAL

## 2024-11-08 PROCEDURE — 85610 PROTHROMBIN TIME: CPT | Mod: QW | Performed by: PHARMACIST

## 2024-11-08 PROCEDURE — 99212 OFFICE O/P EST SF 10 MIN: CPT | Performed by: PHARMACIST

## 2024-11-08 NOTE — PROGRESS NOTES
Trell Aguila is a 84 y.o. male with history of atrial fibrillation who presents today for anticoagulation monitoring and adjustment.  INR 3.7 is supra-therapeutic for this patient (goal range 2-3) and is reflective of 22.5 mg TWD  Patient verifies current dosing regimen, patient able to verbally recall dose  Patient reports 0 missed doses since last INR  Last INR 2.4 on 10/7/24 (5 week interval)  Patient denies s/sx clotting and/or stroke  Patient denies hematuria, epistaxis, rectal bleeding  Patient denies changes in diet, alcohol, or tobacco use  Vegetable intake consistent from week to week    Reviewed medication list and drug allergies with patient, updated any medication additions or modifications accordingly -- patient started fluconazole 100mg PO daily for 10 days on Tuesday, 11/4/24 and Nystatin powder     Acetaminophen intake: no changes   Patient also denies any pending medical or dental procedures scheduled at this time  Patient was instructed to HOLD warfarin today and tomorrow only,  and RTC 2 weeks    Rebeca MeyreD, BCPS   11/8/2024 11:12 AM

## 2024-11-11 ENCOUNTER — APPOINTMENT (OUTPATIENT)
Dept: PHARMACY | Facility: CLINIC | Age: 84
End: 2024-11-11
Payer: MEDICARE

## 2024-11-14 DIAGNOSIS — E11.9 DIABETES MELLITUS WITHOUT COMPLICATION (MULTI): Primary | ICD-10-CM

## 2024-11-14 RX ORDER — SITAGLIPTIN AND METFORMIN HYDROCHLORIDE 1000; 50 MG/1; MG/1
1 TABLET, FILM COATED ORAL 2 TIMES DAILY
Qty: 180 TABLET | Refills: 2 | Status: SHIPPED | OUTPATIENT
Start: 2024-11-14

## 2024-11-20 ENCOUNTER — APPOINTMENT (OUTPATIENT)
Dept: PRIMARY CARE | Facility: CLINIC | Age: 84
End: 2024-11-20
Payer: MEDICARE

## 2024-11-20 VITALS
RESPIRATION RATE: 18 BRPM | HEART RATE: 63 BPM | WEIGHT: 216 LBS | HEIGHT: 67 IN | BODY MASS INDEX: 33.9 KG/M2 | DIASTOLIC BLOOD PRESSURE: 60 MMHG | SYSTOLIC BLOOD PRESSURE: 113 MMHG | TEMPERATURE: 97.9 F | OXYGEN SATURATION: 95 %

## 2024-11-20 DIAGNOSIS — R35.1 BENIGN PROSTATIC HYPERPLASIA WITH NOCTURIA: ICD-10-CM

## 2024-11-20 DIAGNOSIS — M21.372 LEFT FOOT DROP: ICD-10-CM

## 2024-11-20 DIAGNOSIS — E66.811 CLASS 1 OBESITY DUE TO EXCESS CALORIES WITHOUT SERIOUS COMORBIDITY WITH BODY MASS INDEX (BMI) OF 33.0 TO 33.9 IN ADULT: ICD-10-CM

## 2024-11-20 DIAGNOSIS — Z79.01 ANTICOAGULATED ON COUMADIN: ICD-10-CM

## 2024-11-20 DIAGNOSIS — N40.1 BENIGN PROSTATIC HYPERPLASIA WITH NOCTURIA: ICD-10-CM

## 2024-11-20 DIAGNOSIS — E66.09 CLASS 1 OBESITY DUE TO EXCESS CALORIES WITHOUT SERIOUS COMORBIDITY WITH BODY MASS INDEX (BMI) OF 33.0 TO 33.9 IN ADULT: ICD-10-CM

## 2024-11-20 DIAGNOSIS — M25.461 EFFUSION OF RIGHT KNEE: ICD-10-CM
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
## 2024-11-20 DIAGNOSIS — G62.9 NEUROPATHY: ICD-10-CM

## 2024-11-20 DIAGNOSIS — E11.65 UNCONTROLLED TYPE 2 DIABETES MELLITUS WITH HYPERGLYCEMIA: ICD-10-CM

## 2024-11-20 DIAGNOSIS — S80.01XA TRAUMATIC ECCHYMOSIS OF RIGHT KNEE, INITIAL ENCOUNTER: ICD-10-CM

## 2024-11-20 DIAGNOSIS — E11.9 DIABETES MELLITUS WITHOUT COMPLICATION (MULTI): ICD-10-CM

## 2024-11-20 DIAGNOSIS — M21.371 FOOT DROP, RIGHT: ICD-10-CM

## 2024-11-20 LAB
HBA1C MFR BLD: 7.9 % (ref 4.2–6.5)
POC FINGERSTICK BLOOD GLUCOSE: 174 MG/DL (ref 70–100)

## 2024-11-20 PROCEDURE — 1126F AMNT PAIN NOTED NONE PRSNT: CPT | Performed by: FAMILY MEDICINE

## 2024-11-20 PROCEDURE — 83036 HEMOGLOBIN GLYCOSYLATED A1C: CPT | Mod: CLIA WAIVED TEST | Performed by: FAMILY MEDICINE

## 2024-11-20 PROCEDURE — 3078F DIAST BP <80 MM HG: CPT | Performed by: FAMILY MEDICINE

## 2024-11-20 PROCEDURE — 3074F SYST BP LT 130 MM HG: CPT | Performed by: FAMILY MEDICINE

## 2024-11-20 PROCEDURE — 1160F RVW MEDS BY RX/DR IN RCRD: CPT | Performed by: FAMILY MEDICINE

## 2024-11-20 PROCEDURE — 1123F ACP DISCUSS/DSCN MKR DOCD: CPT | Performed by: FAMILY MEDICINE

## 2024-11-20 PROCEDURE — 1159F MED LIST DOCD IN RCRD: CPT | Performed by: FAMILY MEDICINE

## 2024-11-20 PROCEDURE — 1036F TOBACCO NON-USER: CPT | Performed by: FAMILY MEDICINE

## 2024-11-20 PROCEDURE — 99214 OFFICE O/P EST MOD 30 MIN: CPT | Performed by: FAMILY MEDICINE

## 2024-11-20 PROCEDURE — 82962 GLUCOSE BLOOD TEST: CPT | Performed by: FAMILY MEDICINE

## 2024-11-20 ASSESSMENT — PAIN SCALES - GENERAL: PAINLEVEL_OUTOF10: 0-NO PAIN

## 2024-11-20 ASSESSMENT — PATIENT HEALTH QUESTIONNAIRE - PHQ9
2. FEELING DOWN, DEPRESSED OR HOPELESS: NOT AT ALL
SUM OF ALL RESPONSES TO PHQ9 QUESTIONS 1 AND 2: 0
SUM OF ALL RESPONSES TO PHQ9 QUESTIONS 1 AND 2: 0
2. FEELING DOWN, DEPRESSED OR HOPELESS: NOT AT ALL
1. LITTLE INTEREST OR PLEASURE IN DOING THINGS: NOT AT ALL
1. LITTLE INTEREST OR PLEASURE IN DOING THINGS: NOT AT ALL

## 2024-11-20 NOTE — PROGRESS NOTES
Subjective   Trell Aguila is a 84 y.o. male who presents for Follow-up (Follow up visit for diabetes).    HPI : Patient is  an 84  year old  male  in for Diabetes recheck and  review of medication . Patient fell in garage and bruised right knee.  Doesn't want X rays .  Patient has a lot of anxiety and OCD behavior.  His wife is with him and she tries to get him to walk more and increase his activity but he tends to sit in a chair and does not want to get up and ambulate.  This creates weakness in his legs and he does not have the strength he used to have and he requires a cane or a walker.  Patient also follows with cardiology and orthopedics.    Objective  : ROS :10 systems were reviewed and the information is included in the HPI and no additional review of systems is indicated.    Physical Exam  Vitals and nursing note reviewed.   Constitutional:       Appearance: Normal appearance. He is obese.      Comments: Patient is alert and oriented x3.   No acute distress   HENT:      Head: Normocephalic.      Right Ear: Tympanic membrane and external ear normal.      Left Ear: Tympanic membrane and external ear normal.      Ears:      Comments: Ears are patent bilaterally and TMs are clear.     Nose: Nose normal.      Mouth/Throat:      Mouth: Mucous membranes are moist.      Pharynx: Oropharynx is clear.      Comments: Mouth is moist, tongue is midline.  No posterior pharyngeal erythema.  Eyes:      Extraocular Movements: Extraocular movements intact.      Conjunctiva/sclera: Conjunctivae normal.      Pupils: Pupils are equal, round, and reactive to light.      Comments: No visual disturbance, does have his  eyes examined once a year.   Neck:      Comments: No carotid bruits, no thyromegaly, no cervical adenopathy.  Occasional neck spasm and restriction of motion secondary to stress and tension.  Cardiovascular:      Rate and Rhythm: Normal rate and regular rhythm.      Pulses: Normal pulses.      Heart sounds: Normal  heart sounds.      Comments: Patient does follow with cardiology and had previous stents placed.  He also has paroxysmal atrial fibrillation , and is on Coumadin.  He follows at the Coumadin clinic.  Patient denies chest pain and no palpitations.  Heart rhythm is stable S1 and S2 are noted.  Pulmonary:      Effort: Pulmonary effort is normal.      Comments: Patient denies any coughing or wheezing.  Lungs are clear to auscultation.    Abdominal:      General: Bowel sounds are normal.      Palpations: Abdomen is soft.      Comments: Abdomen is soft and obese and non tender. .  No flank tenderness.  No suprapubic pain.    No abdominal guarding and no rebound tenderness.   Genitourinary:     Comments: Patient denies dysuria, no hematuria, no nocturia, denies flank pain.  Musculoskeletal:         General: Swelling and tenderness present. Normal range of motion.      Cervical back: Normal range of motion.      Comments: Patient had fallen in his garage 2 weeks ago and bruised his right knee.  Resolving ecchymosis.  Patient does not really want to go for x-rays at this time and he did not go to the emergency room.  He needs to increase his activity and ambulate more to strengthen his legs.  He is also waiting 3 more months until he can have gel shots in his right knee from orthopedics.  Patient had a lumbar fusion 3 years ago.  Also has bilateral foot drop with peripheral Neuropathy.    Skin:     General: Skin is warm.      Findings: Rash present.      Comments: Skin rash on arms  from thin skin and Coumadin.  Right groin rash  improving.    Neurological:      General: No focal deficit present.      Mental Status: He is alert and oriented to person, place, and time. Mental status is at baseline.      Comments: Patient takes gabapentin for peripheral neuropathy.  Coordination and gait are unsteady and he must use a cane or a walker.  Patient does have weakness in his legs and needs to ambulate more and increase his  activity.  Also has bilateral foot drop and did have previous lumbar fusion.   Psychiatric:         Behavior: Behavior normal.         Thought Content: Thought content normal.         Judgment: Judgment normal.      Comments: Patient has anxious  mood and affect.  OCD behavior and anxiety about health issues. Thought content and judgment are stable.  Age-related forgetfulness and does repeat himself.  I encouraged him to increase his activity and ambulation to strengthen his legs.     PLAN : Patient had fallen 2 weeks ago and did bruise his right knee.  He was encouraged to ice it and apply some lidocaine or Voltaren gel.  He also needs to increase his ambulation and strengthen his legs instead of sitting in a chair all day long which his wife complains about.  He has to be cautious with his balance due to foot drop and he needs to use his cane or walker at all times for balance.  Today his blood sugar was 174 and his A1c was 7.9%.  He did not want to go for x-rays at his right knee but is waiting for his next appointment with orthopedics so he can have gel shots.  Patient will follow-up as needed after the holidays.    Problem List Items Addressed This Visit       Diabetes mellitus without complication (Multi)    Relevant Orders    POCT fingerstick glucose manually resulted (Completed)    POCT Glycosylated Hemoglobin (HGB A1C) docked device            Luis Newman DO

## 2024-11-25 ENCOUNTER — ANTICOAGULATION - WARFARIN VISIT (OUTPATIENT)
Dept: PHARMACY | Facility: CLINIC | Age: 84
End: 2024-11-25
Payer: MEDICARE

## 2024-11-25 DIAGNOSIS — I48.91 ATRIAL FIBRILLATION, UNSPECIFIED TYPE (MULTI): Primary | ICD-10-CM

## 2024-11-25 LAB
POC INR: 5.9
POC PROTHROMBIN TIME: NORMAL

## 2024-11-25 PROCEDURE — 99212 OFFICE O/P EST SF 10 MIN: CPT

## 2024-11-25 PROCEDURE — 85610 PROTHROMBIN TIME: CPT | Mod: QW

## 2024-11-25 NOTE — PROGRESS NOTES
No bleeding or unusual bruising.  Medications and doses verified.  No scheduled procedures at this time.  INR=5.9  No new medications.  Pt admits to having some diarrhea lately.  Plan: Hold 3 doses then continue same weekly dose.  Follow up INR check in 1 week.

## 2024-12-03 ENCOUNTER — ANTICOAGULATION - WARFARIN VISIT (OUTPATIENT)
Dept: PHARMACY | Facility: CLINIC | Age: 84
End: 2024-12-03
Payer: MEDICARE

## 2024-12-03 DIAGNOSIS — I48.91 ATRIAL FIBRILLATION, UNSPECIFIED TYPE (MULTI): Primary | ICD-10-CM

## 2024-12-03 LAB
POC INR: 2.6
POC PROTHROMBIN TIME: NORMAL

## 2024-12-03 PROCEDURE — 85610 PROTHROMBIN TIME: CPT | Mod: QW

## 2024-12-03 PROCEDURE — 99212 OFFICE O/P EST SF 10 MIN: CPT

## 2024-12-03 NOTE — PROGRESS NOTES
Coumadin Clinic Visit Note    Patient verified warfarin dose  No missed doses  No unusual bruising or bleeding, he was here last week with INR 5.9 and stopped for 3 days   No changes to medications, patient was on fluconazole 100 mg for 10 days 2.5 weeks ago so probably why INR was high last 2 visits , usually in range otherwise , so will keep same dose  and return in 2 -3 weeks to make sure it do not go up again , if in range then we can increase interval   Consistent dietary green intake  No anticipated procedures at this time  INR Therapeutic today at 2.6  No changes to warfarin dose today  Next appointment 2 weeks      Alesha Steen, PharmD

## 2024-12-11 ENCOUNTER — APPOINTMENT (OUTPATIENT)
Dept: PRIMARY CARE | Facility: CLINIC | Age: 84
End: 2024-12-11
Payer: MEDICARE

## 2024-12-12 ENCOUNTER — APPOINTMENT (OUTPATIENT)
Dept: PRIMARY CARE | Facility: CLINIC | Age: 84
End: 2024-12-12
Payer: MEDICARE

## 2024-12-16 ENCOUNTER — APPOINTMENT (OUTPATIENT)
Dept: PRIMARY CARE | Facility: CLINIC | Age: 84
End: 2024-12-16
Payer: MEDICARE

## 2024-12-16 ENCOUNTER — ANTICOAGULATION - WARFARIN VISIT (OUTPATIENT)
Dept: PHARMACY | Facility: CLINIC | Age: 84
End: 2024-12-16
Payer: MEDICARE

## 2024-12-16 VITALS
DIASTOLIC BLOOD PRESSURE: 51 MMHG | TEMPERATURE: 98.7 F | SYSTOLIC BLOOD PRESSURE: 109 MMHG | OXYGEN SATURATION: 95 % | BODY MASS INDEX: 33.9 KG/M2 | HEART RATE: 76 BPM | RESPIRATION RATE: 18 BRPM | HEIGHT: 67 IN | WEIGHT: 216 LBS

## 2024-12-16 DIAGNOSIS — Z79.01 ANTICOAGULATED ON COUMADIN: ICD-10-CM

## 2024-12-16 DIAGNOSIS — R53.81 MALAISE AND FATIGUE: ICD-10-CM

## 2024-12-16 DIAGNOSIS — E11.9 DIABETES MELLITUS WITHOUT COMPLICATION (MULTI): ICD-10-CM

## 2024-12-16 DIAGNOSIS — I10 PRIMARY HYPERTENSION: ICD-10-CM

## 2024-12-16 DIAGNOSIS — I48.91 ATRIAL FIBRILLATION, UNSPECIFIED TYPE (MULTI): Primary | ICD-10-CM

## 2024-12-16 DIAGNOSIS — E78.5 DYSLIPIDEMIA: ICD-10-CM

## 2024-12-16 DIAGNOSIS — R53.83 MALAISE AND FATIGUE: ICD-10-CM

## 2024-12-16 DIAGNOSIS — Z12.5 SCREENING PSA (PROSTATE SPECIFIC ANTIGEN): ICD-10-CM

## 2024-12-16 DIAGNOSIS — E55.9 VITAMIN D DEFICIENCY: ICD-10-CM

## 2024-12-16 DIAGNOSIS — E11.65 UNCONTROLLED TYPE 2 DIABETES MELLITUS WITH HYPERGLYCEMIA: Primary | ICD-10-CM

## 2024-12-16 DIAGNOSIS — I50.22 CHF (CONGESTIVE HEART FAILURE), NYHA CLASS II, CHRONIC, SYSTOLIC: ICD-10-CM

## 2024-12-16 LAB
25(OH)D3 SERPL-MCNC: 79 NG/ML (ref 30–100)
ALBUMIN SERPL BCP-MCNC: 4.1 G/DL (ref 3.4–5)
ALP SERPL-CCNC: 53 U/L (ref 33–136)
ALT SERPL W P-5'-P-CCNC: 16 U/L (ref 10–52)
ANION GAP SERPL CALC-SCNC: 13 MMOL/L (ref 10–20)
AST SERPL W P-5'-P-CCNC: 16 U/L (ref 9–39)
BILIRUB SERPL-MCNC: 0.8 MG/DL (ref 0–1.2)
BUN SERPL-MCNC: 18 MG/DL (ref 6–23)
CALCIUM SERPL-MCNC: 8.6 MG/DL (ref 8.6–10.6)
CHLORIDE SERPL-SCNC: 105 MMOL/L (ref 98–107)
CHOLEST SERPL-MCNC: 105 MG/DL (ref 0–199)
CHOLESTEROL/HDL RATIO: 3.3
CO2 SERPL-SCNC: 28 MMOL/L (ref 21–32)
CREAT SERPL-MCNC: 1.26 MG/DL (ref 0.5–1.3)
EGFRCR SERPLBLD CKD-EPI 2021: 56 ML/MIN/1.73M*2
ERYTHROCYTE [DISTWIDTH] IN BLOOD BY AUTOMATED COUNT: 15.1 % (ref 11.5–14.5)
GLUCOSE SERPL-MCNC: 144 MG/DL (ref 74–99)
HBA1C MFR BLD: 8.3 % (ref 4.2–6.5)
HCT VFR BLD AUTO: 37.9 % (ref 41–52)
HDLC SERPL-MCNC: 31.8 MG/DL
HGB BLD-MCNC: 12 G/DL (ref 13.5–17.5)
LDLC SERPL CALC-MCNC: 44 MG/DL
MCH RBC QN AUTO: 27.3 PG (ref 26–34)
MCHC RBC AUTO-ENTMCNC: 31.7 G/DL (ref 32–36)
MCV RBC AUTO: 86 FL (ref 80–100)
NON HDL CHOLESTEROL: 73 MG/DL (ref 0–149)
NRBC BLD-RTO: 0 /100 WBCS (ref 0–0)
PLATELET # BLD AUTO: 129 X10*3/UL (ref 150–450)
POC FINGERSTICK BLOOD GLUCOSE: 180 MG/DL (ref 70–100)
POC INR: 3.6
POC PROTHROMBIN TIME: NORMAL
POTASSIUM SERPL-SCNC: 4.1 MMOL/L (ref 3.5–5.3)
PROT SERPL-MCNC: 6.4 G/DL (ref 6.4–8.2)
PSA SERPL-MCNC: 1.94 NG/ML
RBC # BLD AUTO: 4.4 X10*6/UL (ref 4.5–5.9)
SODIUM SERPL-SCNC: 142 MMOL/L (ref 136–145)
TRIGL SERPL-MCNC: 148 MG/DL (ref 0–149)
TSH SERPL-ACNC: 2 MIU/L (ref 0.44–3.98)
VLDL: 30 MG/DL (ref 0–40)
WBC # BLD AUTO: 6.5 X10*3/UL (ref 4.4–11.3)

## 2024-12-16 PROCEDURE — 3074F SYST BP LT 130 MM HG: CPT | Performed by: FAMILY MEDICINE

## 2024-12-16 PROCEDURE — 1036F TOBACCO NON-USER: CPT | Performed by: FAMILY MEDICINE

## 2024-12-16 PROCEDURE — 85610 PROTHROMBIN TIME: CPT | Mod: QW

## 2024-12-16 PROCEDURE — G0103 PSA SCREENING: HCPCS

## 2024-12-16 PROCEDURE — 3078F DIAST BP <80 MM HG: CPT | Performed by: FAMILY MEDICINE

## 2024-12-16 PROCEDURE — 82962 GLUCOSE BLOOD TEST: CPT | Performed by: FAMILY MEDICINE

## 2024-12-16 PROCEDURE — 85027 COMPLETE CBC AUTOMATED: CPT

## 2024-12-16 PROCEDURE — 82306 VITAMIN D 25 HYDROXY: CPT

## 2024-12-16 PROCEDURE — 1123F ACP DISCUSS/DSCN MKR DOCD: CPT | Performed by: FAMILY MEDICINE

## 2024-12-16 PROCEDURE — 80061 LIPID PANEL: CPT

## 2024-12-16 PROCEDURE — 1160F RVW MEDS BY RX/DR IN RCRD: CPT | Performed by: FAMILY MEDICINE

## 2024-12-16 PROCEDURE — 83036 HEMOGLOBIN GLYCOSYLATED A1C: CPT | Mod: CLIA WAIVED TEST | Performed by: FAMILY MEDICINE

## 2024-12-16 PROCEDURE — 99214 OFFICE O/P EST MOD 30 MIN: CPT | Performed by: FAMILY MEDICINE

## 2024-12-16 PROCEDURE — 1159F MED LIST DOCD IN RCRD: CPT | Performed by: FAMILY MEDICINE

## 2024-12-16 PROCEDURE — 80053 COMPREHEN METABOLIC PANEL: CPT

## 2024-12-16 PROCEDURE — 84443 ASSAY THYROID STIM HORMONE: CPT

## 2024-12-16 PROCEDURE — 99212 OFFICE O/P EST SF 10 MIN: CPT

## 2024-12-16 PROCEDURE — 1126F AMNT PAIN NOTED NONE PRSNT: CPT | Performed by: FAMILY MEDICINE

## 2024-12-16 ASSESSMENT — PAIN SCALES - GENERAL: PAINLEVEL_OUTOF10: 0-NO PAIN

## 2024-12-16 NOTE — PROGRESS NOTES
Subjective   Trell Aguila is a 84 y.o. male who presents for Follow-up (Follow up visit for diabetes).    HPI  : Patient is an 84-year-old diabetic male who has multiple medical problems and is in for a follow-up visit, review of medication and recheck of blood work and blood sugar with A1c.  He has an upcoming cardiology appointment and they would like all his blood rechecked.  Patient also states that he had fallen about a week ago on some slippery ice outdoors but did not injure himself but did have trouble getting up on his feet and needed assistance.  I told him he may need to have to ambulate with his walker or use 2 canes at 1 time.  Patient is in the office today with his wife and he does have multiple issues and complaints with problems that are age-related.  He had lumbar spinal surgery for fusion of all 5 levels approximately 3 years ago and he still has unsteady gait and bilateral foot drop.  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  He is waiting for approval for gel shots.      Objective   :  ROS : 10 systems were reviewed and the information is included in the HPI and no additional review of systems is indicated.    Physical Exam  Vitals and nursing note reviewed.   Constitutional:       Appearance: Normal appearance. He is obese.      Comments: Patient is alert and oriented x3.   No acute distress   HENT:      Head: Normocephalic.      Right Ear: Tympanic membrane and external ear normal.      Left Ear: Tympanic membrane and external ear normal.      Ears:      Comments: Ears are patent bilaterally and TMs are clear.     Nose: Nose normal.      Mouth/Throat:      Mouth: Mucous membranes are moist.      Pharynx: Oropharynx is clear.      Comments: Mouth is moist, tongue is midline.  No posterior pharyngeal erythema.  Eyes:      Extraocular Movements: Extraocular movements intact.      Conjunctiva/sclera: Conjunctivae normal.      Pupils: Pupils  are equal, round, and reactive to light.      Comments: No visual disturbance, does have his  eyes examined once a year.   Neck:      Comments: No carotid bruits, no thyromegaly, no cervical adenopathy.  Restricted range of motion due to arthritis and spasm.    Cardiovascular:      Rate and Rhythm: Normal rate and regular rhythm.      Pulses: Normal pulses.      Heart sounds: Normal heart sounds.      Comments: Follows with Cardiology for atrial fibrillation and previous stent placements years ago.  Needs all his blood work checked today for cardiology upcoming appointment.  Heart rhythm is stable today S1 and S2 are noted.  Patient does have intermittent atrial fibrillation.   Pulmonary:      Effort: Pulmonary effort is normal.      Breath sounds: Normal breath sounds.      Comments: Patient denies any coughing or wheezing.  Lungs are clear to auscultation.    Abdominal:      General: Bowel sounds are normal.      Palpations: Abdomen is soft.      Comments: Abdomen is soft and obese.  Difficultly  evaluating due to Obesity.   No flank tenderness.  No suprapubic pain.  Occasional IBS with diarrhea.  No abdominal guarding and no rebound tenderness.   Genitourinary:     Comments: BPH with nocturia and frequency.   Musculoskeletal:         General: Tenderness present. Normal range of motion.      Cervical back: Normal range of motion. Tenderness present.      Comments: Bilateral foot drop. Arthritis both knees and lumbar spine.   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  Ambulates with a cane or a walker.  Somewhat unsteady on his feet due to peripheral neuropathy and bilateral foot drop.   Skin:     General: Skin is warm.      Findings: Bruising, erythema and rash present.      Comments: Bruising both arms from Coumadin.  Follows with   dermatologist.  Dr Lopez.    Neurological:      Mental Status: He is alert and oriented to person, place, and time. Mental status is at baseline.      Sensory:  Sensory deficit present.      Motor: Weakness present.      Coordination: Coordination abnormal.      Gait: Gait abnormal.      Deep Tendon Reflexes: Reflexes abnormal.      Comments: Peripheral neuropathy  and bilateral foot drop. Unsteady gait.  Poor coordination.  Must use a cane or a walker at all times to maintain his balance.  Has to be very cautious not to fall.      Psychiatric:         Behavior: Behavior normal.         Thought Content: Thought content normal.         Judgment: Judgment normal.      Comments: Patient has chronic anxiety and OCD behavior concerning his health issues.  He always has some sort of new medical concern or problem that he questions about.  Patient does have some age-related forgetfulness and does repeat himself.     PLAN : Patient is an 84-year-old diabetic male who was evaluated today for complete blood work, diabetes recheck and review of medication.  His blood sugar today was 180 and his A1c was 8.3% but he does not follow his diet consistently.  He will be notified of his other blood results in 3 days since he does have an upcoming cardiology appointment and needs his blood rechecked today.  I had a long discussion with the patient and his wife and he needs to use his walker or 2 canes to maintain his balance so he does not fall like he did about a week ago.  Patient also recently saw dermatology but still has bruising from his Coumadin.  I did recommend that he talk to cardiology about switching to Xarelto or Eliquis so he does not have to go for Coumadin checks.  Patient otherwise is stable and will follow-up as needed pending his blood work results.  I did encourage him to watch his diet better and stay away from the  doughnuts.    Problem List Items Addressed This Visit       CHF (congestive heart failure), NYHA class II, chronic, systolic    Relevant Orders    CBC    Comprehensive Metabolic Panel    Lipid Panel    POCT fingerstick glucose manually resulted (Completed)     POCT Glycosylated Hemoglobin (HGB A1C) docked device    Thyroid Stimulating Hormone    Diabetes mellitus without complication (Multi)    Relevant Orders    CBC    Comprehensive Metabolic Panel    Lipid Panel    POCT fingerstick glucose manually resulted (Completed)    POCT Glycosylated Hemoglobin (HGB A1C) docked device    Thyroid Stimulating Hormone    Hypertension    Relevant Orders    CBC    Comprehensive Metabolic Panel    Lipid Panel    POCT fingerstick glucose manually resulted (Completed)    POCT Glycosylated Hemoglobin (HGB A1C) docked device    Thyroid Stimulating Hormone    Vitamin D deficiency    Relevant Orders    Vitamin D 25-Hydroxy,Total (for eval of Vitamin D levels)    Screening PSA (prostate specific antigen)    Relevant Orders    Prostate Specific Antigen, Screen    Anticoagulated on Coumadin    Relevant Orders    CBC    Comprehensive Metabolic Panel    Lipid Panel    POCT fingerstick glucose manually resulted (Completed)    POCT Glycosylated Hemoglobin (HGB A1C) docked device    Thyroid Stimulating Hormone    Malaise and fatigue    Relevant Orders    CBC    Comprehensive Metabolic Panel    Lipid Panel    POCT fingerstick glucose manually resulted (Completed)    POCT Glycosylated Hemoglobin (HGB A1C) docked device    Thyroid Stimulating Hormone     Other Visit Diagnoses       Dyslipidemia        Relevant Orders    CBC    Comprehensive Metabolic Panel    Lipid Panel    POCT fingerstick glucose manually resulted (Completed)    POCT Glycosylated Hemoglobin (HGB A1C) docked device    Thyroid Stimulating Hormone                 Luis Newman DO

## 2024-12-16 NOTE — PROGRESS NOTES
No bleeding. Has many bruises on arms.   Medications and doses verified.  No scheduled procedures at this time.  INR=3.6   Plan: Weekly dose decreased by 11.1% (1/2 tab).  Follow up INR check in 2 weeks (pt requests 3 weeks).

## 2024-12-17 ENCOUNTER — APPOINTMENT (OUTPATIENT)
Dept: PRIMARY CARE | Facility: CLINIC | Age: 84
End: 2024-12-17
Payer: MEDICARE

## 2024-12-17 ENCOUNTER — OFFICE VISIT (OUTPATIENT)
Dept: CARDIOLOGY | Facility: CLINIC | Age: 84
End: 2024-12-17
Payer: MEDICARE

## 2024-12-17 VITALS
SYSTOLIC BLOOD PRESSURE: 126 MMHG | HEIGHT: 67 IN | BODY MASS INDEX: 34.53 KG/M2 | OXYGEN SATURATION: 95 % | WEIGHT: 220 LBS | HEART RATE: 76 BPM | DIASTOLIC BLOOD PRESSURE: 76 MMHG

## 2024-12-17 DIAGNOSIS — R53.82 CHRONIC FATIGUE: ICD-10-CM

## 2024-12-17 DIAGNOSIS — I95.0 IDIOPATHIC HYPOTENSION: ICD-10-CM

## 2024-12-17 DIAGNOSIS — I10 PRIMARY HYPERTENSION: ICD-10-CM

## 2024-12-17 DIAGNOSIS — E78.49 OTHER HYPERLIPIDEMIA: ICD-10-CM

## 2024-12-17 DIAGNOSIS — I10 BENIGN ESSENTIAL HYPERTENSION: Primary | ICD-10-CM

## 2024-12-17 DIAGNOSIS — I50.22 CHF (CONGESTIVE HEART FAILURE), NYHA CLASS II, CHRONIC, SYSTOLIC: ICD-10-CM

## 2024-12-17 DIAGNOSIS — I25.118 CORONARY ARTERY DISEASE OF NATIVE ARTERY OF NATIVE HEART WITH STABLE ANGINA PECTORIS: ICD-10-CM

## 2024-12-17 DIAGNOSIS — Z95.5 HISTORY OF CORONARY ARTERY STENT PLACEMENT: ICD-10-CM

## 2024-12-17 DIAGNOSIS — Z79.01 ANTICOAGULATED ON COUMADIN: ICD-10-CM

## 2024-12-17 DIAGNOSIS — I34.0 MODERATE MITRAL REGURGITATION: ICD-10-CM

## 2024-12-17 DIAGNOSIS — E78.5 DYSLIPIDEMIA: ICD-10-CM

## 2024-12-17 DIAGNOSIS — I35.0 MILD AORTIC STENOSIS: ICD-10-CM

## 2024-12-17 DIAGNOSIS — I48.21 PERMANENT ATRIAL FIBRILLATION (MULTI): ICD-10-CM

## 2024-12-17 PROCEDURE — 93005 ELECTROCARDIOGRAM TRACING: CPT | Performed by: INTERNAL MEDICINE

## 2024-12-17 PROCEDURE — 3074F SYST BP LT 130 MM HG: CPT | Performed by: INTERNAL MEDICINE

## 2024-12-17 PROCEDURE — 1036F TOBACCO NON-USER: CPT | Performed by: INTERNAL MEDICINE

## 2024-12-17 PROCEDURE — 99213 OFFICE O/P EST LOW 20 MIN: CPT | Performed by: INTERNAL MEDICINE

## 2024-12-17 PROCEDURE — 3078F DIAST BP <80 MM HG: CPT | Performed by: INTERNAL MEDICINE

## 2024-12-17 PROCEDURE — 1159F MED LIST DOCD IN RCRD: CPT | Performed by: INTERNAL MEDICINE

## 2024-12-17 PROCEDURE — 1123F ACP DISCUSS/DSCN MKR DOCD: CPT | Performed by: INTERNAL MEDICINE

## 2024-12-17 NOTE — PROGRESS NOTES
Chief Complaint:  Follow-up coronary artery disease, congestive heart failure.    HPI    He continues to struggle with some orthopedic issues.  He has some instability in his right knee.  Really does not describe any knee pain or knee discomfort.  Continues to have some visual hallucinations at night.  This began to occur after his back operation.  No anginal symptoms.    An echocardiographic study in December 2021 demonstrated normal left ventricular systolic function. There was moderate mitral regurgitation present.     In April 2021 he underwent lumbar spine surgery He had lumbar disc 1-5 repaired. He tolerated the procedure well and did not have any significant cardiac complications. He was able to discontinue anticoagulation and then restarted post procedure.       Cardiac catheterization in 2003 demonstrated to 25% left main cornea artery 25% left anterior descending coronary artery total occlusion of the circumflex was cornea artery and a 99% right coronary artery stenosis treated with balloon angioplasty and stent placement     Other cardiac problems including history of hypertension hyperlipidemia. He has chronic atrial fibrillation.      Allergies  Medication    · Clindamycin HCl CAPS   · Codeine Derivatives   · Penicillins   · Sulfa Drugs      Family History  Mother    · Family history of diabetes mellitus (V18.0) (Z83.3)  Father    · Family history of asthma (V17.5) (Z82.5)    Social History  Problems    · Former smoker (V15.82) (Z87.891)   · quit in dec 8 1997   · No alcohol use     Review of Systems   Constitutional: Positive for malaise/fatigue.   Cardiovascular:  Positive for dyspnea on exertion.   Hematologic/Lymphatic: Bruises/bleeds easily.   Skin:  Positive for poor wound healing.   Musculoskeletal:  Positive for arthritis and joint pain.         Current Outpatient Medications   Medication Sig Dispense Refill    albuterol 2.5 mg /3 mL (0.083 %) nebulizer solution Take 3 mL (2.5 mg) by  nebulization every 8 hours if needed for wheezing. 90 mL 1    cetirizine (ZyrTEC) 10 mg tablet Take 1 tablet (10 mg) by mouth once daily.      cholecalciferol (Vitamin D-3) 50 MCG (2000 UT) tablet Take 1 tablet (2,000 Units) by mouth once daily.      clobetasoL 0.025 % cream Apply topically.      Contour Next Test Strips strip USE TO TEST TWICE DAILY 200 strip 3    furosemide (Lasix) 40 mg tablet TAKE 1 TABLET BY MOUTH TWICE DAILY 180 tablet 3    gabapentin (Neurontin) 600 mg tablet Take 1 tablet (600 mg) by mouth 3 times a day. 270 tablet 3    losartan (Cozaar) 50 mg tablet Take 1 tablet (50 mg) by mouth once daily. 90 tablet 3    metoprolol succinate XL (Toprol-XL) 100 mg 24 hr tablet Take 1 tablet (100 mg) by mouth once daily at bedtime. Do not crush or chew. 90 tablet 3    metoprolol succinate XL (Toprol-XL) 50 mg 24 hr tablet Take 1 tablet (50 mg) by mouth once daily in the morning. Take before meals. Do not crush or chew. 90 tablet 3    Microlet Lancet misc USE AS DIRECTED TO TEST BLOOD SUGARS TWICE DAILY 200 each 3    nitroglycerin (Nitrostat) 0.4 mg SL tablet Place 1 tablet (0.4 mg) under the tongue every 5 minutes if needed for chest pain.      nystatin (Mycostatin) 100,000 unit/gram powder Apply 1 Application topically 2 times a day. 60 g 3    pramoxine HCl (CERAVE ITCH RELIEF TOP) Apply topically.      rosuvastatin (Crestor) 10 mg tablet Take 1 tablet (10 mg) by mouth once daily. 90 tablet 3    SITagliptin phos-metformin (Janumet) 50-1,000 mg tablet Take 1 tablet by mouth 2 times a day. 180 tablet 2    tamsulosin (Flomax) 0.4 mg 24 hr capsule Take 1 capsule (0.4 mg) by mouth once daily at bedtime. 90 capsule 3    insulin glargine (Toujeo Max U-300 SoloStar) 300 unit/mL (3 mL) injection Inject 15 Units under the skin once daily at bedtime. Take as directed per insulin instructions. 3 mL 1    warfarin (Coumadin) 5 mg tablet Take 1 tablet (5 mg) by mouth once daily at bedtime. Take as directed per After  "Visit Summary. 90 tablet 3     No current facility-administered medications for this visit.        Visit Vitals  /76 (BP Location: Left arm)   Pulse 76   Ht 1.702 m (5' 7\")   Wt 99.8 kg (220 lb)   SpO2 95%   BMI 34.46 kg/m²   Smoking Status Former   BSA 2.17 m²        Objective     Constitutional:       Appearance: Not in distress.   Neck:      Vascular: JVD normal.   Pulmonary:      Breath sounds: Normal breath sounds.   Cardiovascular:      Normal rate. Regular rhythm. S1 with normal intensity. S2 with normal intensity.       Murmurs: There is no murmur.      No gallop.    Pulses:     Intact distal pulses.   Edema:     Peripheral edema absent.   Abdominal:      General: Bowel sounds are normal.   Neurological:      Mental Status: Alert and oriented to person, place and time.         Lab Review:   Lab Results   Component Value Date     12/16/2024    K 4.1 12/16/2024     12/16/2024    CO2 28 12/16/2024    BUN 18 12/16/2024    CREATININE 1.26 12/16/2024    GLUCOSE 144 (H) 12/16/2024    CALCIUM 8.6 12/16/2024     Lab Results   Component Value Date    CHOL 105 12/16/2024    TRIG 148 12/16/2024    HDL 31.8 12/16/2024       Assessment:    1.  Coronary artery disease.  Continue medical management.  Activity levels are limited but he is not having any anginal symptoms.  Doing well on current medical management.    2.  Chronic systolic congestive heart failure.  His latest echocardiographic study showed a left ventricular ejection fraction of 40% to 45% with global hypokinesis and moderate mitral regurgitation.  On today's examination does not exhibit any heart failure.    3.  Hyperlipidemia.  Cholesterol 105, HDL 32, LDL 44.    4.  Leg pains.  PVR studies show adequate perfusion to his lower extremities.  This is a nonvascular issue.  "

## 2024-12-18 LAB
Q ONSET: 218 MS
QRS COUNT: 13 BEATS
QRS DURATION: 156 MS
QT INTERVAL: 436 MS
QTC CALCULATION(BAZETT): 502 MS
QTC FREDERICIA: 480 MS
R AXIS: 77 DEGREES
T AXIS: 1 DEGREES
T OFFSET: 436 MS
VENTRICULAR RATE: 80 BPM

## 2024-12-19 NOTE — RESULT ENCOUNTER NOTE
Kidney and liver function are normal     white blood cell count is normal           borderline anemic at 12.0   should be above 13.5      he can try to take a vitamin with iron daily        platelet count is also a little bit low at 129,000      we can recheck that next visit.       Cholesterol is normal at 105    triglycerides are normal at 148        vitamin D is normal at 79       thyroid function is normal       prostate level is normal 1.94       he might be anemic from his Coumadin level     so try to get the Coumadin under better control       and we will recheck his platelet count next time he comes in.  Also ask   Dr. Coy if you could switch to Xarelto or Eliquis blood thinner.

## 2024-12-20 ENCOUNTER — APPOINTMENT (OUTPATIENT)
Dept: PHARMACY | Facility: CLINIC | Age: 84
End: 2024-12-20
Payer: MEDICARE

## 2024-12-22 DIAGNOSIS — J45.41 MODERATE PERSISTENT ASTHMATIC BRONCHITIS WITH ACUTE EXACERBATION (HHS-HCC): Primary | ICD-10-CM

## 2024-12-22 RX ORDER — DOXYCYCLINE 100 MG/1
100 CAPSULE ORAL 2 TIMES DAILY
Qty: 20 CAPSULE | Refills: 0 | Status: SHIPPED | OUTPATIENT
Start: 2024-12-22 | End: 2025-01-01

## 2024-12-22 RX ORDER — BROMPHENIRAMINE MALEATE, PSEUDOEPHEDRINE HYDROCHLORIDE, AND DEXTROMETHORPHAN HYDROBROMIDE 2; 30; 10 MG/5ML; MG/5ML; MG/5ML
5 SYRUP ORAL 3 TIMES DAILY PRN
Qty: 120 ML | Refills: 0 | Status: SHIPPED | OUTPATIENT
Start: 2024-12-22 | End: 2025-01-01

## 2024-12-22 RX ORDER — PREDNISONE 10 MG/1
TABLET ORAL
Qty: 17 TABLET | Refills: 0 | Status: SHIPPED | OUTPATIENT
Start: 2024-12-22

## 2024-12-23 ENCOUNTER — TELEPHONE (OUTPATIENT)
Dept: PHARMACY | Facility: CLINIC | Age: 84
End: 2024-12-23
Payer: MEDICARE

## 2024-12-23 NOTE — TELEPHONE ENCOUNTER
Pt wife calls.  Pt on prednisone and doxycycline x 10 days.  Also bromphernerimine and dextromethoraphan.  Will re-schedule for him to come in sooner

## 2024-12-24 ENCOUNTER — APPOINTMENT (OUTPATIENT)
Dept: CARDIOLOGY | Facility: HOSPITAL | Age: 84
DRG: 193 | End: 2024-12-24
Payer: MEDICARE

## 2024-12-24 ENCOUNTER — APPOINTMENT (OUTPATIENT)
Dept: PRIMARY CARE | Facility: CLINIC | Age: 84
End: 2024-12-24
Payer: MEDICARE

## 2024-12-24 ENCOUNTER — APPOINTMENT (OUTPATIENT)
Dept: RADIOLOGY | Facility: HOSPITAL | Age: 84
DRG: 193 | End: 2024-12-24
Payer: MEDICARE

## 2024-12-24 ENCOUNTER — HOSPITAL ENCOUNTER (INPATIENT)
Facility: HOSPITAL | Age: 84
LOS: 4 days | Discharge: HOME | DRG: 193 | End: 2024-12-28
Attending: STUDENT IN AN ORGANIZED HEALTH CARE EDUCATION/TRAINING PROGRAM | Admitting: STUDENT IN AN ORGANIZED HEALTH CARE EDUCATION/TRAINING PROGRAM
Payer: MEDICARE

## 2024-12-24 DIAGNOSIS — R09.02 HYPOXIA: ICD-10-CM

## 2024-12-24 DIAGNOSIS — I10 PRIMARY HYPERTENSION: ICD-10-CM

## 2024-12-24 DIAGNOSIS — M79.661 RIGHT CALF PAIN: ICD-10-CM

## 2024-12-24 DIAGNOSIS — J18.9 PNEUMONIA OF BOTH LUNGS DUE TO INFECTIOUS ORGANISM, UNSPECIFIED PART OF LUNG: Primary | ICD-10-CM

## 2024-12-24 LAB
ALBUMIN SERPL BCP-MCNC: 4.1 G/DL (ref 3.4–5)
ALP SERPL-CCNC: 56 U/L (ref 33–136)
ALT SERPL W P-5'-P-CCNC: 29 U/L (ref 10–52)
ANION GAP SERPL CALC-SCNC: 11 MMOL/L (ref 10–20)
APPEARANCE UR: ABNORMAL
AST SERPL W P-5'-P-CCNC: 28 U/L (ref 9–39)
BASOPHILS # BLD AUTO: 0.03 X10*3/UL (ref 0–0.1)
BASOPHILS NFR BLD AUTO: 0.3 %
BILIRUB SERPL-MCNC: 0.8 MG/DL (ref 0–1.2)
BILIRUB UR STRIP.AUTO-MCNC: NEGATIVE MG/DL
BNP SERPL-MCNC: 647 PG/ML (ref 0–99)
BUN SERPL-MCNC: 19 MG/DL (ref 6–23)
CALCIUM SERPL-MCNC: 8.6 MG/DL (ref 8.6–10.3)
CARDIAC TROPONIN I PNL SERPL HS: 13 NG/L (ref 0–20)
CHLORIDE SERPL-SCNC: 105 MMOL/L (ref 98–107)
CO2 SERPL-SCNC: 26 MMOL/L (ref 21–32)
COLOR UR: YELLOW
CREAT SERPL-MCNC: 1.21 MG/DL (ref 0.5–1.3)
EGFRCR SERPLBLD CKD-EPI 2021: 59 ML/MIN/1.73M*2
EOSINOPHIL # BLD AUTO: 0.04 X10*3/UL (ref 0–0.4)
EOSINOPHIL NFR BLD AUTO: 0.4 %
ERYTHROCYTE [DISTWIDTH] IN BLOOD BY AUTOMATED COUNT: 15.2 % (ref 11.5–14.5)
FLUAV RNA RESP QL NAA+PROBE: NOT DETECTED
FLUBV RNA RESP QL NAA+PROBE: NOT DETECTED
GLUCOSE BLD MANUAL STRIP-MCNC: 164 MG/DL (ref 74–99)
GLUCOSE BLD MANUAL STRIP-MCNC: 171 MG/DL (ref 74–99)
GLUCOSE BLD MANUAL STRIP-MCNC: 190 MG/DL (ref 74–99)
GLUCOSE BLD MANUAL STRIP-MCNC: 208 MG/DL (ref 74–99)
GLUCOSE SERPL-MCNC: 171 MG/DL (ref 74–99)
GLUCOSE UR STRIP.AUTO-MCNC: NORMAL MG/DL
HCT VFR BLD AUTO: 37.6 % (ref 41–52)
HGB BLD-MCNC: 11.7 G/DL (ref 13.5–17.5)
HOLD SPECIMEN: NORMAL
IMM GRANULOCYTES # BLD AUTO: 0.04 X10*3/UL (ref 0–0.5)
IMM GRANULOCYTES NFR BLD AUTO: 0.4 % (ref 0–0.9)
INR PPP: 3.6 (ref 0.9–1.1)
KETONES UR STRIP.AUTO-MCNC: NEGATIVE MG/DL
LEUKOCYTE ESTERASE UR QL STRIP.AUTO: ABNORMAL
LIPASE SERPL-CCNC: 41 U/L (ref 9–82)
LYMPHOCYTES # BLD AUTO: 1.04 X10*3/UL (ref 0.8–3)
LYMPHOCYTES NFR BLD AUTO: 10.6 %
MAGNESIUM SERPL-MCNC: 1.58 MG/DL (ref 1.6–2.4)
MCH RBC QN AUTO: 27.1 PG (ref 26–34)
MCHC RBC AUTO-ENTMCNC: 31.1 G/DL (ref 32–36)
MCV RBC AUTO: 87 FL (ref 80–100)
MONOCYTES # BLD AUTO: 0.97 X10*3/UL (ref 0.05–0.8)
MONOCYTES NFR BLD AUTO: 9.9 %
MUCOUS THREADS #/AREA URNS AUTO: ABNORMAL /LPF
NEUTROPHILS # BLD AUTO: 7.71 X10*3/UL (ref 1.6–5.5)
NEUTROPHILS NFR BLD AUTO: 78.4 %
NITRITE UR QL STRIP.AUTO: NEGATIVE
NRBC BLD-RTO: 0 /100 WBCS (ref 0–0)
PH UR STRIP.AUTO: 5 [PH]
PLATELET # BLD AUTO: 151 X10*3/UL (ref 150–450)
POTASSIUM SERPL-SCNC: 3.8 MMOL/L (ref 3.5–5.3)
PROT SERPL-MCNC: 6.9 G/DL (ref 6.4–8.2)
PROT UR STRIP.AUTO-MCNC: ABNORMAL MG/DL
PROTHROMBIN TIME: 41.1 SECONDS (ref 9.8–12.8)
RBC # BLD AUTO: 4.31 X10*6/UL (ref 4.5–5.9)
RBC # UR STRIP.AUTO: ABNORMAL /UL
RBC #/AREA URNS AUTO: ABNORMAL /HPF
SARS-COV-2 RNA RESP QL NAA+PROBE: NOT DETECTED
SODIUM SERPL-SCNC: 138 MMOL/L (ref 136–145)
SP GR UR STRIP.AUTO: 1.02
UROBILINOGEN UR STRIP.AUTO-MCNC: NORMAL MG/DL
WBC # BLD AUTO: 9.8 X10*3/UL (ref 4.4–11.3)
WBC #/AREA URNS AUTO: ABNORMAL /HPF

## 2024-12-24 PROCEDURE — 96375 TX/PRO/DX INJ NEW DRUG ADDON: CPT

## 2024-12-24 PROCEDURE — 71046 X-RAY EXAM CHEST 2 VIEWS: CPT | Mod: FOREIGN READ | Performed by: RADIOLOGY

## 2024-12-24 PROCEDURE — 36415 COLL VENOUS BLD VENIPUNCTURE: CPT | Performed by: PHYSICIAN ASSISTANT

## 2024-12-24 PROCEDURE — 87449 NOS EACH ORGANISM AG IA: CPT | Mod: PARLAB | Performed by: NURSE PRACTITIONER

## 2024-12-24 PROCEDURE — 99285 EMERGENCY DEPT VISIT HI MDM: CPT | Mod: 25 | Performed by: STUDENT IN AN ORGANIZED HEALTH CARE EDUCATION/TRAINING PROGRAM

## 2024-12-24 PROCEDURE — 2500000004 HC RX 250 GENERAL PHARMACY W/ HCPCS (ALT 636 FOR OP/ED): Performed by: NURSE PRACTITIONER

## 2024-12-24 PROCEDURE — 83690 ASSAY OF LIPASE: CPT | Performed by: PHYSICIAN ASSISTANT

## 2024-12-24 PROCEDURE — 84484 ASSAY OF TROPONIN QUANT: CPT | Performed by: PHYSICIAN ASSISTANT

## 2024-12-24 PROCEDURE — 85025 COMPLETE CBC W/AUTO DIFF WBC: CPT | Performed by: PHYSICIAN ASSISTANT

## 2024-12-24 PROCEDURE — 82947 ASSAY GLUCOSE BLOOD QUANT: CPT

## 2024-12-24 PROCEDURE — 87086 URINE CULTURE/COLONY COUNT: CPT | Mod: PARLAB | Performed by: PHYSICIAN ASSISTANT

## 2024-12-24 PROCEDURE — 2500000001 HC RX 250 WO HCPCS SELF ADMINISTERED DRUGS (ALT 637 FOR MEDICARE OP): Performed by: NURSE PRACTITIONER

## 2024-12-24 PROCEDURE — 1200000002 HC GENERAL ROOM WITH TELEMETRY DAILY

## 2024-12-24 PROCEDURE — 2500000002 HC RX 250 W HCPCS SELF ADMINISTERED DRUGS (ALT 637 FOR MEDICARE OP, ALT 636 FOR OP/ED): Performed by: STUDENT IN AN ORGANIZED HEALTH CARE EDUCATION/TRAINING PROGRAM

## 2024-12-24 PROCEDURE — 80053 COMPREHEN METABOLIC PANEL: CPT | Performed by: PHYSICIAN ASSISTANT

## 2024-12-24 PROCEDURE — 83735 ASSAY OF MAGNESIUM: CPT | Performed by: PHYSICIAN ASSISTANT

## 2024-12-24 PROCEDURE — 87636 SARSCOV2 & INF A&B AMP PRB: CPT | Performed by: PHYSICIAN ASSISTANT

## 2024-12-24 PROCEDURE — 96365 THER/PROPH/DIAG IV INF INIT: CPT

## 2024-12-24 PROCEDURE — 71046 X-RAY EXAM CHEST 2 VIEWS: CPT

## 2024-12-24 PROCEDURE — 94640 AIRWAY INHALATION TREATMENT: CPT

## 2024-12-24 PROCEDURE — 83880 ASSAY OF NATRIURETIC PEPTIDE: CPT | Performed by: PHYSICIAN ASSISTANT

## 2024-12-24 PROCEDURE — 93005 ELECTROCARDIOGRAM TRACING: CPT

## 2024-12-24 PROCEDURE — 81001 URINALYSIS AUTO W/SCOPE: CPT | Performed by: PHYSICIAN ASSISTANT

## 2024-12-24 PROCEDURE — 87899 AGENT NOS ASSAY W/OPTIC: CPT | Mod: PARLAB | Performed by: NURSE PRACTITIONER

## 2024-12-24 PROCEDURE — 2500000004 HC RX 250 GENERAL PHARMACY W/ HCPCS (ALT 636 FOR OP/ED): Performed by: PHYSICIAN ASSISTANT

## 2024-12-24 PROCEDURE — 2500000002 HC RX 250 W HCPCS SELF ADMINISTERED DRUGS (ALT 637 FOR MEDICARE OP, ALT 636 FOR OP/ED): Performed by: NURSE PRACTITIONER

## 2024-12-24 PROCEDURE — 85610 PROTHROMBIN TIME: CPT | Performed by: PHYSICIAN ASSISTANT

## 2024-12-24 RX ORDER — METOPROLOL SUCCINATE 50 MG/1
50 TABLET, EXTENDED RELEASE ORAL
Status: DISCONTINUED | OUTPATIENT
Start: 2024-12-25 | End: 2024-12-28 | Stop reason: HOSPADM

## 2024-12-24 RX ORDER — TAMSULOSIN HYDROCHLORIDE 0.4 MG/1
0.4 CAPSULE ORAL NIGHTLY
Status: DISCONTINUED | OUTPATIENT
Start: 2024-12-24 | End: 2024-12-28 | Stop reason: HOSPADM

## 2024-12-24 RX ORDER — CLOBETASOL PROPIONATE 0.5 MG/G
1 CREAM TOPICAL AS NEEDED
COMMUNITY

## 2024-12-24 RX ORDER — FUROSEMIDE 10 MG/ML
20 INJECTION INTRAMUSCULAR; INTRAVENOUS ONCE
Status: COMPLETED | OUTPATIENT
Start: 2024-12-24 | End: 2024-12-24

## 2024-12-24 RX ORDER — LEVOFLOXACIN 5 MG/ML
750 INJECTION, SOLUTION INTRAVENOUS ONCE
Status: COMPLETED | OUTPATIENT
Start: 2024-12-24 | End: 2024-12-24

## 2024-12-24 RX ORDER — LEVOFLOXACIN 5 MG/ML
750 INJECTION, SOLUTION INTRAVENOUS EVERY 24 HOURS
Status: DISCONTINUED | OUTPATIENT
Start: 2024-12-25 | End: 2024-12-28 | Stop reason: HOSPADM

## 2024-12-24 RX ORDER — GABAPENTIN 300 MG/1
600 CAPSULE ORAL 3 TIMES DAILY
Status: DISCONTINUED | OUTPATIENT
Start: 2024-12-24 | End: 2024-12-28 | Stop reason: HOSPADM

## 2024-12-24 RX ORDER — FUROSEMIDE 40 MG/1
40 TABLET ORAL EVERY MORNING
Status: DISCONTINUED | OUTPATIENT
Start: 2024-12-25 | End: 2024-12-28 | Stop reason: HOSPADM

## 2024-12-24 RX ORDER — IPRATROPIUM BROMIDE AND ALBUTEROL SULFATE 2.5; .5 MG/3ML; MG/3ML
3 SOLUTION RESPIRATORY (INHALATION)
Status: DISCONTINUED | OUTPATIENT
Start: 2024-12-24 | End: 2024-12-24

## 2024-12-24 RX ORDER — METFORMIN HYDROCHLORIDE 500 MG/1
1000 TABLET ORAL
Status: DISCONTINUED | OUTPATIENT
Start: 2024-12-24 | End: 2024-12-28 | Stop reason: HOSPADM

## 2024-12-24 RX ORDER — ROSUVASTATIN CALCIUM 10 MG/1
10 TABLET, COATED ORAL DAILY
Status: DISCONTINUED | OUTPATIENT
Start: 2024-12-25 | End: 2024-12-28 | Stop reason: HOSPADM

## 2024-12-24 RX ORDER — IPRATROPIUM BROMIDE AND ALBUTEROL SULFATE 2.5; .5 MG/3ML; MG/3ML
3 SOLUTION RESPIRATORY (INHALATION)
Status: DISCONTINUED | OUTPATIENT
Start: 2024-12-24 | End: 2024-12-28 | Stop reason: HOSPADM

## 2024-12-24 RX ORDER — ONDANSETRON HYDROCHLORIDE 2 MG/ML
4 INJECTION, SOLUTION INTRAVENOUS EVERY 12 HOURS PRN
Status: DISCONTINUED | OUTPATIENT
Start: 2024-12-24 | End: 2024-12-28 | Stop reason: HOSPADM

## 2024-12-24 RX ORDER — L. ACIDOPHILUS/L.BULGARICUS 1MM CELL
1 TABLET ORAL 2 TIMES DAILY
Status: DISCONTINUED | OUTPATIENT
Start: 2024-12-24 | End: 2024-12-28 | Stop reason: HOSPADM

## 2024-12-24 RX ORDER — INSULIN GLARGINE 100 [IU]/ML
12 INJECTION, SOLUTION SUBCUTANEOUS EVERY 24 HOURS
Status: DISCONTINUED | OUTPATIENT
Start: 2024-12-24 | End: 2024-12-25

## 2024-12-24 RX ORDER — WARFARIN SODIUM 5 MG/1
2.5 TABLET ORAL
COMMUNITY

## 2024-12-24 RX ORDER — METOPROLOL SUCCINATE 50 MG/1
100 TABLET, EXTENDED RELEASE ORAL NIGHTLY
Status: DISCONTINUED | OUTPATIENT
Start: 2024-12-24 | End: 2024-12-28 | Stop reason: HOSPADM

## 2024-12-24 RX ORDER — WARFARIN SODIUM 5 MG/1
5 TABLET ORAL
Status: DISCONTINUED | OUTPATIENT
Start: 2024-12-29 | End: 2024-12-28 | Stop reason: HOSPADM

## 2024-12-24 RX ORDER — DEXTROSE 50 % IN WATER (D50W) INTRAVENOUS SYRINGE
12.5
Status: DISCONTINUED | OUTPATIENT
Start: 2024-12-24 | End: 2024-12-28 | Stop reason: HOSPADM

## 2024-12-24 RX ORDER — DEXTROSE 50 % IN WATER (D50W) INTRAVENOUS SYRINGE
25
Status: DISCONTINUED | OUTPATIENT
Start: 2024-12-24 | End: 2024-12-28 | Stop reason: HOSPADM

## 2024-12-24 RX ORDER — ALBUTEROL SULFATE 0.83 MG/ML
2.5 SOLUTION RESPIRATORY (INHALATION) EVERY 4 HOURS PRN
Status: DISCONTINUED | OUTPATIENT
Start: 2024-12-24 | End: 2024-12-24

## 2024-12-24 RX ORDER — INSULIN LISPRO 100 [IU]/ML
0-5 INJECTION, SOLUTION INTRAVENOUS; SUBCUTANEOUS
Status: DISCONTINUED | OUTPATIENT
Start: 2024-12-24 | End: 2024-12-25

## 2024-12-24 RX ORDER — ACETAMINOPHEN 160 MG/5ML
650 SOLUTION ORAL EVERY 4 HOURS PRN
Status: DISCONTINUED | OUTPATIENT
Start: 2024-12-24 | End: 2024-12-28 | Stop reason: HOSPADM

## 2024-12-24 RX ORDER — GUAIFENESIN 600 MG/1
600 TABLET, EXTENDED RELEASE ORAL 2 TIMES DAILY PRN
Status: DISCONTINUED | OUTPATIENT
Start: 2024-12-24 | End: 2024-12-28 | Stop reason: HOSPADM

## 2024-12-24 RX ORDER — ONDANSETRON HYDROCHLORIDE 2 MG/ML
4 INJECTION, SOLUTION INTRAVENOUS ONCE
Status: COMPLETED | OUTPATIENT
Start: 2024-12-24 | End: 2024-12-24

## 2024-12-24 RX ORDER — CETIRIZINE HYDROCHLORIDE 10 MG/1
10 TABLET ORAL NIGHTLY
Status: DISCONTINUED | OUTPATIENT
Start: 2024-12-24 | End: 2024-12-28 | Stop reason: HOSPADM

## 2024-12-24 RX ORDER — ACETAMINOPHEN 325 MG/1
650 TABLET ORAL EVERY 4 HOURS PRN
Status: DISCONTINUED | OUTPATIENT
Start: 2024-12-24 | End: 2024-12-28 | Stop reason: HOSPADM

## 2024-12-24 RX ORDER — ALBUTEROL SULFATE 0.83 MG/ML
2.5 SOLUTION RESPIRATORY (INHALATION) EVERY 2 HOUR PRN
Status: DISCONTINUED | OUTPATIENT
Start: 2024-12-24 | End: 2024-12-28 | Stop reason: HOSPADM

## 2024-12-24 RX ORDER — WARFARIN SODIUM 5 MG/1
2.5 TABLET ORAL
Status: DISCONTINUED | OUTPATIENT
Start: 2024-12-25 | End: 2024-12-28 | Stop reason: HOSPADM

## 2024-12-24 RX ORDER — POLYETHYLENE GLYCOL 3350 17 G/17G
17 POWDER, FOR SOLUTION ORAL DAILY
Status: DISCONTINUED | OUTPATIENT
Start: 2024-12-24 | End: 2024-12-28 | Stop reason: HOSPADM

## 2024-12-24 RX ORDER — ACETAMINOPHEN 650 MG/1
650 SUPPOSITORY RECTAL EVERY 4 HOURS PRN
Status: DISCONTINUED | OUTPATIENT
Start: 2024-12-24 | End: 2024-12-28 | Stop reason: HOSPADM

## 2024-12-24 RX ORDER — LOSARTAN POTASSIUM 50 MG/1
50 TABLET ORAL DAILY
Status: DISCONTINUED | OUTPATIENT
Start: 2024-12-25 | End: 2024-12-28 | Stop reason: HOSPADM

## 2024-12-24 RX ORDER — NITROGLYCERIN 0.4 MG/1
0.4 TABLET SUBLINGUAL EVERY 5 MIN PRN
Status: DISCONTINUED | OUTPATIENT
Start: 2024-12-24 | End: 2024-12-28 | Stop reason: HOSPADM

## 2024-12-24 RX ADMIN — Medication 1 TABLET: at 20:50

## 2024-12-24 RX ADMIN — FUROSEMIDE 20 MG: 10 INJECTION, SOLUTION INTRAMUSCULAR; INTRAVENOUS at 17:01

## 2024-12-24 RX ADMIN — GABAPENTIN 600 MG: 300 CAPSULE ORAL at 20:51

## 2024-12-24 RX ADMIN — LEVOFLOXACIN 750 MG: 750 INJECTION, SOLUTION INTRAVENOUS at 13:37

## 2024-12-24 RX ADMIN — ONDANSETRON 4 MG: 2 INJECTION INTRAMUSCULAR; INTRAVENOUS at 11:29

## 2024-12-24 RX ADMIN — IPRATROPIUM BROMIDE AND ALBUTEROL SULFATE 3 ML: .5; 3 SOLUTION RESPIRATORY (INHALATION) at 16:29

## 2024-12-24 RX ADMIN — SITAGLIPTIN 50 MG: 50 TABLET, FILM COATED ORAL at 20:50

## 2024-12-24 RX ADMIN — METOPROLOL SUCCINATE 100 MG: 50 TABLET, EXTENDED RELEASE ORAL at 20:50

## 2024-12-24 RX ADMIN — CETIRIZINE HYDROCHLORIDE 10 MG: 10 TABLET, FILM COATED ORAL at 20:50

## 2024-12-24 RX ADMIN — GABAPENTIN 600 MG: 300 CAPSULE ORAL at 17:00

## 2024-12-24 RX ADMIN — IPRATROPIUM BROMIDE AND ALBUTEROL SULFATE 3 ML: .5; 3 SOLUTION RESPIRATORY (INHALATION) at 19:50

## 2024-12-24 RX ADMIN — TAMSULOSIN HYDROCHLORIDE 0.4 MG: 0.4 CAPSULE ORAL at 20:50

## 2024-12-24 RX ADMIN — INSULIN LISPRO 2 UNITS: 100 INJECTION, SOLUTION INTRAVENOUS; SUBCUTANEOUS at 18:30

## 2024-12-24 RX ADMIN — INSULIN GLARGINE 12 UNITS: 100 INJECTION, SOLUTION SUBCUTANEOUS at 20:47

## 2024-12-24 RX ADMIN — METFORMIN HYDROCHLORIDE 1000 MG: 500 TABLET ORAL at 17:00

## 2024-12-24 SDOH — SOCIAL STABILITY: SOCIAL INSECURITY
WITHIN THE LAST YEAR, HAVE YOU BEEN KICKED, HIT, SLAPPED, OR OTHERWISE PHYSICALLY HURT BY YOUR PARTNER OR EX-PARTNER?: NO

## 2024-12-24 SDOH — SOCIAL STABILITY: SOCIAL INSECURITY: HAVE YOU HAD ANY THOUGHTS OF HARMING ANYONE ELSE?: NO

## 2024-12-24 SDOH — SOCIAL STABILITY: SOCIAL INSECURITY: WITHIN THE LAST YEAR, HAVE YOU BEEN HUMILIATED OR EMOTIONALLY ABUSED IN OTHER WAYS BY YOUR PARTNER OR EX-PARTNER?: NO

## 2024-12-24 SDOH — ECONOMIC STABILITY: FOOD INSECURITY: WITHIN THE PAST 12 MONTHS, THE FOOD YOU BOUGHT JUST DIDN'T LAST AND YOU DIDN'T HAVE MONEY TO GET MORE.: NEVER TRUE

## 2024-12-24 SDOH — SOCIAL STABILITY: SOCIAL INSECURITY: WITHIN THE LAST YEAR, HAVE YOU BEEN AFRAID OF YOUR PARTNER OR EX-PARTNER?: NO

## 2024-12-24 SDOH — SOCIAL STABILITY: SOCIAL INSECURITY
WITHIN THE LAST YEAR, HAVE YOU BEEN RAPED OR FORCED TO HAVE ANY KIND OF SEXUAL ACTIVITY BY YOUR PARTNER OR EX-PARTNER?: NO

## 2024-12-24 SDOH — SOCIAL STABILITY: SOCIAL INSECURITY: HAVE YOU HAD THOUGHTS OF HARMING ANYONE ELSE?: NO

## 2024-12-24 SDOH — ECONOMIC STABILITY: FOOD INSECURITY: WITHIN THE PAST 12 MONTHS, YOU WORRIED THAT YOUR FOOD WOULD RUN OUT BEFORE YOU GOT THE MONEY TO BUY MORE.: NEVER TRUE

## 2024-12-24 SDOH — SOCIAL STABILITY: SOCIAL INSECURITY: ARE THERE ANY APPARENT SIGNS OF INJURIES/BEHAVIORS THAT COULD BE RELATED TO ABUSE/NEGLECT?: NO

## 2024-12-24 SDOH — SOCIAL STABILITY: SOCIAL INSECURITY: HAS ANYONE EVER THREATENED TO HURT YOUR FAMILY OR YOUR PETS?: NO

## 2024-12-24 SDOH — SOCIAL STABILITY: SOCIAL INSECURITY: DO YOU FEEL ANYONE HAS EXPLOITED OR TAKEN ADVANTAGE OF YOU FINANCIALLY OR OF YOUR PERSONAL PROPERTY?: NO

## 2024-12-24 SDOH — ECONOMIC STABILITY: INCOME INSECURITY: IN THE PAST 12 MONTHS HAS THE ELECTRIC, GAS, OIL, OR WATER COMPANY THREATENED TO SHUT OFF SERVICES IN YOUR HOME?: NO

## 2024-12-24 SDOH — SOCIAL STABILITY: SOCIAL INSECURITY: DOES ANYONE TRY TO KEEP YOU FROM HAVING/CONTACTING OTHER FRIENDS OR DOING THINGS OUTSIDE YOUR HOME?: NO

## 2024-12-24 SDOH — SOCIAL STABILITY: SOCIAL INSECURITY: ARE YOU OR HAVE YOU BEEN THREATENED OR ABUSED PHYSICALLY, EMOTIONALLY, OR SEXUALLY BY ANYONE?: NO

## 2024-12-24 SDOH — SOCIAL STABILITY: SOCIAL INSECURITY: ABUSE: ADULT

## 2024-12-24 SDOH — SOCIAL STABILITY: SOCIAL INSECURITY: DO YOU FEEL UNSAFE GOING BACK TO THE PLACE WHERE YOU ARE LIVING?: NO

## 2024-12-24 SDOH — SOCIAL STABILITY: SOCIAL INSECURITY: WERE YOU ABLE TO COMPLETE ALL THE BEHAVIORAL HEALTH SCREENINGS?: YES

## 2024-12-24 ASSESSMENT — PAIN SCALES - GENERAL
PAINLEVEL_OUTOF10: 0 - NO PAIN
PAINLEVEL_OUTOF10: 0 - NO PAIN

## 2024-12-24 ASSESSMENT — COLUMBIA-SUICIDE SEVERITY RATING SCALE - C-SSRS
1. IN THE PAST MONTH, HAVE YOU WISHED YOU WERE DEAD OR WISHED YOU COULD GO TO SLEEP AND NOT WAKE UP?: NO
6. HAVE YOU EVER DONE ANYTHING, STARTED TO DO ANYTHING, OR PREPARED TO DO ANYTHING TO END YOUR LIFE?: NO
2. HAVE YOU ACTUALLY HAD ANY THOUGHTS OF KILLING YOURSELF?: NO
1. IN THE PAST MONTH, HAVE YOU WISHED YOU WERE DEAD OR WISHED YOU COULD GO TO SLEEP AND NOT WAKE UP?: NO
2. HAVE YOU ACTUALLY HAD ANY THOUGHTS OF KILLING YOURSELF?: NO
6. HAVE YOU EVER DONE ANYTHING, STARTED TO DO ANYTHING, OR PREPARED TO DO ANYTHING TO END YOUR LIFE?: NO

## 2024-12-24 ASSESSMENT — COGNITIVE AND FUNCTIONAL STATUS - GENERAL
DRESSING REGULAR LOWER BODY CLOTHING: A LITTLE
DAILY ACTIVITIY SCORE: 21
STANDING UP FROM CHAIR USING ARMS: A LITTLE
PATIENT BASELINE BEDBOUND: NO
MOVING TO AND FROM BED TO CHAIR: A LITTLE
HELP NEEDED FOR BATHING: A LITTLE
MOVING FROM LYING ON BACK TO SITTING ON SIDE OF FLAT BED WITH BEDRAILS: A LITTLE
TOILETING: A LITTLE
MOBILITY SCORE: 16
WALKING IN HOSPITAL ROOM: A LOT
CLIMB 3 TO 5 STEPS WITH RAILING: A LOT
TURNING FROM BACK TO SIDE WHILE IN FLAT BAD: A LITTLE

## 2024-12-24 ASSESSMENT — PATIENT HEALTH QUESTIONNAIRE - PHQ9
SUM OF ALL RESPONSES TO PHQ9 QUESTIONS 1 & 2: 0
2. FEELING DOWN, DEPRESSED OR HOPELESS: NOT AT ALL
1. LITTLE INTEREST OR PLEASURE IN DOING THINGS: NOT AT ALL

## 2024-12-24 ASSESSMENT — ACTIVITIES OF DAILY LIVING (ADL)
DRESSING YOURSELF: NEEDS ASSISTANCE
GROOMING: INDEPENDENT
TOILETING: NEEDS ASSISTANCE
HEARING - RIGHT EAR: FUNCTIONAL
FEEDING YOURSELF: INDEPENDENT
BATHING: NEEDS ASSISTANCE
ADEQUATE_TO_COMPLETE_ADL: YES
PATIENT'S MEMORY ADEQUATE TO SAFELY COMPLETE DAILY ACTIVITIES?: YES
ASSISTIVE_DEVICE: WALKER
LACK_OF_TRANSPORTATION: NO
WALKS IN HOME: NEEDS ASSISTANCE
JUDGMENT_ADEQUATE_SAFELY_COMPLETE_DAILY_ACTIVITIES: YES
HEARING - LEFT EAR: FUNCTIONAL

## 2024-12-24 ASSESSMENT — LIFESTYLE VARIABLES
AUDIT-C TOTAL SCORE: 0
HOW MANY STANDARD DRINKS CONTAINING ALCOHOL DO YOU HAVE ON A TYPICAL DAY: PATIENT DOES NOT DRINK
HOW OFTEN DO YOU HAVE A DRINK CONTAINING ALCOHOL: NEVER
SKIP TO QUESTIONS 9-10: 1
HOW OFTEN DO YOU HAVE 6 OR MORE DRINKS ON ONE OCCASION: NEVER
SUBSTANCE_ABUSE_PAST_12_MONTHS: NO
PRESCIPTION_ABUSE_PAST_12_MONTHS: NO
AUDIT-C TOTAL SCORE: 0

## 2024-12-24 ASSESSMENT — PAIN - FUNCTIONAL ASSESSMENT: PAIN_FUNCTIONAL_ASSESSMENT: 0-10
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
## 2024-12-24 NOTE — PROGRESS NOTES
Pharmacy Medication History Review    Trell Aguila is a 84 y.o. male admitted for No Principal Problem: There is no principal problem currently on the Problem List. Please update the Problem List and refresh.. Pharmacy reviewed the patient's [id number] medications and allergies for accuracy.    The list below reflectives the updated PTA list. Please review each medication in order reconciliation for additional clarification and justification.  Prior to Admission medications    Medication Sig Start Date End Date Authorizing Provider   brompheniramine-pseudoeph-DM 2-30-10 mg/5 mL syrup Take 5 mL by mouth 3 times a day as needed for cough for up to 10 days. 12/22/24 1/1/25 Luis Newman DO   cetirizine (ZyrTEC) 10 mg tablet Take 1 tablet (10 mg) by mouth once daily at bedtime.   Historical Provider, MD   cholecalciferol (Vitamin D-3) 50 MCG (2000 UT) tablet Take 1 tablet (2,000 Units) by mouth once daily.   Historical Provider, MD   doxycycline (Vibramycin) 100 mg capsule Take 1 capsule (100 mg) by mouth 2 times a day for 10 days. Take with at least 8 ounces (large glass) of water, do not lie down for 30 minutes after 12/22/24 1/1/25 Luis Newman DO   furosemide (Lasix) 40 mg tablet Take 1 tablet (40 mg) by mouth once daily in the morning.   Luis Newman DO   gabapentin (Neurontin) 600 mg tablet Take 1 tablet (600 mg) by mouth 3 times a day.   Luis Newman DO   insulin glargine (Toujeo Max U-300 SoloStar) 300 unit/mL (3 mL) injection Inject 15 Units under the skin as needed at bedtime (while on prednisone). 200 = 5 units, 250 = 10 units, 300 = 15 units   Luis Newman DO   losartan (Cozaar) 50 mg tablet Take 1 tablet (50 mg) by mouth once daily.   Luis Newman DO   metoprolol succinate XL (Toprol-XL) 100 mg 24 hr tablet Take 1 tablet (100 mg) by mouth once daily at bedtime. Do not crush or chew.   Luis Newman DO   metoprolol succinate XL (Toprol-XL) 50 mg 24 hr  tablet Take 1 tablet (50 mg) by mouth once daily in the morning. Take before meals. Do not crush or chew.   Luis Newman DO   predniSONE (Deltasone) 10 mg tablet 3  P. O .   For 2 days ,   then  1  P.O.  BID   for 3  days  then 1  daily  for  5 days. 12/22/24  Luis Newman DO   rosuvastatin (Crestor) 10 mg tablet Take 1 tablet (10 mg) by mouth once daily.   Luis Newman DO   SITagliptin phos-metformin (Janumet) 50-1,000 mg tablet Take 1 tablet by mouth 2 times a day.   Luis Newman DO   tamsulosin (Flomax) 0.4 mg 24 hr capsule Take 1 capsule (0.4 mg) by mouth once daily at bedtime.   Luis Newman DO   warfarin (Coumadin) 5 mg tablet Take 1 tablet (5 mg) by mouth once a day on Monday, Tuesday, Wednesday, Thursday, Friday, and Saturday.   Luis Newman DO   albuterol 2.5 mg /3 mL (0.083 %) nebulizer solution Take 3 mL (2.5 mg) by nebulization every 8 hours if needed for wheezing.   Luis Newman DO   clobetasol (Temovate) 0.05 % cream Apply 1 Application topically if needed.   Historical Provider, MD   nitroglycerin (Nitrostat) 0.4 mg SL tablet Place 1 tablet (0.4 mg) under the tongue every 5 minutes if needed for chest pain.   Historical Provider, MD   nystatin (Mycostatin) 100,000 unit/gram powder Apply 1 Application topically if needed for itching (in groin area).   Luis Newman DO   pramoxine HCl (CERAVE ITCH RELIEF TOP) Apply 1 Application topically if needed (itching).   Historical Provider, MD   warfarin (Coumadin) 5 mg tablet Take 0.5 tablets (2.5 mg) by mouth 1 (one) time per week. On Sunday   Historical Provider, MD        The list below reflectives the updated allergy list. Please review each documented allergy for additional clarification and justification.  Allergies  Reviewed by Celia Tijerina on 12/24/2024        Severity Reactions Comments    Clindamycin High Anaphylaxis     Codeine Low Hives, Rash     Jardiance [empagliflozin] Low Rash      Penicillins Low Hives, Rash     Sulfa (sulfonamide Antibiotics) Low Hives Other Reaction(s): Other, Unknown, Urticaria            Below are additional concerns with the patient's PTA list.      Celia Tijerina

## 2024-12-24 NOTE — ED TRIAGE NOTES
Patient BIBA from home for Nausea and vomiting. Per patient Friday he suddenly endorsed coughing, SOB and noticed he was tachypneic. He called his PCP whom started him on Doxycycline and prednisone. Today he began to to have N/V and diarrhea x3 episodes. Patient has a HX of Afib, DM and Asthma.

## 2024-12-25 LAB
ANION GAP SERPL CALC-SCNC: 14 MMOL/L (ref 10–20)
BACTERIA UR CULT: NO GROWTH
BASOPHILS # BLD AUTO: 0.05 X10*3/UL (ref 0–0.1)
BASOPHILS NFR BLD AUTO: 0.7 %
BUN SERPL-MCNC: 19 MG/DL (ref 6–23)
CALCIUM SERPL-MCNC: 8.1 MG/DL (ref 8.6–10.3)
CHLORIDE SERPL-SCNC: 105 MMOL/L (ref 98–107)
CO2 SERPL-SCNC: 24 MMOL/L (ref 21–32)
CREAT SERPL-MCNC: 1.17 MG/DL (ref 0.5–1.3)
EGFRCR SERPLBLD CKD-EPI 2021: 61 ML/MIN/1.73M*2
EOSINOPHIL # BLD AUTO: 0.1 X10*3/UL (ref 0–0.4)
EOSINOPHIL NFR BLD AUTO: 1.4 %
ERYTHROCYTE [DISTWIDTH] IN BLOOD BY AUTOMATED COUNT: 15.8 % (ref 11.5–14.5)
GLUCOSE BLD MANUAL STRIP-MCNC: 148 MG/DL (ref 74–99)
GLUCOSE BLD MANUAL STRIP-MCNC: 210 MG/DL (ref 74–99)
GLUCOSE BLD MANUAL STRIP-MCNC: 283 MG/DL (ref 74–99)
GLUCOSE BLD MANUAL STRIP-MCNC: 292 MG/DL (ref 74–99)
GLUCOSE SERPL-MCNC: 145 MG/DL (ref 74–99)
HCT VFR BLD AUTO: 33.2 % (ref 41–52)
HGB BLD-MCNC: 10.3 G/DL (ref 13.5–17.5)
IMM GRANULOCYTES # BLD AUTO: 0.02 X10*3/UL (ref 0–0.5)
IMM GRANULOCYTES NFR BLD AUTO: 0.3 % (ref 0–0.9)
INR PPP: 3.6 (ref 0.9–1.1)
LEGIONELLA AG UR QL: NEGATIVE
LYMPHOCYTES # BLD AUTO: 1.16 X10*3/UL (ref 0.8–3)
LYMPHOCYTES NFR BLD AUTO: 16.7 %
MCH RBC QN AUTO: 27.3 PG (ref 26–34)
MCHC RBC AUTO-ENTMCNC: 31 G/DL (ref 32–36)
MCV RBC AUTO: 88 FL (ref 80–100)
MONOCYTES # BLD AUTO: 0.84 X10*3/UL (ref 0.05–0.8)
MONOCYTES NFR BLD AUTO: 12.1 %
NEUTROPHILS # BLD AUTO: 4.79 X10*3/UL (ref 1.6–5.5)
NEUTROPHILS NFR BLD AUTO: 68.8 %
NRBC BLD-RTO: 0 /100 WBCS (ref 0–0)
PLATELET # BLD AUTO: 119 X10*3/UL (ref 150–450)
POTASSIUM SERPL-SCNC: 4 MMOL/L (ref 3.5–5.3)
PROTHROMBIN TIME: 40.6 SECONDS (ref 9.8–12.8)
RBC # BLD AUTO: 3.77 X10*6/UL (ref 4.5–5.9)
S PNEUM AG UR QL: NEGATIVE
SODIUM SERPL-SCNC: 139 MMOL/L (ref 136–145)
WBC # BLD AUTO: 7 X10*3/UL (ref 4.4–11.3)

## 2024-12-25 PROCEDURE — 94640 AIRWAY INHALATION TREATMENT: CPT

## 2024-12-25 PROCEDURE — 99223 1ST HOSP IP/OBS HIGH 75: CPT | Performed by: STUDENT IN AN ORGANIZED HEALTH CARE EDUCATION/TRAINING PROGRAM

## 2024-12-25 PROCEDURE — 1200000002 HC GENERAL ROOM WITH TELEMETRY DAILY

## 2024-12-25 PROCEDURE — 2500000002 HC RX 250 W HCPCS SELF ADMINISTERED DRUGS (ALT 637 FOR MEDICARE OP, ALT 636 FOR OP/ED): Performed by: NURSE PRACTITIONER

## 2024-12-25 PROCEDURE — 36415 COLL VENOUS BLD VENIPUNCTURE: CPT | Performed by: NURSE PRACTITIONER

## 2024-12-25 PROCEDURE — 85025 COMPLETE CBC W/AUTO DIFF WBC: CPT | Performed by: NURSE PRACTITIONER

## 2024-12-25 PROCEDURE — 87493 C DIFF AMPLIFIED PROBE: CPT | Mod: PARLAB | Performed by: NURSE PRACTITIONER

## 2024-12-25 PROCEDURE — 2500000001 HC RX 250 WO HCPCS SELF ADMINISTERED DRUGS (ALT 637 FOR MEDICARE OP): Performed by: NURSE PRACTITIONER

## 2024-12-25 PROCEDURE — 2500000002 HC RX 250 W HCPCS SELF ADMINISTERED DRUGS (ALT 637 FOR MEDICARE OP, ALT 636 FOR OP/ED): Performed by: STUDENT IN AN ORGANIZED HEALTH CARE EDUCATION/TRAINING PROGRAM

## 2024-12-25 PROCEDURE — 2500000004 HC RX 250 GENERAL PHARMACY W/ HCPCS (ALT 636 FOR OP/ED): Performed by: NURSE PRACTITIONER

## 2024-12-25 PROCEDURE — 85610 PROTHROMBIN TIME: CPT | Performed by: NURSE PRACTITIONER

## 2024-12-25 PROCEDURE — 2500000005 HC RX 250 GENERAL PHARMACY W/O HCPCS: Performed by: STUDENT IN AN ORGANIZED HEALTH CARE EDUCATION/TRAINING PROGRAM

## 2024-12-25 PROCEDURE — 80048 BASIC METABOLIC PNL TOTAL CA: CPT | Performed by: NURSE PRACTITIONER

## 2024-12-25 PROCEDURE — 82947 ASSAY GLUCOSE BLOOD QUANT: CPT

## 2024-12-25 RX ORDER — INSULIN LISPRO 100 [IU]/ML
0-10 INJECTION, SOLUTION INTRAVENOUS; SUBCUTANEOUS
Status: DISCONTINUED | OUTPATIENT
Start: 2024-12-25 | End: 2024-12-28 | Stop reason: HOSPADM

## 2024-12-25 RX ORDER — DEXTROSE 50 % IN WATER (D50W) INTRAVENOUS SYRINGE
12.5
Status: DISCONTINUED | OUTPATIENT
Start: 2024-12-25 | End: 2024-12-28 | Stop reason: HOSPADM

## 2024-12-25 RX ORDER — INSULIN GLARGINE 100 [IU]/ML
12 INJECTION, SOLUTION SUBCUTANEOUS 2 TIMES DAILY
Status: DISCONTINUED | OUTPATIENT
Start: 2024-12-25 | End: 2024-12-28 | Stop reason: HOSPADM

## 2024-12-25 RX ORDER — DEXTROSE 50 % IN WATER (D50W) INTRAVENOUS SYRINGE
25
Status: DISCONTINUED | OUTPATIENT
Start: 2024-12-25 | End: 2024-12-28 | Stop reason: HOSPADM

## 2024-12-25 RX ADMIN — IPRATROPIUM BROMIDE AND ALBUTEROL SULFATE 3 ML: .5; 3 SOLUTION RESPIRATORY (INHALATION) at 12:00

## 2024-12-25 RX ADMIN — Medication 1 TABLET: at 20:48

## 2024-12-25 RX ADMIN — IPRATROPIUM BROMIDE AND ALBUTEROL SULFATE 3 ML: .5; 3 SOLUTION RESPIRATORY (INHALATION) at 07:08

## 2024-12-25 RX ADMIN — Medication 2 L/MIN: at 09:13

## 2024-12-25 RX ADMIN — INSULIN GLARGINE 12 UNITS: 100 INJECTION, SOLUTION SUBCUTANEOUS at 20:48

## 2024-12-25 RX ADMIN — INSULIN LISPRO 6 UNITS: 100 INJECTION, SOLUTION INTRAVENOUS; SUBCUTANEOUS at 17:09

## 2024-12-25 RX ADMIN — METHYLPREDNISOLONE SODIUM SUCCINATE 40 MG: 40 INJECTION, POWDER, FOR SOLUTION INTRAMUSCULAR; INTRAVENOUS at 21:30

## 2024-12-25 RX ADMIN — METFORMIN HYDROCHLORIDE 1000 MG: 500 TABLET ORAL at 17:09

## 2024-12-25 RX ADMIN — METOPROLOL SUCCINATE 100 MG: 50 TABLET, EXTENDED RELEASE ORAL at 20:47

## 2024-12-25 RX ADMIN — METOPROLOL SUCCINATE 50 MG: 50 TABLET, EXTENDED RELEASE ORAL at 06:55

## 2024-12-25 RX ADMIN — IPRATROPIUM BROMIDE AND ALBUTEROL SULFATE 3 ML: .5; 3 SOLUTION RESPIRATORY (INHALATION) at 19:27

## 2024-12-25 RX ADMIN — GABAPENTIN 600 MG: 300 CAPSULE ORAL at 09:17

## 2024-12-25 RX ADMIN — LOSARTAN POTASSIUM 50 MG: 50 TABLET, FILM COATED ORAL at 09:18

## 2024-12-25 RX ADMIN — METFORMIN HYDROCHLORIDE 1000 MG: 500 TABLET ORAL at 09:17

## 2024-12-25 RX ADMIN — Medication 2 L/MIN: at 07:08

## 2024-12-25 RX ADMIN — GABAPENTIN 600 MG: 300 CAPSULE ORAL at 15:38

## 2024-12-25 RX ADMIN — SITAGLIPTIN 50 MG: 50 TABLET, FILM COATED ORAL at 09:18

## 2024-12-25 RX ADMIN — ROSUVASTATIN CALCIUM 10 MG: 10 TABLET, FILM COATED ORAL at 09:18

## 2024-12-25 RX ADMIN — LEVOFLOXACIN 750 MG: 750 INJECTION, SOLUTION INTRAVENOUS at 13:28

## 2024-12-25 RX ADMIN — Medication 28 PERCENT: at 19:31

## 2024-12-25 RX ADMIN — INSULIN LISPRO 4 UNITS: 100 INJECTION, SOLUTION INTRAVENOUS; SUBCUTANEOUS at 12:26

## 2024-12-25 RX ADMIN — SITAGLIPTIN 50 MG: 50 TABLET, FILM COATED ORAL at 20:48

## 2024-12-25 RX ADMIN — CETIRIZINE HYDROCHLORIDE 10 MG: 10 TABLET, FILM COATED ORAL at 20:47

## 2024-12-25 RX ADMIN — METHYLPREDNISOLONE SODIUM SUCCINATE 40 MG: 40 INJECTION, POWDER, FOR SOLUTION INTRAMUSCULAR; INTRAVENOUS at 09:28

## 2024-12-25 RX ADMIN — Medication 1 TABLET: at 09:18

## 2024-12-25 RX ADMIN — FUROSEMIDE 40 MG: 40 TABLET ORAL at 09:18

## 2024-12-25 RX ADMIN — GABAPENTIN 600 MG: 300 CAPSULE ORAL at 20:48

## 2024-12-25 RX ADMIN — TAMSULOSIN HYDROCHLORIDE 0.4 MG: 0.4 CAPSULE ORAL at 20:47

## 2024-12-25 ASSESSMENT — COGNITIVE AND FUNCTIONAL STATUS - GENERAL
DRESSING REGULAR LOWER BODY CLOTHING: A LITTLE
TURNING FROM BACK TO SIDE WHILE IN FLAT BAD: A LITTLE
MOVING TO AND FROM BED TO CHAIR: A LITTLE
STANDING UP FROM CHAIR USING ARMS: A LITTLE
CLIMB 3 TO 5 STEPS WITH RAILING: A LOT
DAILY ACTIVITIY SCORE: 21
HELP NEEDED FOR BATHING: A LITTLE
WALKING IN HOSPITAL ROOM: A LITTLE
MOBILITY SCORE: 18
TOILETING: A LITTLE

## 2024-12-25 ASSESSMENT — ENCOUNTER SYMPTOMS
WEAKNESS: 1
CHOKING: 1
SHORTNESS OF BREATH: 1
WHEEZING: 1
NERVOUS/ANXIOUS: 0
FEVER: 1
EYES NEGATIVE: 1
CHEST TIGHTNESS: 1
FACIAL ASYMMETRY: 0
LIGHT-HEADEDNESS: 0
MUSCULOSKELETAL NEGATIVE: 1
HEMATOLOGIC/LYMPHATIC NEGATIVE: 1
ALLERGIC/IMMUNOLOGIC NEGATIVE: 1
POLYPHAGIA: 0
GASTROINTESTINAL NEGATIVE: 1
SLEEP DISTURBANCE: 0
APPETITE CHANGE: 1
CONFUSION: 0
ACTIVITY CHANGE: 1
PSYCHIATRIC NEGATIVE: 1
ENDOCRINE NEGATIVE: 1
WOUND: 0
DIZZINESS: 0
FATIGUE: 1
CARDIOVASCULAR NEGATIVE: 1

## 2024-12-25 ASSESSMENT — PAIN - FUNCTIONAL ASSESSMENT
PAIN_FUNCTIONAL_ASSESSMENT: 0-10
PAIN_FUNCTIONAL_ASSESSMENT: 0-10

## 2024-12-25 ASSESSMENT — PAIN SCALES - GENERAL
PAINLEVEL_OUTOF10: 0 - NO PAIN

## 2024-12-25 ASSESSMENT — PAIN SCALES - WONG BAKER: WONGBAKER_NUMERICALRESPONSE: NO HURT
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
## 2024-12-25 NOTE — CARE PLAN
The patient's goals for the shift include      The clinical goals for the shift include pt will  remain hemodynamically stable throught shift    Over the shift, the patient did make progress toward the following goals.

## 2024-12-26 LAB
ALBUMIN SERPL BCP-MCNC: 3.9 G/DL (ref 3.4–5)
ALP SERPL-CCNC: 58 U/L (ref 33–136)
ALT SERPL W P-5'-P-CCNC: 36 U/L (ref 10–52)
ANION GAP SERPL CALC-SCNC: 13 MMOL/L (ref 10–20)
AST SERPL W P-5'-P-CCNC: 20 U/L (ref 9–39)
BILIRUB SERPL-MCNC: 0.9 MG/DL (ref 0–1.2)
BUN SERPL-MCNC: 26 MG/DL (ref 6–23)
C DIF TOX TCDA+TCDB STL QL NAA+PROBE: NOT DETECTED
CALCIUM SERPL-MCNC: 9 MG/DL (ref 8.6–10.3)
CHLORIDE SERPL-SCNC: 101 MMOL/L (ref 98–107)
CO2 SERPL-SCNC: 27 MMOL/L (ref 21–32)
CREAT SERPL-MCNC: 1.25 MG/DL (ref 0.5–1.3)
EGFRCR SERPLBLD CKD-EPI 2021: 56 ML/MIN/1.73M*2
ERYTHROCYTE [DISTWIDTH] IN BLOOD BY AUTOMATED COUNT: 14.8 % (ref 11.5–14.5)
GLUCOSE BLD MANUAL STRIP-MCNC: 152 MG/DL (ref 74–99)
GLUCOSE BLD MANUAL STRIP-MCNC: 228 MG/DL (ref 74–99)
GLUCOSE BLD MANUAL STRIP-MCNC: 306 MG/DL (ref 74–99)
GLUCOSE BLD MANUAL STRIP-MCNC: 309 MG/DL (ref 74–99)
GLUCOSE SERPL-MCNC: 323 MG/DL (ref 74–99)
HCT VFR BLD AUTO: 35.5 % (ref 41–52)
HGB BLD-MCNC: 11.3 G/DL (ref 13.5–17.5)
INR PPP: 2.7 (ref 0.9–1.1)
MCH RBC QN AUTO: 27 PG (ref 26–34)
MCHC RBC AUTO-ENTMCNC: 31.8 G/DL (ref 32–36)
MCV RBC AUTO: 85 FL (ref 80–100)
NRBC BLD-RTO: 0 /100 WBCS (ref 0–0)
PLATELET # BLD AUTO: 135 X10*3/UL (ref 150–450)
POTASSIUM SERPL-SCNC: 4.8 MMOL/L (ref 3.5–5.3)
PROT SERPL-MCNC: 6.7 G/DL (ref 6.4–8.2)
PROTHROMBIN TIME: 30.3 SECONDS (ref 9.8–12.8)
RBC # BLD AUTO: 4.18 X10*6/UL (ref 4.5–5.9)
SODIUM SERPL-SCNC: 136 MMOL/L (ref 136–145)
WBC # BLD AUTO: 8.2 X10*3/UL (ref 4.4–11.3)

## 2024-12-26 PROCEDURE — 2500000001 HC RX 250 WO HCPCS SELF ADMINISTERED DRUGS (ALT 637 FOR MEDICARE OP): Performed by: STUDENT IN AN ORGANIZED HEALTH CARE EDUCATION/TRAINING PROGRAM

## 2024-12-26 PROCEDURE — 84075 ASSAY ALKALINE PHOSPHATASE: CPT | Performed by: NURSE PRACTITIONER

## 2024-12-26 PROCEDURE — 2500000002 HC RX 250 W HCPCS SELF ADMINISTERED DRUGS (ALT 637 FOR MEDICARE OP, ALT 636 FOR OP/ED): Performed by: NURSE PRACTITIONER

## 2024-12-26 PROCEDURE — 2500000001 HC RX 250 WO HCPCS SELF ADMINISTERED DRUGS (ALT 637 FOR MEDICARE OP): Performed by: NURSE PRACTITIONER

## 2024-12-26 PROCEDURE — 2500000004 HC RX 250 GENERAL PHARMACY W/ HCPCS (ALT 636 FOR OP/ED): Performed by: NURSE PRACTITIONER

## 2024-12-26 PROCEDURE — 85610 PROTHROMBIN TIME: CPT | Performed by: NURSE PRACTITIONER

## 2024-12-26 PROCEDURE — 85027 COMPLETE CBC AUTOMATED: CPT | Performed by: NURSE PRACTITIONER

## 2024-12-26 PROCEDURE — 99232 SBSQ HOSP IP/OBS MODERATE 35: CPT | Performed by: STUDENT IN AN ORGANIZED HEALTH CARE EDUCATION/TRAINING PROGRAM

## 2024-12-26 PROCEDURE — 94640 AIRWAY INHALATION TREATMENT: CPT

## 2024-12-26 PROCEDURE — 1200000002 HC GENERAL ROOM WITH TELEMETRY DAILY

## 2024-12-26 PROCEDURE — 82947 ASSAY GLUCOSE BLOOD QUANT: CPT

## 2024-12-26 PROCEDURE — 97161 PT EVAL LOW COMPLEX 20 MIN: CPT | Mod: GP

## 2024-12-26 PROCEDURE — 36415 COLL VENOUS BLD VENIPUNCTURE: CPT | Performed by: NURSE PRACTITIONER

## 2024-12-26 PROCEDURE — 2500000002 HC RX 250 W HCPCS SELF ADMINISTERED DRUGS (ALT 637 FOR MEDICARE OP, ALT 636 FOR OP/ED): Performed by: STUDENT IN AN ORGANIZED HEALTH CARE EDUCATION/TRAINING PROGRAM

## 2024-12-26 PROCEDURE — 97165 OT EVAL LOW COMPLEX 30 MIN: CPT | Mod: GO

## 2024-12-26 RX ORDER — LOSARTAN POTASSIUM 50 MG/1
50 TABLET ORAL ONCE
Status: COMPLETED | OUTPATIENT
Start: 2024-12-26 | End: 2024-12-26

## 2024-12-26 RX ADMIN — INSULIN GLARGINE 12 UNITS: 100 INJECTION, SOLUTION SUBCUTANEOUS at 08:26

## 2024-12-26 RX ADMIN — ROSUVASTATIN CALCIUM 10 MG: 10 TABLET, FILM COATED ORAL at 08:29

## 2024-12-26 RX ADMIN — INSULIN GLARGINE 12 UNITS: 100 INJECTION, SOLUTION SUBCUTANEOUS at 21:11

## 2024-12-26 RX ADMIN — Medication 1 TABLET: at 08:29

## 2024-12-26 RX ADMIN — INSULIN LISPRO 8 UNITS: 100 INJECTION, SOLUTION INTRAVENOUS; SUBCUTANEOUS at 12:31

## 2024-12-26 RX ADMIN — LOSARTAN POTASSIUM 50 MG: 50 TABLET, FILM COATED ORAL at 08:29

## 2024-12-26 RX ADMIN — WARFARIN SODIUM 2.5 MG: 5 TABLET ORAL at 17:01

## 2024-12-26 RX ADMIN — Medication 1 TABLET: at 21:05

## 2024-12-26 RX ADMIN — FUROSEMIDE 40 MG: 40 TABLET ORAL at 08:29

## 2024-12-26 RX ADMIN — METHYLPREDNISOLONE SODIUM SUCCINATE 40 MG: 40 INJECTION, POWDER, FOR SOLUTION INTRAMUSCULAR; INTRAVENOUS at 09:50

## 2024-12-26 RX ADMIN — GABAPENTIN 600 MG: 300 CAPSULE ORAL at 21:05

## 2024-12-26 RX ADMIN — LEVOFLOXACIN 750 MG: 750 INJECTION, SOLUTION INTRAVENOUS at 14:34

## 2024-12-26 RX ADMIN — INSULIN LISPRO 8 UNITS: 100 INJECTION, SOLUTION INTRAVENOUS; SUBCUTANEOUS at 08:23

## 2024-12-26 RX ADMIN — METFORMIN HYDROCHLORIDE 1000 MG: 500 TABLET ORAL at 08:29

## 2024-12-26 RX ADMIN — GABAPENTIN 600 MG: 300 CAPSULE ORAL at 08:29

## 2024-12-26 RX ADMIN — IPRATROPIUM BROMIDE AND ALBUTEROL SULFATE 3 ML: .5; 3 SOLUTION RESPIRATORY (INHALATION) at 13:41

## 2024-12-26 RX ADMIN — IPRATROPIUM BROMIDE AND ALBUTEROL SULFATE 3 ML: .5; 3 SOLUTION RESPIRATORY (INHALATION) at 07:09

## 2024-12-26 RX ADMIN — SITAGLIPTIN 50 MG: 50 TABLET, FILM COATED ORAL at 08:29

## 2024-12-26 RX ADMIN — METFORMIN HYDROCHLORIDE 1000 MG: 500 TABLET ORAL at 16:55

## 2024-12-26 RX ADMIN — GABAPENTIN 600 MG: 300 CAPSULE ORAL at 14:34

## 2024-12-26 RX ADMIN — TAMSULOSIN HYDROCHLORIDE 0.4 MG: 0.4 CAPSULE ORAL at 21:05

## 2024-12-26 RX ADMIN — METOPROLOL SUCCINATE 50 MG: 50 TABLET, EXTENDED RELEASE ORAL at 06:37

## 2024-12-26 RX ADMIN — INSULIN LISPRO 4 UNITS: 100 INJECTION, SOLUTION INTRAVENOUS; SUBCUTANEOUS at 16:53

## 2024-12-26 RX ADMIN — LOSARTAN POTASSIUM 50 MG: 50 TABLET, FILM COATED ORAL at 16:12

## 2024-12-26 RX ADMIN — CETIRIZINE HYDROCHLORIDE 10 MG: 10 TABLET, FILM COATED ORAL at 21:05

## 2024-12-26 RX ADMIN — SITAGLIPTIN 50 MG: 50 TABLET, FILM COATED ORAL at 21:05

## 2024-12-26 RX ADMIN — METOPROLOL SUCCINATE 100 MG: 50 TABLET, EXTENDED RELEASE ORAL at 21:04

## 2024-12-26 RX ADMIN — METHYLPREDNISOLONE SODIUM SUCCINATE 40 MG: 40 INJECTION, POWDER, FOR SOLUTION INTRAMUSCULAR; INTRAVENOUS at 21:05

## 2024-12-26 ASSESSMENT — COGNITIVE AND FUNCTIONAL STATUS - GENERAL
MOVING TO AND FROM BED TO CHAIR: A LITTLE
WALKING IN HOSPITAL ROOM: A LITTLE
MOBILITY SCORE: 23
DRESSING REGULAR LOWER BODY CLOTHING: A LITTLE
DAILY ACTIVITIY SCORE: 21
MOBILITY SCORE: 18
CLIMB 3 TO 5 STEPS WITH RAILING: A LOT
TOILETING: A LITTLE
TURNING FROM BACK TO SIDE WHILE IN FLAT BAD: A LITTLE
DAILY ACTIVITIY SCORE: 21
WALKING IN HOSPITAL ROOM: A LITTLE
HELP NEEDED FOR BATHING: A LITTLE
TURNING FROM BACK TO SIDE WHILE IN FLAT BAD: A LITTLE
DAILY ACTIVITIY SCORE: 24
CLIMB 3 TO 5 STEPS WITH RAILING: A LOT
CLIMB 3 TO 5 STEPS WITH RAILING: A LITTLE
MOVING TO AND FROM BED TO CHAIR: A LITTLE
STANDING UP FROM CHAIR USING ARMS: A LITTLE
TOILETING: A LITTLE
HELP NEEDED FOR BATHING: A LITTLE
MOBILITY SCORE: 18
DRESSING REGULAR LOWER BODY CLOTHING: A LITTLE
STANDING UP FROM CHAIR USING ARMS: A LITTLE

## 2024-12-26 ASSESSMENT — PAIN SCALES - GENERAL
PAINLEVEL_OUTOF10: 0 - NO PAIN

## 2024-12-26 ASSESSMENT — PAIN - FUNCTIONAL ASSESSMENT
PAIN_FUNCTIONAL_ASSESSMENT: 0-10

## 2024-12-26 NOTE — PROGRESS NOTES
"Trell Aguila is a 84 y.o. male on day 2 of admission presenting with Pneumonia of both lungs due to infectious organism, unspecified part of lung.    Subjective   Doing well, no issues, still couging but better       Objective     Physical Exam  Constitutional:       Appearance: Normal appearance.   HENT:      Head: Normocephalic and atraumatic.      Right Ear: Tympanic membrane and ear canal normal.      Left Ear: Tympanic membrane and ear canal normal.      Mouth/Throat:      Mouth: Mucous membranes are moist.      Pharynx: Oropharynx is clear.   Eyes:      Extraocular Movements: Extraocular movements intact.      Conjunctiva/sclera: Conjunctivae normal.      Pupils: Pupils are equal, round, and reactive to light.   Cardiovascular:      Rate and Rhythm: Normal rate and regular rhythm.      Pulses: Normal pulses.      Heart sounds: Normal heart sounds.   Pulmonary:      Effort: Pulmonary effort is normal.      Breath sounds: Normal breath sounds.   Abdominal:      General: Abdomen is flat. Bowel sounds are normal.      Palpations: Abdomen is soft.   Musculoskeletal:         General: Normal range of motion.      Cervical back: Normal range of motion and neck supple.   Skin:     General: Skin is warm and dry.      Capillary Refill: Capillary refill takes 2 to 3 seconds.   Neurological:      General: No focal deficit present.      Mental Status: He is alert and oriented to person, place, and time. Mental status is at baseline.   Psychiatric:         Mood and Affect: Mood normal.         Behavior: Behavior normal.         Thought Content: Thought content normal.         Judgment: Judgment normal.         Last Recorded Vitals  Blood pressure 140/79, pulse 74, temperature 35.6 °C (96.1 °F), resp. rate 18, height 1.702 m (5' 7\"), weight 106 kg (234 lb 9.1 oz), SpO2 96%.  Intake/Output last 3 Shifts:  I/O last 3 completed shifts:  In: 150 (1.4 mL/kg) [IV Piggyback:150]  Out: 350 (3.3 mL/kg) [Urine:350 (0.1 " mL/kg/hr)]  Weight: 106.4 kg     Relevant Results                              Assessment/Plan   Assessment & Plan  Pneumonia of both lungs due to infectious organism, unspecified part of lung    Admit to telemetry  Inpatient  Continue Levaquin  DuoNebs around-the-clock  Albuterol as needed  Mucinex  Urine for S pneumoniae and Legionella  AM CBC and BMP  20 mg IV Lasix x 1  PT/OT     Supratherapeutic INR     Likely related to recent antibiotic use  Hold Coumadin this p.m.  INR in a.m.  Resume Coumadin tomorrow  Consider close eye on INR given antibiotic administration     Diarrhea     Likely related to recent antibiotic use  Stool counts  Stool PCR/C. Difficile     Chronic conditions: Hypertension, hyperlipidemia, coronary artery disease, A-fib, CHF, diabetes     Continue home medications as listed above  Cardiac/diabetic diet  Sliding scale insulin  Hypoglycemic protocol     DVT prophylaxis     SCDs  On Coumadin        12.25:   Continue IV antibiotics for pneumonia as as ordered.  Continue to hold Coumadin is an INR 3.6.  Trend daily labs.  Start IV steroids as patient is audibly wheezing on 3 L and short of breath on exam.  Will increase Lantus to 12 units twice daily and increased sliding scale continue albuterol inhalers as ordered.  Trend labs encourage ambulation and weight oxygen as tolerated.  Trend labs patient is without diarrhea at this time.  Will consider deseeding C. difficile stool studies this afternoon  Length of stay greater than 24 hours inpatient status   plan of care discussed with attending Dr. Dennis valente     12/26: continue treatments, plans to dc tomorrow       I spent 35 minutes in the professional and overall care of this patient.      Dennis Valente, DO
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
## 2024-12-26 NOTE — PROGRESS NOTES
Occupational Therapy    Evaluation    Patient Name: Trell Aguila  MRN: [id number]  Department: [facility]  Room: [address] [place] [address]-  Today's Date: 12/26/2024  Time Calculation  Start Time: 0858  Stop Time: 0910  Time Calculation (min): 12 min        Assessment:  End of Session Communication: Bedside nurse  End of Session Patient Position: Up in chair, Alarm on (call light in reach)  Strengths: Capable of completing ADLs semi/independent, Insight into problems, Living arrangement secure, Premorbid level of function, Physical health, Support and attitude of living partners  Plan:  No Skilled OT: Independent with ADLs  OT Frequency: OT eval only  OT - OK to Discharge: Yes (from an OT standpoint)       Subjective   Current Problem:  1. Pneumonia of both lungs due to infectious organism, unspecified part of lung        2. Hypoxia          General:  General  Reason for Referral: OT eval and tx; ADLs. dx; Pneumonia of both lungs. Chest x-ray: increased pulmonary vascularity.  Referred By: Emerson  Past Medical History Relevant to Rehab: 84 y.o. male with a past medical history of hypertension, hyperlipidemia, coronary artery disease with stent, A-fib (on Coumadin), CHF, mitral regurg, and diabetes who presented today with shortness of breath.  Patient reports 5 days ago he began to have a nagging productive cough.  He he notes he reported this to his PCP, Dr. Hope, who ordered him a steroid taper and doxycycline.  He reports he was taking the medications as prescribed however today he began to feel more weak, have shivering, have labored breathing, nausea, and diarrhea so he decided to come to the emergency room for evaluation.  He denies any significant shortness of breath or chest pain.  Co-Treatment: PT  Co-Treatment Reason: for safety and to maximize therapeutic performance  Prior to Session Communication: Bedside nurse  Patient Position Received: Bed, 2 rail up, Alarm off, not on at start of session (seated EOB)  General Comment:  patient very pleasant and cooperative to participate  Precautions:  Medical Precautions: Fall precautions (OOB with assist, tele, contact plus for cdiff r/o)        Pain:  Pain Assessment  Pain Assessment:  (denies pain)    Objective   Cognition:  Overall Cognitive Status: Within Functional Limits           Home Living:  Type of Home:  (per patient report, lives with spouse, 0 [address], 1 story home)  Bathroom Shower/Tub:  (walk in shower with shower chair)  Bathroom Toilet:  (elevated toilet)  Prior Function:  Level of [place]:  (independent in ADLs PLOF. share IADLs with spouse. use of WW plof. drives. laundry in basement, spouse does.)    ADL:  ADL Comments: anticipatr MOD I for ADLs based on performance with transfers and mobility this date       Bed Mobility/Transfers: Transfers  Transfer:  (completed STS at MOD I with WW)      Functional Mobility:  Functional Mobility  Functional Mobility Performed:  (engaged in simple mobility at MOD I with support of WW)     Sensation:  Sensation Comment: reports chronic numbness/tingling in feet due to neuropathy  Strength:  Strength Comments: BUE ROM/MMT WFL for patient age as seen through transfers and mobility this date      Outcome Measures:[facility] Daily Activity  Putting on and taking off regular lower body clothing: None  Bathing (including washing, rinsing, drying): None  Putting on and taking off regular upper body clothing: None  Toileting, which includes using toilet, bedpan or urinal: None  Taking care of personal grooming such as brushing teeth: None  Eating Meals: None  Daily Activity - Total Score: 24        Education Documentation  Body Mechanics, taught by Chela Bray OT at 12/26/2024 12:29 PM.  Learner: Patient  Readiness: Acceptance  Method: Explanation  Response: Verbalizes Understanding    ADL Training, taught by Chela Bray OT at 12/26/2024 12:29 PM.  Learner: Patient  Readiness: Acceptance  Method: Explanation  Response: Verbalizes  Understanding    Education Comments  No comments found.

## 2024-12-27 ENCOUNTER — APPOINTMENT (OUTPATIENT)
Dept: PHARMACY | Facility: CLINIC | Age: 84
End: 2024-12-27
Payer: MEDICARE

## 2024-12-27 ENCOUNTER — APPOINTMENT (OUTPATIENT)
Dept: RADIOLOGY | Facility: HOSPITAL | Age: 84
DRG: 193 | End: 2024-12-27
Payer: MEDICARE

## 2024-12-27 LAB
ALBUMIN SERPL BCP-MCNC: 4.1 G/DL (ref 3.4–5)
ALP SERPL-CCNC: 57 U/L (ref 33–136)
ALT SERPL W P-5'-P-CCNC: 32 U/L (ref 10–52)
ANION GAP SERPL CALC-SCNC: 15 MMOL/L (ref 10–20)
AST SERPL W P-5'-P-CCNC: 17 U/L (ref 9–39)
BILIRUB SERPL-MCNC: 0.9 MG/DL (ref 0–1.2)
BUN SERPL-MCNC: 33 MG/DL (ref 6–23)
CALCIUM SERPL-MCNC: 9.2 MG/DL (ref 8.6–10.3)
CHLORIDE SERPL-SCNC: 102 MMOL/L (ref 98–107)
CO2 SERPL-SCNC: 24 MMOL/L (ref 21–32)
CREAT SERPL-MCNC: 1.34 MG/DL (ref 0.5–1.3)
EGFRCR SERPLBLD CKD-EPI 2021: 52 ML/MIN/1.73M*2
ERYTHROCYTE [DISTWIDTH] IN BLOOD BY AUTOMATED COUNT: 15 % (ref 11.5–14.5)
GLUCOSE BLD MANUAL STRIP-MCNC: 184 MG/DL (ref 74–99)
GLUCOSE BLD MANUAL STRIP-MCNC: 201 MG/DL (ref 74–99)
GLUCOSE BLD MANUAL STRIP-MCNC: 222 MG/DL (ref 74–99)
GLUCOSE BLD MANUAL STRIP-MCNC: 314 MG/DL (ref 74–99)
GLUCOSE SERPL-MCNC: 198 MG/DL (ref 74–99)
HCT VFR BLD AUTO: 39.1 % (ref 41–52)
HGB BLD-MCNC: 12.4 G/DL (ref 13.5–17.5)
INR PPP: 2.3 (ref 0.9–1.1)
MCH RBC QN AUTO: 27 PG (ref 26–34)
MCHC RBC AUTO-ENTMCNC: 31.7 G/DL (ref 32–36)
MCV RBC AUTO: 85 FL (ref 80–100)
NRBC BLD-RTO: 0 /100 WBCS (ref 0–0)
PLATELET # BLD AUTO: 166 X10*3/UL (ref 150–450)
POTASSIUM SERPL-SCNC: 4.2 MMOL/L (ref 3.5–5.3)
PROT SERPL-MCNC: 7 G/DL (ref 6.4–8.2)
PROTHROMBIN TIME: 26.1 SECONDS (ref 9.8–12.8)
Q ONSET: 219 MS
QRS COUNT: 16 BEATS
QRS DURATION: 146 MS
QT INTERVAL: 392 MS
QTC CALCULATION(BAZETT): 490 MS
QTC FREDERICIA: 455 MS
R AXIS: 60 DEGREES
RBC # BLD AUTO: 4.6 X10*6/UL (ref 4.5–5.9)
SODIUM SERPL-SCNC: 137 MMOL/L (ref 136–145)
T AXIS: -5 DEGREES
T OFFSET: 415 MS
VENTRICULAR RATE: 94 BPM
WBC # BLD AUTO: 11.6 X10*3/UL (ref 4.4–11.3)

## 2024-12-27 PROCEDURE — 36415 COLL VENOUS BLD VENIPUNCTURE: CPT | Performed by: NURSE PRACTITIONER

## 2024-12-27 PROCEDURE — 1200000002 HC GENERAL ROOM WITH TELEMETRY DAILY

## 2024-12-27 PROCEDURE — 85610 PROTHROMBIN TIME: CPT | Performed by: STUDENT IN AN ORGANIZED HEALTH CARE EDUCATION/TRAINING PROGRAM

## 2024-12-27 PROCEDURE — 93971 EXTREMITY STUDY: CPT

## 2024-12-27 PROCEDURE — 2500000001 HC RX 250 WO HCPCS SELF ADMINISTERED DRUGS (ALT 637 FOR MEDICARE OP): Performed by: STUDENT IN AN ORGANIZED HEALTH CARE EDUCATION/TRAINING PROGRAM

## 2024-12-27 PROCEDURE — 85027 COMPLETE CBC AUTOMATED: CPT | Performed by: NURSE PRACTITIONER

## 2024-12-27 PROCEDURE — 2500000004 HC RX 250 GENERAL PHARMACY W/ HCPCS (ALT 636 FOR OP/ED): Performed by: NURSE PRACTITIONER

## 2024-12-27 PROCEDURE — 93971 EXTREMITY STUDY: CPT | Performed by: RADIOLOGY

## 2024-12-27 PROCEDURE — 80053 COMPREHEN METABOLIC PANEL: CPT | Performed by: NURSE PRACTITIONER

## 2024-12-27 PROCEDURE — 2500000001 HC RX 250 WO HCPCS SELF ADMINISTERED DRUGS (ALT 637 FOR MEDICARE OP): Performed by: NURSE PRACTITIONER

## 2024-12-27 PROCEDURE — 99232 SBSQ HOSP IP/OBS MODERATE 35: CPT | Performed by: STUDENT IN AN ORGANIZED HEALTH CARE EDUCATION/TRAINING PROGRAM

## 2024-12-27 PROCEDURE — 2500000002 HC RX 250 W HCPCS SELF ADMINISTERED DRUGS (ALT 637 FOR MEDICARE OP, ALT 636 FOR OP/ED): Performed by: NURSE PRACTITIONER

## 2024-12-27 PROCEDURE — 94640 AIRWAY INHALATION TREATMENT: CPT

## 2024-12-27 PROCEDURE — 82947 ASSAY GLUCOSE BLOOD QUANT: CPT

## 2024-12-27 PROCEDURE — 2500000002 HC RX 250 W HCPCS SELF ADMINISTERED DRUGS (ALT 637 FOR MEDICARE OP, ALT 636 FOR OP/ED): Performed by: STUDENT IN AN ORGANIZED HEALTH CARE EDUCATION/TRAINING PROGRAM

## 2024-12-27 RX ORDER — AMLODIPINE BESYLATE 5 MG/1
5 TABLET ORAL DAILY
Status: DISCONTINUED | OUTPATIENT
Start: 2024-12-27 | End: 2024-12-28 | Stop reason: HOSPADM

## 2024-12-27 RX ADMIN — METOPROLOL SUCCINATE 50 MG: 50 TABLET, EXTENDED RELEASE ORAL at 06:03

## 2024-12-27 RX ADMIN — METOPROLOL SUCCINATE 100 MG: 50 TABLET, EXTENDED RELEASE ORAL at 21:32

## 2024-12-27 RX ADMIN — Medication 1 TABLET: at 21:33

## 2024-12-27 RX ADMIN — INSULIN LISPRO 8 UNITS: 100 INJECTION, SOLUTION INTRAVENOUS; SUBCUTANEOUS at 09:11

## 2024-12-27 RX ADMIN — GABAPENTIN 600 MG: 300 CAPSULE ORAL at 09:17

## 2024-12-27 RX ADMIN — METHYLPREDNISOLONE SODIUM SUCCINATE 40 MG: 40 INJECTION, POWDER, FOR SOLUTION INTRAMUSCULAR; INTRAVENOUS at 09:19

## 2024-12-27 RX ADMIN — METFORMIN HYDROCHLORIDE 1000 MG: 500 TABLET ORAL at 09:17

## 2024-12-27 RX ADMIN — CETIRIZINE HYDROCHLORIDE 10 MG: 10 TABLET, FILM COATED ORAL at 21:34

## 2024-12-27 RX ADMIN — IPRATROPIUM BROMIDE AND ALBUTEROL SULFATE 3 ML: .5; 3 SOLUTION RESPIRATORY (INHALATION) at 19:59

## 2024-12-27 RX ADMIN — IPRATROPIUM BROMIDE AND ALBUTEROL SULFATE 3 ML: .5; 3 SOLUTION RESPIRATORY (INHALATION) at 12:04

## 2024-12-27 RX ADMIN — WARFARIN SODIUM 2.5 MG: 5 TABLET ORAL at 17:00

## 2024-12-27 RX ADMIN — LEVOFLOXACIN 750 MG: 750 INJECTION, SOLUTION INTRAVENOUS at 13:33

## 2024-12-27 RX ADMIN — INSULIN GLARGINE 12 UNITS: 100 INJECTION, SOLUTION SUBCUTANEOUS at 21:39

## 2024-12-27 RX ADMIN — SITAGLIPTIN 50 MG: 50 TABLET, FILM COATED ORAL at 09:17

## 2024-12-27 RX ADMIN — AMLODIPINE BESYLATE 5 MG: 5 TABLET ORAL at 11:05

## 2024-12-27 RX ADMIN — INSULIN LISPRO 4 UNITS: 100 INJECTION, SOLUTION INTRAVENOUS; SUBCUTANEOUS at 16:53

## 2024-12-27 RX ADMIN — Medication 1 TABLET: at 09:17

## 2024-12-27 RX ADMIN — FUROSEMIDE 40 MG: 40 TABLET ORAL at 09:17

## 2024-12-27 RX ADMIN — INSULIN LISPRO 2 UNITS: 100 INJECTION, SOLUTION INTRAVENOUS; SUBCUTANEOUS at 12:21

## 2024-12-27 RX ADMIN — IPRATROPIUM BROMIDE AND ALBUTEROL SULFATE 3 ML: .5; 3 SOLUTION RESPIRATORY (INHALATION) at 06:43

## 2024-12-27 RX ADMIN — GABAPENTIN 600 MG: 300 CAPSULE ORAL at 21:34

## 2024-12-27 RX ADMIN — INSULIN GLARGINE 12 UNITS: 100 INJECTION, SOLUTION SUBCUTANEOUS at 09:13

## 2024-12-27 RX ADMIN — LOSARTAN POTASSIUM 50 MG: 50 TABLET, FILM COATED ORAL at 09:17

## 2024-12-27 RX ADMIN — GABAPENTIN 600 MG: 300 CAPSULE ORAL at 14:23

## 2024-12-27 RX ADMIN — ROSUVASTATIN CALCIUM 10 MG: 10 TABLET, FILM COATED ORAL at 09:17

## 2024-12-27 RX ADMIN — METFORMIN HYDROCHLORIDE 1000 MG: 500 TABLET ORAL at 16:55

## 2024-12-27 RX ADMIN — TAMSULOSIN HYDROCHLORIDE 0.4 MG: 0.4 CAPSULE ORAL at 21:33

## 2024-12-27 RX ADMIN — SITAGLIPTIN 50 MG: 50 TABLET, FILM COATED ORAL at 21:33

## 2024-12-27 ASSESSMENT — COGNITIVE AND FUNCTIONAL STATUS - GENERAL
WALKING IN HOSPITAL ROOM: A LITTLE
MOBILITY SCORE: 18
STANDING UP FROM CHAIR USING ARMS: A LITTLE
DRESSING REGULAR LOWER BODY CLOTHING: A LITTLE
TURNING FROM BACK TO SIDE WHILE IN FLAT BAD: A LITTLE
TOILETING: A LITTLE
DAILY ACTIVITIY SCORE: 21
MOVING TO AND FROM BED TO CHAIR: A LITTLE
HELP NEEDED FOR BATHING: A LITTLE
CLIMB 3 TO 5 STEPS WITH RAILING: A LOT

## 2024-12-27 ASSESSMENT — PAIN SCALES - GENERAL
PAINLEVEL_OUTOF10: 0 - NO PAIN
PAINLEVEL_OUTOF10: 0 - NO PAIN

## 2024-12-27 ASSESSMENT — PAIN - FUNCTIONAL ASSESSMENT: PAIN_FUNCTIONAL_ASSESSMENT: 0-10

## 2024-12-27 NOTE — PROGRESS NOTES
"Trell Aguila is a 84 y.o. male on day 3 of admission presenting with Pneumonia of both lungs due to infectious organism, unspecified part of lung.    Subjective   Doing well, no issues, still couging but better       Objective     Physical Exam  Constitutional:       Appearance: Normal appearance.   HENT:      Head: Normocephalic and atraumatic.      Right Ear: Tympanic membrane and ear canal normal.      Left Ear: Tympanic membrane and ear canal normal.      Mouth/Throat:      Mouth: Mucous membranes are moist.      Pharynx: Oropharynx is clear.   Eyes:      Extraocular Movements: Extraocular movements intact.      Conjunctiva/sclera: Conjunctivae normal.      Pupils: Pupils are equal, round, and reactive to light.   Cardiovascular:      Rate and Rhythm: Normal rate and regular rhythm.      Pulses: Normal pulses.      Heart sounds: Normal heart sounds.   Pulmonary:      Effort: Pulmonary effort is normal.      Breath sounds: Normal breath sounds.   Abdominal:      General: Abdomen is flat. Bowel sounds are normal.      Palpations: Abdomen is soft.   Musculoskeletal:         General: Normal range of motion.      Cervical back: Normal range of motion and neck supple.   Skin:     General: Skin is warm and dry.      Capillary Refill: Capillary refill takes 2 to 3 seconds.   Neurological:      General: No focal deficit present.      Mental Status: He is alert and oriented to person, place, and time. Mental status is at baseline.   Psychiatric:         Mood and Affect: Mood normal.         Behavior: Behavior normal.         Thought Content: Thought content normal.         Judgment: Judgment normal.         Last Recorded Vitals  Blood pressure 130/59, pulse 86, temperature (!) 0.3 °C (32.5 °F), resp. rate 16, height 1.702 m (5' 7\"), weight 106 kg (234 lb 9.1 oz), SpO2 93%.  Intake/Output last 3 Shifts:  I/O last 3 completed shifts:  In: 375 (3.5 mL/kg) [P.O.:225; IV Piggyback:150]  Out: - (0 mL/kg)   Weight: 106.4 kg "     Relevant Results                              Assessment/Plan   Assessment & Plan  Pneumonia of both lungs due to infectious organism, unspecified part of lung    Admit to telemetry  Inpatient  Continue Levaquin  DuoNebs around-the-clock  Albuterol as needed  Mucinex  Urine for S pneumoniae and Legionella  AM CBC and BMP  20 mg IV Lasix x 1  PT/OT     Supratherapeutic INR     Likely related to recent antibiotic use  Hold Coumadin this p.m.  INR in a.m.  Resume Coumadin tomorrow  Consider close eye on INR given antibiotic administration     Diarrhea     Likely related to recent antibiotic use  Stool counts  Stool PCR/C. Difficile     Chronic conditions: Hypertension, hyperlipidemia, coronary artery disease, A-fib, CHF, diabetes     Continue home medications as listed above  Cardiac/diabetic diet  Sliding scale insulin  Hypoglycemic protocol     DVT prophylaxis     SCDs  On Coumadin        12.25:   Continue IV antibiotics for pneumonia as as ordered.  Continue to hold Coumadin is an INR 3.6.  Trend daily labs.  Start IV steroids as patient is audibly wheezing on 3 L and short of breath on exam.  Will increase Lantus to 12 units twice daily and increased sliding scale continue albuterol inhalers as ordered.  Trend labs encourage ambulation and weight oxygen as tolerated.  Trend labs patient is without diarrhea at this time.  Will consider deseeding C. difficile stool studies this afternoon  Length of stay greater than 24 hours inpatient status   plan of care discussed with attending Dr. Denins valente     12/26: continue treatments, plans to dc tomorrow   12/27: no issues wants another day of treatments    I spent 35 minutes in the professional and overall care of this patient.      Dennis Valente, DO

## 2024-12-27 NOTE — CARE PLAN
Problem: Patient Care Overview  Goal: Plan of Care Review  Outcome: Ongoing (interventions implemented as appropriate)  Flowsheets (Taken 3/11/2020 0650)  Plan of Care Reviewed With: patient  Outcome Summary: Heparin gtt continues; CT with steady output this shift. No reports of pain; resting well overnight. 4L humidified O2 NC. SR on the monitor. Will continue to monitor closely.      Problem: Skin Injury Risk (Adult)  Goal: Skin Health and Integrity  Outcome: Ongoing (interventions implemented as appropriate)  Flowsheets (Taken 3/11/2020 0650)  Skin Health and Integrity: making progress toward outcome     Problem: Fall Risk (Adult)  Goal: Absence of Fall  Outcome: Ongoing (interventions implemented as appropriate)  Flowsheets (Taken 3/11/2020 0650)  Absence of Fall: making progress toward outcome     Problem: VTE, DVT and PE (Adult)  Goal: Signs and Symptoms of Listed Potential Problems Will be Absent, Minimized or Managed (VTE, DVT and PE)  Outcome: Ongoing (interventions implemented as appropriate)      The patient's goals for the shift include      The clinical goals for the shift include comfort/safety    Over the shift, the patient did  make progress toward the following goals.

## 2024-12-27 NOTE — PROGRESS NOTES
12/27/24 1635   Discharge Planning   Living Arrangements Spouse/significant other   Support Systems Spouse/significant other;Family members;Friends/neighbors   Type of Residence Private residence   Number of Stairs to Enter Residence 0   Number of Stairs Within Residence 10   Expected Discharge Disposition Home   Does the patient need discharge transport arranged? No   RoundTrip coordination needed? No     I met with patient at his bedside, verified insurance and demographics.  Patient lives with his wife.  He ambulates with a cane on occasion, a walker more often.  Patient reported he has fallen 5x in the last 2 years, did not injure himself.  Patient no longer drives, his wife does all of the driving.  Patient currently declined [facility], denies homegoing needs.  Care Coordination team following for assistance with discharge planning as needed.  Per patient ADOD tomorrow.  Alli MURO TCC

## 2024-12-28 VITALS
OXYGEN SATURATION: 95 % | HEART RATE: 75 BPM | HEIGHT: 67 IN | RESPIRATION RATE: 20 BRPM | TEMPERATURE: 97.3 F | DIASTOLIC BLOOD PRESSURE: 62 MMHG | BODY MASS INDEX: 36.82 KG/M2 | SYSTOLIC BLOOD PRESSURE: 127 MMHG | WEIGHT: 234.57 LBS

## 2024-12-28 LAB
ALBUMIN SERPL BCP-MCNC: 3.9 G/DL (ref 3.4–5)
ALP SERPL-CCNC: 53 U/L (ref 33–136)
ALT SERPL W P-5'-P-CCNC: 27 U/L (ref 10–52)
ANION GAP SERPL CALC-SCNC: 13 MMOL/L (ref 10–20)
AST SERPL W P-5'-P-CCNC: 15 U/L (ref 9–39)
BILIRUB SERPL-MCNC: 0.7 MG/DL (ref 0–1.2)
BUN SERPL-MCNC: 37 MG/DL (ref 6–23)
CALCIUM SERPL-MCNC: 9.2 MG/DL (ref 8.6–10.3)
CHLORIDE SERPL-SCNC: 101 MMOL/L (ref 98–107)
CO2 SERPL-SCNC: 28 MMOL/L (ref 21–32)
CREAT SERPL-MCNC: 1.42 MG/DL (ref 0.5–1.3)
EGFRCR SERPLBLD CKD-EPI 2021: 48 ML/MIN/1.73M*2
ERYTHROCYTE [DISTWIDTH] IN BLOOD BY AUTOMATED COUNT: 15.3 % (ref 11.5–14.5)
GLUCOSE BLD MANUAL STRIP-MCNC: 185 MG/DL (ref 74–99)
GLUCOSE BLD MANUAL STRIP-MCNC: 256 MG/DL (ref 74–99)
GLUCOSE SERPL-MCNC: 209 MG/DL (ref 74–99)
HCT VFR BLD AUTO: 38.6 % (ref 41–52)
HGB BLD-MCNC: 12.1 G/DL (ref 13.5–17.5)
INR PPP: 2.5 (ref 0.9–1.1)
MCH RBC QN AUTO: 27.1 PG (ref 26–34)
MCHC RBC AUTO-ENTMCNC: 31.3 G/DL (ref 32–36)
MCV RBC AUTO: 87 FL (ref 80–100)
NRBC BLD-RTO: 0.2 /100 WBCS (ref 0–0)
PLATELET # BLD AUTO: 177 X10*3/UL (ref 150–450)
POTASSIUM SERPL-SCNC: 4.1 MMOL/L (ref 3.5–5.3)
PROT SERPL-MCNC: 6.7 G/DL (ref 6.4–8.2)
PROTHROMBIN TIME: 28.9 SECONDS (ref 9.8–12.8)
RBC # BLD AUTO: 4.46 X10*6/UL (ref 4.5–5.9)
SODIUM SERPL-SCNC: 138 MMOL/L (ref 136–145)
WBC # BLD AUTO: 10.4 X10*3/UL (ref 4.4–11.3)

## 2024-12-28 PROCEDURE — 94640 AIRWAY INHALATION TREATMENT: CPT

## 2024-12-28 PROCEDURE — 2500000002 HC RX 250 W HCPCS SELF ADMINISTERED DRUGS (ALT 637 FOR MEDICARE OP, ALT 636 FOR OP/ED): Performed by: STUDENT IN AN ORGANIZED HEALTH CARE EDUCATION/TRAINING PROGRAM

## 2024-12-28 PROCEDURE — 2500000002 HC RX 250 W HCPCS SELF ADMINISTERED DRUGS (ALT 637 FOR MEDICARE OP, ALT 636 FOR OP/ED): Performed by: NURSE PRACTITIONER

## 2024-12-28 PROCEDURE — 85610 PROTHROMBIN TIME: CPT | Performed by: STUDENT IN AN ORGANIZED HEALTH CARE EDUCATION/TRAINING PROGRAM

## 2024-12-28 PROCEDURE — 80053 COMPREHEN METABOLIC PANEL: CPT | Performed by: NURSE PRACTITIONER

## 2024-12-28 PROCEDURE — 99238 HOSP IP/OBS DSCHRG MGMT 30/<: CPT | Performed by: STUDENT IN AN ORGANIZED HEALTH CARE EDUCATION/TRAINING PROGRAM

## 2024-12-28 PROCEDURE — 82947 ASSAY GLUCOSE BLOOD QUANT: CPT

## 2024-12-28 PROCEDURE — 36415 COLL VENOUS BLD VENIPUNCTURE: CPT | Performed by: NURSE PRACTITIONER

## 2024-12-28 PROCEDURE — 2500000001 HC RX 250 WO HCPCS SELF ADMINISTERED DRUGS (ALT 637 FOR MEDICARE OP): Performed by: NURSE PRACTITIONER

## 2024-12-28 PROCEDURE — 85027 COMPLETE CBC AUTOMATED: CPT | Performed by: NURSE PRACTITIONER

## 2024-12-28 PROCEDURE — 2500000001 HC RX 250 WO HCPCS SELF ADMINISTERED DRUGS (ALT 637 FOR MEDICARE OP): Performed by: STUDENT IN AN ORGANIZED HEALTH CARE EDUCATION/TRAINING PROGRAM

## 2024-12-28 RX ORDER — AMLODIPINE BESYLATE 5 MG/1
5 TABLET ORAL DAILY
Qty: 30 TABLET | Refills: 0 | Status: SHIPPED | OUTPATIENT
Start: 2024-12-29

## 2024-12-28 RX ORDER — LEVOFLOXACIN 500 MG/1
500 TABLET, FILM COATED ORAL DAILY
Qty: 5 TABLET | Refills: 0 | Status: SHIPPED | OUTPATIENT
Start: 2024-12-28 | End: 2025-01-02

## 2024-12-28 RX ADMIN — SITAGLIPTIN 50 MG: 50 TABLET, FILM COATED ORAL at 08:16

## 2024-12-28 RX ADMIN — IPRATROPIUM BROMIDE AND ALBUTEROL SULFATE 3 ML: .5; 3 SOLUTION RESPIRATORY (INHALATION) at 12:14

## 2024-12-28 RX ADMIN — ROSUVASTATIN CALCIUM 10 MG: 10 TABLET, FILM COATED ORAL at 08:16

## 2024-12-28 RX ADMIN — FUROSEMIDE 40 MG: 40 TABLET ORAL at 08:16

## 2024-12-28 RX ADMIN — LOSARTAN POTASSIUM 50 MG: 50 TABLET, FILM COATED ORAL at 08:16

## 2024-12-28 RX ADMIN — INSULIN LISPRO 2 UNITS: 100 INJECTION, SOLUTION INTRAVENOUS; SUBCUTANEOUS at 08:12

## 2024-12-28 RX ADMIN — Medication 1 TABLET: at 08:15

## 2024-12-28 RX ADMIN — INSULIN GLARGINE 12 UNITS: 100 INJECTION, SOLUTION SUBCUTANEOUS at 08:14

## 2024-12-28 RX ADMIN — AMLODIPINE BESYLATE 5 MG: 5 TABLET ORAL at 08:17

## 2024-12-28 RX ADMIN — METFORMIN HYDROCHLORIDE 1000 MG: 500 TABLET ORAL at 08:16

## 2024-12-28 RX ADMIN — INSULIN LISPRO 6 UNITS: 100 INJECTION, SOLUTION INTRAVENOUS; SUBCUTANEOUS at 12:11

## 2024-12-28 RX ADMIN — GABAPENTIN 600 MG: 300 CAPSULE ORAL at 08:16

## 2024-12-28 RX ADMIN — IPRATROPIUM BROMIDE AND ALBUTEROL SULFATE 3 ML: .5; 3 SOLUTION RESPIRATORY (INHALATION) at 06:40

## 2024-12-28 RX ADMIN — METOPROLOL SUCCINATE 50 MG: 50 TABLET, EXTENDED RELEASE ORAL at 06:11

## 2024-12-28 ASSESSMENT — COGNITIVE AND FUNCTIONAL STATUS - GENERAL
HELP NEEDED FOR BATHING: A LITTLE
DRESSING REGULAR LOWER BODY CLOTHING: A LITTLE
TOILETING: A LITTLE
DAILY ACTIVITIY SCORE: 21

## 2024-12-28 ASSESSMENT — PAIN SCALES - GENERAL: PAINLEVEL_OUTOF10: 0 - NO PAIN

## 2024-12-28 NOTE — CARE PLAN
RN called and stated patient has an acute onset of right calf pain. Wife worried about a blood clot since Coumadin was held due to supratherapeutic INR. Patient was admitted on 12/24/24 with pneumonia. Right lower extremity ultrasound ordered. Attending to follow.    Update 2300:  RLE ultrasound results:    No sonographic evidence for deep vein thrombosis within the evaluated  veins of the right lower extremity.      Nonvisualization of the calf veins.      4.3 cm x 1.7 cm x 2.3 cm complex fluid collection in the right  popliteal fossa may correspond to a complex Baker's cyst.      Lower extremity arterial vascular calcifications.    -Defer to attending to have ortho consulted inpatient for Baker's cyst vs outpatient management. RN updated.

## 2024-12-28 NOTE — DISCHARGE SUMMARY
Discharge Diagnosis  Pneumonia of both lungs due to infectious organism, unspecified part of lung    Issues Requiring Follow-Up  pna    Test Results Pending At Discharge  Pending Labs       Order Current Status    Stool Pathogen Panel, PCR Collected (12/25/24 1101)            Hospital Course   Admit to telemetry  Inpatient  Continue Levaquin  DuoNebs around-the-clock  Albuterol as needed  Mucinex  Urine for S pneumoniae and Legionella  AM CBC and BMP  20 mg IV Lasix x 1  PT/OT     Supratherapeutic INR     Likely related to recent antibiotic use  Hold Coumadin this p.m.  INR in a.m.  Resume Coumadin tomorrow  Consider close eye on INR given antibiotic administration     Diarrhea     Likely related to recent antibiotic use  Stool counts  Stool PCR/C. Difficile     Chronic conditions: Hypertension, hyperlipidemia, coronary artery disease, A-fib, CHF, diabetes     Continue home medications as listed above  Cardiac/diabetic diet  Sliding scale insulin  Hypoglycemic protocol     DVT prophylaxis     SCDs  On Coumadin        12.25:   Continue IV antibiotics for pneumonia as as ordered.  Continue to hold Coumadin is an INR 3.6.  Trend daily labs.  Start IV steroids as patient is audibly wheezing on 3 L and short of breath on exam.  Will increase Lantus to 12 units twice daily and increased sliding scale continue albuterol inhalers as ordered.  Trend labs encourage ambulation and weight oxygen as tolerated.  Trend labs patient is without diarrhea at this time.  Will consider deseeding C. difficile stool studies this afternoon  Length of stay greater than 24 hours inpatient status   plan of care discussed with attending Dr. Dennis valente      12/26: continue treatments, plans to dc tomorrow   12/27: no issues wants another day of treatments     I spent 35 minutes in the professional and overall care of this patient.    Pertinent Physical Exam At Time of Discharge  Physical Exam  Constitutional:       Appearance: Normal appearance.    HENT:      Head: Normocephalic and atraumatic.      Right Ear: Tympanic membrane and ear canal normal.      Left Ear: Tympanic membrane and ear canal normal.      Mouth/Throat:      Mouth: Mucous membranes are moist.      Pharynx: Oropharynx is clear.   Eyes:      Extraocular Movements: Extraocular movements intact.      Conjunctiva/sclera: Conjunctivae normal.      Pupils: Pupils are equal, round, and reactive to light.   Cardiovascular:      Rate and Rhythm: Normal rate and regular rhythm.      Pulses: Normal pulses.      Heart sounds: Normal heart sounds.   Pulmonary:      Effort: Pulmonary effort is normal.      Breath sounds: Normal breath sounds.   Abdominal:      General: Abdomen is flat. Bowel sounds are normal.      Palpations: Abdomen is soft.   Musculoskeletal:         General: Normal range of motion.      Cervical back: Normal range of motion and neck supple.   Skin:     General: Skin is warm and dry.      Capillary Refill: Capillary refill takes 2 to 3 seconds.   Neurological:      General: No focal deficit present.      Mental Status: He is alert and oriented to person, place, and time. Mental status is at baseline.   Psychiatric:         Mood and Affect: Mood normal.         Behavior: Behavior normal.         Thought Content: Thought content normal.         Judgment: Judgment normal.         Home Medications     Medication List      START taking these medications     amLODIPine 5 mg tablet; Commonly known as: Norvasc; Take 1 tablet (5 mg)   by mouth once daily.; Start taking on: December 29, 2024   levoFLOXacin 500 mg tablet; Commonly known as: Levaquin; Take 1 tablet   (500 mg) by mouth once daily for 5 days.     CHANGE how you take these medications     furosemide 40 mg tablet; Commonly known as: Lasix; TAKE 1 TABLET BY   MOUTH TWICE DAILY; What changed: when to take this   nystatin 100,000 unit/gram powder; Commonly known as: Mycostatin; Apply   1 Application topically 2 times a day.   Miley Bruce  U-300 SoloStar 300 unit/mL (3 mL) injection; Generic drug:   insulin glargine; Inject 15 Units under the skin once daily at bedtime.   Take as directed per insulin instructions.; What changed: when to take   this, reasons to take this, additional instructions   * warfarin 5 mg tablet; Commonly known as: Coumadin; Take as directed.   If you are unsure how to take this medication, talk to your nurse or   doctor.; What changed: Another medication with the same name was changed.   Make sure you understand how and when to take each.   * warfarin 5 mg tablet; Commonly known as: Coumadin; Take as directed.   If you are unsure how to take this medication, talk to your nurse or   doctor.; Original instructions: Take 1 tablet (5 mg) by mouth once daily   at bedtime. Take as directed per After Visit Summary.; What changed: when   to take this, additional instructions  * This list has 2 medication(s) that are the same as other medications   prescribed for you. Read the directions carefully, and ask your doctor or   other care provider to review them with you.     CONTINUE taking these medications     albuterol 2.5 mg /3 mL (0.083 %) nebulizer solution; Take 3 mL (2.5 mg)   by nebulization every 8 hours if needed for wheezing.   brompheniramine-pseudoeph-DM 2-30-10 mg/5 mL syrup; Take 5 mL by mouth 3   times a day as needed for cough for up to 10 days.   CERAVE ITCH RELIEF TOP   cetirizine 10 mg tablet; Commonly known as: ZyrTEC   cholecalciferol 50 MCG (2000 UT) tablet; Commonly known as: Vitamin D-3   clobetasol 0.05 % cream; Commonly known as: Temovate   Contour Next Test Strips strip; Generic drug: blood sugar diagnostic;   USE TO TEST TWICE DAILY   doxycycline 100 mg capsule; Commonly known as: Vibramycin; Take 1   capsule (100 mg) by mouth 2 times a day for 10 days. Take with at least 8   ounces (large glass) of water, do not lie down for 30 minutes after   gabapentin 600 mg tablet; Commonly known as: Neurontin; Take 1  tablet   (600 mg) by mouth 3 times a day.   Janumet 50-1,000 mg tablet; Generic drug: SITagliptin phos-metformin;   Take 1 tablet by mouth 2 times a day.   losartan 50 mg tablet; Commonly known as: Cozaar; Take 1 tablet (50 mg)   by mouth once daily.   * metoprolol succinate  mg 24 hr tablet; Commonly known as:   Toprol-XL; Take 1 tablet (100 mg) by mouth once daily at bedtime. Do not   crush or chew.   * metoprolol succinate XL 50 mg 24 hr tablet; Commonly known as:   Toprol-XL; Take 1 tablet (50 mg) by mouth once daily in the morning. Take   before meals. Do not crush or chew.   Microlet Lancet misc; Generic drug: lancets; USE AS DIRECTED TO TEST   BLOOD SUGARS TWICE DAILY   nitroglycerin 0.4 mg SL tablet; Commonly known as: Nitrostat   predniSONE 10 mg tablet; Commonly known as: Deltasone; 3  P. O .   For 2   days ,   then  1  P.O.  BID   for 3  days  then 1  daily  for  5 days.   rosuvastatin 10 mg tablet; Commonly known as: Crestor; Take 1 tablet (10   mg) by mouth once daily.   tamsulosin 0.4 mg 24 hr capsule; Commonly known as: Flomax; Take 1   capsule (0.4 mg) by mouth once daily at bedtime.  * This list has 2 medication(s) that are the same as other medications   prescribed for you. Read the directions carefully, and ask your doctor or   other care provider to review them with you.       Outpatient Follow-Up  Future Appointments   Date Time Provider Department Center   1/15/2025 11:30 AM DO [facility] [place]   2/18/2025 11:30 AM DO [facility] [place]   3/19/2025 11:00 AM DO [facility] [place]   4/16/2025 11:30 AM DO [facility] [place]   5/21/2025 11:30 AM DO [facility] [place]   6/18/2025 11:30 AM DO [facility] [place]   6/30/2025  1:30 PM Jorge Alberto Coy MD [id number] [place]   7/16/2025 11:30 AM Luis Newman DO [facility] [place]   8/20/2025 11:30 AM Luis Newman DO  [facility] [place]   9/23/2025 11:30 AM Luis Newman DO [facility] [place]   10/21/2025 11:30 AM DO [facility] [place]   11/19/2025 11:30 AM Luis Newman DO [facility] [place]   12/16/2025 11:30 AM Luis Newman DO [facility] [place]       Dennis Norman DO

## 2024-12-30 ENCOUNTER — PATIENT OUTREACH (OUTPATIENT)
Dept: PRIMARY CARE | Facility: CLINIC | Age: 84
End: 2024-12-30
Payer: MEDICARE

## 2024-12-30 ENCOUNTER — TELEPHONE (OUTPATIENT)
Dept: PHARMACY | Facility: CLINIC | Age: 84
End: 2024-12-30
Payer: MEDICARE

## 2024-12-30 DIAGNOSIS — J18.9 PNEUMONIA OF BOTH LUNGS DUE TO INFECTIOUS ORGANISM, UNSPECIFIED PART OF LUNG: ICD-10-CM

## 2024-12-30 DIAGNOSIS — Z09 HOSPITAL DISCHARGE FOLLOW-UP: ICD-10-CM

## 2024-12-30 LAB
Q ONSET: 219 MS
QRS COUNT: 16 BEATS
QRS DURATION: 146 MS
QT INTERVAL: 392 MS
QTC CALCULATION(BAZETT): 490 MS
QTC FREDERICIA: 455 MS
R AXIS: 60 DEGREES
T AXIS: -5 DEGREES
T OFFSET: 415 MS
VENTRICULAR RATE: 94 BPM

## 2024-12-30 NOTE — PROGRESS NOTES
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  Admission Date: 12/24/24  Discharge Date: 12/28/24    PCP Appointment Date: 1/15/2025   (*Doesn't qualify for TCM- needs seen by: 1/13/25)  Specialist Appointment Date: n/a  Hospital Encounter and Summary Linked: Yes                 --See discharge assessment below for further details--      Engagement  Call Start Time: 1337 (12/30/2024  1:43 PM)    Medications  Medications reviewed with patient/caregiver?: Yes (12/30/2024  1:43 PM)  Is the patient having any side effects they believe may be caused by any medication additions or changes?: No (12/30/2024  1:43 PM)  Does the patient have all medications ordered at discharge?: Yes (12/30/2024  1:43 PM)  Care Management Interventions: No intervention needed; Provided patient education (12/30/2024  1:43 PM)  Prescription Comments: START taking these medications:     AmLODIPine 5 mg tablet; Commonly known as: Norvasc; Take 1 tablet (5 mg) by mouth once daily.; Start taking on: December 29, 2024   LevoFLOXacin 500 mg tablet; Commonly known as: Levaquin; Take 1 tablet (500 mg) by mouth once daily for 5 days.      CHANGE how you take these medications:     Furosemide 40 mg tablet; Commonly known as: Lasix; TAKE 1 TABLET BY MOUTH TWICE DAILY; What changed: when to take this   Nystatin 100,000 unit/gram powder; Commonly known as: Mycostatin; Apply 1 Application topically 2 times a day.   Toujeo Max U-300 SoloStar 300 unit/mL (3 mL) injection; Generic drug: insulin glargine; Inject 15 Units under the skin once daily at bedtime.    * Warfarin 5 mg tablet; Commonly known as: Coumadin; Take as directed.    * Warfarin 5 mg tablet; Commonly known as: Coumadin; Take as directed. If you are unsure how to take this medication, talk to your nurse or doctor.; Original instructions: Take 1  "tablet (5 mg) by mouth once daily at bedtime. Take as directed per After Visit Summary. (12/30/2024  1:43 PM)  Is the patient taking all medications as directed (includes completed medication regime)?: Yes (12/30/2024  1:43 PM)  Care Management Interventions: Provided patient education (12/30/2024  1:43 PM)  Medication Comments: See medication list. Picked up from [facility] pharmacy after discharge. (12/30/2024  1:43 PM)    Appointments  Does the patient have a primary care provider?: Yes (12/30/2024  1:43 PM)  Care Management Interventions: Verified appointment date/time/provider (12/30/2024  1:43 PM)  Has the patient kept scheduled appointments due by today?: Not applicable (12/30/2024  1:43 PM)  Care Management Interventions: Advised patient to keep appointment; Educated on importance of keeping appointment (12/30/2024  1:43 PM)    Self Management  What is the home health agency?: n/a (12/30/2024  1:43 PM)  Has home health visited the patient within 72 hours of discharge?: Not applicable (12/30/2024  1:43 PM)  What Durable Medical Equipment (DME) was ordered?: n/a (12/30/2024  1:43 PM)    Patient Teaching  Does the patient have access to their discharge instructions?: Yes (12/30/2024  1:43 PM)  Care Management Interventions: Reviewed instructions with patient (12/30/2024  1:43 PM)  What is the patient's perception of their health status since discharge?: Improving (12/30/2024  1:43 PM)  Is the patient/caregiver able to teach back the hierarchy of who to call/visit for symptoms/problems? PCP, Specialist, Home Health nurse, Urgent Care, ED, 911: Yes (12/30/2024  1:43 PM)  Patient/Caregiver Education Comments: Spoke with the patient and his wife Natalie who state the patient is home and doing \"better\" today. Both denied any acute changes or concerns in the patients condition since leaving the hospital. Discussed new medications and possible side effects of Amlodipine, such as swelling in feet/ankles. " Patient/caregiver denied any questions regarding activity/discharge instructions, medication changes or the patients hospital stay. Discussed hyperglycemia with prednisone use- patients wife able to teachback insulin/Toujeo dose and frequency without difficulty. Patients wife Natalie aware of all medication changes and need to follow up with anticoagulantion clinic for further Warfarin management. (12/30/2024  1:43 PM)    Wrap Up  Wrap Up Additional Comments: TCM initial outreach post discharge completed successfully. Patient confirmed he received his discharge summary and has all medications needed in the home. Patient denied need for DME, HHC or assistance obtaining transportation. TCM phone number was provided to the patient, with the patient encouraged to call back with any non-emergent questions or concerns. Patient verbalized his understanding and states he has no questions or concerns at this time, but will call back if needed. All follow up appts have been scheduled with the patient encouraged to keep all follow up appts to prevent re-hospitalization. (12/30/2024  1:43 PM)  Call End Time: 1342 (12/30/2024  1:43 PM)

## 2024-12-30 NOTE — DOCUMENTATION CLARIFICATION NOTE
"    PATIENT:               FABRIZIO MILLER  ACCT #:                  [id number]  MRN:                       [id number]  :                       1940  ADMIT DATE:       2024 9:24 AM  DISCH DATE:        2024 1:50 PM  RESPONDING PROVIDER #:        [id number]          PROVIDER RESPONSE TEXT:    Chronic Systolic CHF    CDI QUERY TEXT:    Clarification    Instruction:    Based on your assessment of the patient and the clinical information, please provide the requested documentation by clicking on the appropriate radio button and enter any additional information if prompted.    Question: Please further clarify the type and acuity of congestive heart failure    When answering this query, please exercise your independent professional judgment. The fact that a question is being asked, does not imply that any particular answer is desired or expected.    The patient's clinical indicators include:  Clinical Information:    84 y.o. male with a past medical history of hypertension, hyperlipidemia, coronary artery disease with stent, A-fib (on Coumadin), CHF, mitral regurg, and diabetes who presented today with shortness of breath    Clinical Indicators:    H and P, A/P -  \"Acute hypoxic respiratory failure, Vascular congestion, elevated BNP\"    CXR , \"Heart is enlarged with increased pulmonary vascularity.\"    BNP  - 647     PT note, \"CHF (congestive heart failure), NYHA class II, chronic, systolic\"    Treatment: IV lasix 20 mg x 1, lab work, CXR    Risk Factors: Hx of CHF, CAD, HLD, HTN  Options provided:  -- Acute on Chronic Systolic Congestive Heart Failure  -- Chronic Systolic CHF  -- Other - I will add my own diagnosis  -- Refer to Clinical Documentation Reviewer    Query created by: Rosemarie Mendoza on 2024 7:07 PM      Electronically signed by:  ELAN DIAZ DO 2024 10:22 AM          "

## 2024-12-31 ENCOUNTER — ANTICOAGULATION - WARFARIN VISIT (OUTPATIENT)
Dept: PHARMACY | Facility: CLINIC | Age: 84
End: 2024-12-31
Payer: MEDICARE

## 2024-12-31 DIAGNOSIS — I48.0 PAROXYSMAL ATRIAL FIBRILLATION (MULTI): Primary | ICD-10-CM

## 2024-12-31 LAB
POC INR: 3.2
POC PROTHROMBIN TIME: NORMAL

## 2024-12-31 PROCEDURE — 85610 PROTHROMBIN TIME: CPT | Mod: QW

## 2024-12-31 PROCEDURE — 99212 OFFICE O/P EST SF 10 MIN: CPT

## 2025-01-06 ENCOUNTER — APPOINTMENT (OUTPATIENT)
Dept: PHARMACY | Facility: CLINIC | Age: 85
End: 2025-01-06
Payer: MEDICARE

## 2025-01-10 ENCOUNTER — ANTICOAGULATION - WARFARIN VISIT (OUTPATIENT)
Dept: PHARMACY | Facility: CLINIC | Age: 85
End: 2025-01-10
Payer: MEDICARE

## 2025-01-10 DIAGNOSIS — I48.0 PAROXYSMAL ATRIAL FIBRILLATION (MULTI): Primary | ICD-10-CM

## 2025-01-10 LAB
POC INR: 3.3
POC PROTHROMBIN TIME: NORMAL

## 2025-01-10 PROCEDURE — 85610 PROTHROMBIN TIME: CPT | Mod: QW

## 2025-01-10 PROCEDURE — 99212 OFFICE O/P EST SF 10 MIN: CPT

## 2025-01-10 NOTE — PROGRESS NOTES
Coumadin Clinic Visit Note    Patient verified warfarin dose  No missed doses  No unusual bruising or bleeding  No changes to medications  Consistent dietary green intake  No anticipated procedures at this time, still on amlodipine . Done with levaquin tab and done with prednisone ,   INR Supratherapeutic today at 3.3, will hold today , he stopped prednsione and antibiotic , back to normal and no otc nmeds ,same weekly   No changes to warfarin dose today  Next appointment  3weeks       Alesha Steen, RebecaD

## 2025-01-15 ENCOUNTER — APPOINTMENT (OUTPATIENT)
Dept: PRIMARY CARE | Facility: CLINIC | Age: 85
End: 2025-01-15
Payer: MEDICARE

## 2025-01-15 ENCOUNTER — DOCUMENTATION (OUTPATIENT)
Dept: PRIMARY CARE | Facility: CLINIC | Age: 85
End: 2025-01-15

## 2025-01-15 VITALS
SYSTOLIC BLOOD PRESSURE: 107 MMHG | DIASTOLIC BLOOD PRESSURE: 61 MMHG | HEART RATE: 79 BPM | OXYGEN SATURATION: 95 % | BODY MASS INDEX: 34.69 KG/M2 | WEIGHT: 221 LBS | HEIGHT: 67 IN | TEMPERATURE: 97.6 F | RESPIRATION RATE: 20 BRPM

## 2025-01-15 DIAGNOSIS — E11.9 DIABETES MELLITUS WITHOUT COMPLICATION (MULTI): ICD-10-CM

## 2025-01-15 DIAGNOSIS — E55.9 VITAMIN D DEFICIENCY: ICD-10-CM

## 2025-01-15 DIAGNOSIS — R53.81 MALAISE AND FATIGUE: ICD-10-CM

## 2025-01-15 DIAGNOSIS — M71.21 BAKER'S CYST OF KNEE, RIGHT: ICD-10-CM

## 2025-01-15 DIAGNOSIS — R53.83 MALAISE AND FATIGUE: ICD-10-CM

## 2025-01-15 DIAGNOSIS — Z00.00 ROUTINE GENERAL MEDICAL EXAMINATION AT HEALTH CARE FACILITY: Primary | ICD-10-CM

## 2025-01-15 DIAGNOSIS — Z12.5 ENCOUNTER FOR SCREENING PROSTATE SPECIFIC ANTIGEN (PSA) MEASUREMENT: ICD-10-CM

## 2025-01-15 DIAGNOSIS — R82.90 ABNORMAL URINALYSIS: ICD-10-CM

## 2025-01-15 DIAGNOSIS — E78.5 DYSLIPIDEMIA: ICD-10-CM

## 2025-01-15 DIAGNOSIS — I10 BENIGN ESSENTIAL HYPERTENSION: ICD-10-CM

## 2025-01-15 LAB
25(OH)D3 SERPL-MCNC: 78 NG/ML (ref 30–100)
ALBUMIN SERPL BCP-MCNC: 3.8 G/DL (ref 3.4–5)
ALP SERPL-CCNC: 55 U/L (ref 33–136)
ALT SERPL W P-5'-P-CCNC: 19 U/L (ref 10–52)
ANION GAP SERPL CALC-SCNC: 13 MMOL/L (ref 10–20)
APPEARANCE UR: CLEAR
AST SERPL W P-5'-P-CCNC: 15 U/L (ref 9–39)
BILIRUB SERPL-MCNC: 1 MG/DL (ref 0–1.2)
BILIRUB UR QL STRIP: NEGATIVE
BUN SERPL-MCNC: 16 MG/DL (ref 6–23)
CALCIUM SERPL-MCNC: 8 MG/DL (ref 8.6–10.6)
CHLORIDE SERPL-SCNC: 103 MMOL/L (ref 98–107)
CHOLEST SERPL-MCNC: 105 MG/DL (ref 0–199)
CHOLESTEROL/HDL RATIO: 3.3
CO2 SERPL-SCNC: 28 MMOL/L (ref 21–32)
COLOR UR: YELLOW
CREAT SERPL-MCNC: 1.26 MG/DL (ref 0.5–1.3)
CREAT UR STRIP-MCNC: 100 MG/DL
EGFRCR SERPLBLD CKD-EPI 2021: 56 ML/MIN/1.73M*2
ERYTHROCYTE [DISTWIDTH] IN BLOOD BY AUTOMATED COUNT: 15.8 % (ref 11.5–14.5)
GLUCOSE SERPL-MCNC: 172 MG/DL (ref 74–99)
GLUCOSE UR STRIP-MCNC: NEGATIVE MG/DL
HBA1C MFR BLD: 8.6 % (ref 4.2–6.5)
HCT VFR BLD AUTO: 37 % (ref 41–52)
HDLC SERPL-MCNC: 31.4 MG/DL
HGB BLD-MCNC: 11.1 G/DL (ref 13.5–17.5)
HGB UR QL STRIP: ABNORMAL
KETONES UR STRIP-MCNC: NEGATIVE MG/DL
LDLC SERPL CALC-MCNC: 41 MG/DL
LEUKOCYTE ESTERASE UR QL STRIP: ABNORMAL
MCH RBC QN AUTO: 26.2 PG (ref 26–34)
MCHC RBC AUTO-ENTMCNC: 30 G/DL (ref 32–36)
MCV RBC AUTO: 88 FL (ref 80–100)
MICROALBUMIN UR TEST STR-MCNC: 30 MG/L
NITRITE UR QL STRIP: NEGATIVE
NON HDL CHOLESTEROL: 74 MG/DL (ref 0–149)
NRBC BLD-RTO: 0 /100 WBCS (ref 0–0)
PH UR STRIP: 5.5 [PH]
PLATELET # BLD AUTO: 141 X10*3/UL (ref 150–450)
POC FINGERSTICK BLOOD GLUCOSE: 170 MG/DL (ref 70–100)
POTASSIUM SERPL-SCNC: 3.9 MMOL/L (ref 3.5–5.3)
PROT SERPL-MCNC: 6.2 G/DL (ref 6.4–8.2)
PROT UR STRIP-MCNC: NEGATIVE MG/DL
PROT/CREAT UR: ABNORMAL UG/MG CREAT
PSA SERPL-MCNC: 1.92 NG/ML
RBC # BLD AUTO: 4.23 X10*6/UL (ref 4.5–5.9)
SODIUM SERPL-SCNC: 140 MMOL/L (ref 136–145)
SP GR UR STRIP.AUTO: 1.01
TRIGL SERPL-MCNC: 161 MG/DL (ref 0–149)
TSH SERPL-ACNC: 2.45 MIU/L (ref 0.44–3.98)
UROBILINOGEN UR STRIP-ACNC: 0.2 E.U./DL
VLDL: 32 MG/DL (ref 0–40)
WBC # BLD AUTO: 6.3 X10*3/UL (ref 4.4–11.3)

## 2025-01-15 PROCEDURE — 81003 URINALYSIS AUTO W/O SCOPE: CPT | Performed by: FAMILY MEDICINE

## 2025-01-15 PROCEDURE — 84443 ASSAY THYROID STIM HORMONE: CPT

## 2025-01-15 PROCEDURE — 80053 COMPREHEN METABOLIC PANEL: CPT

## 2025-01-15 PROCEDURE — 1160F RVW MEDS BY RX/DR IN RCRD: CPT | Performed by: FAMILY MEDICINE

## 2025-01-15 PROCEDURE — 99214 OFFICE O/P EST MOD 30 MIN: CPT | Performed by: FAMILY MEDICINE

## 2025-01-15 PROCEDURE — 3074F SYST BP LT 130 MM HG: CPT | Performed by: FAMILY MEDICINE

## 2025-01-15 PROCEDURE — 82962 GLUCOSE BLOOD TEST: CPT | Performed by: FAMILY MEDICINE

## 2025-01-15 PROCEDURE — 1123F ACP DISCUSS/DSCN MKR DOCD: CPT | Performed by: FAMILY MEDICINE

## 2025-01-15 PROCEDURE — 83036 HEMOGLOBIN GLYCOSYLATED A1C: CPT | Mod: CLIA WAIVED TEST | Performed by: FAMILY MEDICINE

## 2025-01-15 PROCEDURE — 1111F DSCHRG MED/CURRENT MED MERGE: CPT | Performed by: FAMILY MEDICINE

## 2025-01-15 PROCEDURE — 87086 URINE CULTURE/COLONY COUNT: CPT

## 2025-01-15 PROCEDURE — 85027 COMPLETE CBC AUTOMATED: CPT

## 2025-01-15 PROCEDURE — 1170F FXNL STATUS ASSESSED: CPT | Performed by: FAMILY MEDICINE

## 2025-01-15 PROCEDURE — 1036F TOBACCO NON-USER: CPT | Performed by: FAMILY MEDICINE

## 2025-01-15 PROCEDURE — 82570 ASSAY OF URINE CREATININE: CPT | Mod: CLIA WAIVED TEST | Performed by: FAMILY MEDICINE

## 2025-01-15 PROCEDURE — G0103 PSA SCREENING: HCPCS

## 2025-01-15 PROCEDURE — 82306 VITAMIN D 25 HYDROXY: CPT

## 2025-01-15 PROCEDURE — 80061 LIPID PANEL: CPT

## 2025-01-15 PROCEDURE — 3078F DIAST BP <80 MM HG: CPT | Performed by: FAMILY MEDICINE

## 2025-01-15 PROCEDURE — 1159F MED LIST DOCD IN RCRD: CPT | Performed by: FAMILY MEDICINE

## 2025-01-15 PROCEDURE — 1126F AMNT PAIN NOTED NONE PRSNT: CPT | Performed by: FAMILY MEDICINE

## 2025-01-15 PROCEDURE — 82043 UR ALBUMIN QUANTITATIVE: CPT | Mod: CLIA WAIVED TEST | Performed by: FAMILY MEDICINE

## 2025-01-15 ASSESSMENT — ACTIVITIES OF DAILY LIVING (ADL)
MANAGING_FINANCES: INDEPENDENT
DOING_HOUSEWORK: INDEPENDENT
GROCERY_SHOPPING: INDEPENDENT
BATHING: INDEPENDENT
DRESSING: INDEPENDENT
TAKING_MEDICATION: INDEPENDENT

## 2025-01-15 ASSESSMENT — PATIENT HEALTH QUESTIONNAIRE - PHQ9
1. LITTLE INTEREST OR PLEASURE IN DOING THINGS: NOT AT ALL
2. FEELING DOWN, DEPRESSED OR HOPELESS: NOT AT ALL
1. LITTLE INTEREST OR PLEASURE IN DOING THINGS: NOT AT ALL
2. FEELING DOWN, DEPRESSED OR HOPELESS: NOT AT ALL
SUM OF ALL RESPONSES TO PHQ9 QUESTIONS 1 AND 2: 0
SUM OF ALL RESPONSES TO PHQ9 QUESTIONS 1 AND 2: 0

## 2025-01-15 ASSESSMENT — PAIN SCALES - GENERAL: PAINLEVEL_OUTOF10: 0-NO PAIN

## 2025-01-15 ASSESSMENT — ENCOUNTER SYMPTOMS
OCCASIONAL FEELINGS OF UNSTEADINESS: 1
DEPRESSION: 0
LOSS OF SENSATION IN FEET: 1

## 2025-01-15 NOTE — PROGRESS NOTES
TCM 14 day outreach- no outreach made to the patient as the patient has a PCP appt today. Patient did not follow up with his PCP within 14 days of discharge. TCM to reassess patient needs 30 days post discharge.       PCP Appointment Date: 1/15/2025   (*Doesn't qualify for TCM- needs seen by: 1/13/25)

## 2025-01-15 NOTE — ASSESSMENT & PLAN NOTE
Orders:    POCT UA (Automated) docked device    POCT ALBUMIN RANDOM URINE DOCKED DEVICE    CBC    Comprehensive Metabolic Panel    Lipid Panel    POCT fingerstick glucose manually resulted    POCT Glycosylated Hemoglobin (HGB A1C) docked device    Thyroid Stimulating Hormone

## 2025-01-15 NOTE — PROGRESS NOTES
"Subjective   Reason for Visit: Trell Aguila is an 84 y.o. male here for a Medicare Wellness visit.     Past Medical, Surgical, and Family History reviewed and updated in chart.    Reviewed all medications by prescribing practitioner or clinical pharmacist (such as prescriptions, OTCs, herbal therapies and supplements) and documented in the medical record.    HPI  : Patient is an 84-year-old male who is in for his Medicare wellness exam and he also recently was in the hospital from December 24 December 28 for pneumonia.  Patient will answer the questions as presented by the medical assistant.  He does have a living will and medical power of .  Patient will also need to his blood sugar and A1c rechecked today, he also will give us a urine for urine microalbumin.  He has a history of asthmatic bronchitis and does get bronchitis quite easily but he also needs to use his home nebulizer machine on a regular basis which she does not do.  He only uses the machine when he is having cough or congestion and he really should use it on a daily basis.  He also had problems with his right knee and right calf and did have a venous duplex scan done but he did have a Baker's cyst and synovitis of his right knee which caused the calf swelling.  He also is on Coumadin due to paroxysmal atrial fibrillation.    Patient Care Team:  Luis Newman DO as PCP - General  Luis Newman DO as PCP - [facility] Attributed Provider  Jorge Alberto Coy MD as Consulting Physician (Cardiology)  Amy Marina as Care Manager     Review of Systems  : 10 systems were reviewed and the information is included in the HPI and no additional review of systems is indicated.    Objective   Vitals:  /61   Pulse 79   Temp 36.4 °C (97.6 °F)   Resp 20   Ht 1.702 m (5' 7\")   Wt 100 kg (221 lb)   SpO2 95%   BMI 34.61 kg/m²       Physical Exam  Vitals and nursing note reviewed.   Constitutional:       Appearance: Normal appearance. He is " obese.      Comments: Patient is alert and oriented x3.   No acute distress   HENT:      Head: Normocephalic.      Right Ear: Tympanic membrane and external ear normal.      Left Ear: Tympanic membrane and external ear normal.      Ears:      Comments: Ears are patent bilaterally and TMs are clear.     Nose: Nose normal.      Mouth/Throat:      Mouth: Mucous membranes are moist.      Pharynx: Oropharynx is clear.      Comments: Mouth is moist, tongue is midline.  No posterior pharyngeal erythema.  Eyes:      Extraocular Movements: Extraocular movements intact.      Conjunctiva/sclera: Conjunctivae normal.      Pupils: Pupils are equal, round, and reactive to light.      Comments: No visual disturbance, does have his  eyes examined once a year.   Neck:      Comments: Restricted range of motion due to arthritis.  No carotid bruits, no thyromegaly, no cervical adenopathy.    Cardiovascular:      Rate and Rhythm: Normal rate. Rhythm irregular.      Pulses: Normal pulses.      Heart sounds: Normal heart sounds.      Comments: History of paroxysmal atrial fibrillation, patient is on daily Coumadin and followed by the Coumadin clinic and also cardiology.  Patient denies chest pain and no palpitations.  Heart rhythm is stable S1 and S2 are noted.  Pulmonary:      Effort: Pulmonary effort is normal.      Breath sounds: Rhonchi present.      Comments: Patient was in the hospital from December 24 through December 28 for the treatment of pneumonia.  He also has a history of asthmatic bronchitis and does catch bronchitis quite easily.  Recovering from hospital stay and Pneumonia.   EX smoker  for 30 years.  Quit  30 years ago.  Patient does need to use his home nebulizer machine on a daily basis.  Lungs have few scattered basilar rhonchi to auscultation but no wheezing.  Abdominal:      General: Bowel sounds are normal.      Palpations: Abdomen is soft.      Comments: Abdomen is soft and  obese  , and non tender.   No flank  tenderness.  No suprapubic pain.  Positive bowel sounds .  No abdominal guarding and no rebound tenderness.   Genitourinary:     Comments: Patient denies dysuria, no hematuria,  denies flank pain.  Nocturia.    Musculoskeletal:         General: Tenderness present. Normal range of motion.      Cervical back: Normal range of motion. Tenderness present.      Right lower leg: Edema present.      Left lower leg: Edema present.      Comments: Mild swelling both ankles.    Osteoarthritis right knee with bakers cyst.  Had venous duplex scan.   Post lumbar  fusion   3  1/2  years ago.  Unsteady gait  and left foot  drop.     Skin:     General: Skin is warm.      Findings: Bruising and rash present.      Comments: Bruising and skin tears both arms.     Neurological:      General: No focal deficit present.      Mental Status: He is alert and oriented to person, place, and time. Mental status is at baseline.      Sensory: Sensory deficit present.      Motor: Weakness present.      Coordination: Coordination abnormal.      Gait: Gait abnormal.      Comments: Diabetic peripheral neuropathy.  Unsteady gait  due to foot drop and Lumbosacral disc dx.     Psychiatric:         Mood and Affect: Mood normal.         Thought Content: Thought content normal.         Judgment: Judgment normal.      Comments: Patient has OCD  mood and affect.  A lot of anxiety concerning his health issues.  Thought content and judgment are stable.  Mild vascular dementia.       PLAN : Patient is a 84-year-old male who was evaluated today for his Medicare wellness exam.  He did answer the Medicare questions as presented by the medical assistant.  He does have a living will and medical power of .  Patient's blood sugar today was 170 and his A1c was 8.6%.  The urinalysis showed a pH of 5.5, specific gravity 1.015, trace of leukocytes, negative protein, large blood, negative glucose.  The microalbumin showed albumin of 30 mg/L, creatinine 100 mg/dL, AC  ratio was 30:3 100 mg/g.  Patient otherwise is improving from his pneumonia and he is no longer coughing or wheezing.  We did review his medications and I also showed him his chest x-ray from the hospital.  Patient will follow-up in 2 to 3 months or sooner as needed.  He also follows with cardiology on a regular basis.  Patient will be notified of his other blood results in 3 days and further recommendations will be made at that time.    Assessment & Plan  Diabetes mellitus without complication (Multi)    Orders:    POCT UA (Automated) docked device    POCT ALBUMIN RANDOM URINE DOCKED DEVICE    CBC    Comprehensive Metabolic Panel    Lipid Panel    POCT fingerstick glucose manually resulted    POCT Glycosylated Hemoglobin (HGB A1C) docked device    Thyroid Stimulating Hormone    Vitamin D deficiency    Orders:    Vitamin D 25-Hydroxy,Total (for eval of Vitamin D levels)    Benign essential hypertension    Orders:    POCT UA (Automated) docked device    POCT ALBUMIN RANDOM URINE DOCKED DEVICE    CBC    Comprehensive Metabolic Panel    Lipid Panel    POCT fingerstick glucose manually resulted    POCT Glycosylated Hemoglobin (HGB A1C) docked device    Thyroid Stimulating Hormone    Dyslipidemia    Orders:    POCT UA (Automated) docked device    POCT ALBUMIN RANDOM URINE DOCKED DEVICE    CBC    Comprehensive Metabolic Panel    Lipid Panel    POCT fingerstick glucose manually resulted    POCT Glycosylated Hemoglobin (HGB A1C) docked device    Thyroid Stimulating Hormone    Malaise and fatigue    Orders:    POCT UA (Automated) docked device    POCT ALBUMIN RANDOM URINE DOCKED DEVICE    CBC    Comprehensive Metabolic Panel    Lipid Panel    POCT fingerstick glucose manually resulted    POCT Glycosylated Hemoglobin (HGB A1C) docked device    Thyroid Stimulating Hormone    Encounter for screening prostate specific antigen (PSA) measurement    Orders:    Prostate Specific Antigen, Screen    Routine general medical examination  at health care facility    Orders:    1 Year Follow Up In Primary Care - Wellness Exam; Future    Baker's cyst of knee, right

## 2025-01-16 LAB — BACTERIA UR CULT: NORMAL

## 2025-01-16 NOTE — RESULT ENCOUNTER NOTE
Sugar was up a little bit at 172        kidney function is just slightly borderline      liver function is normal          calcium was a little bit low at 8.0    he can chew  some Tums to raise the calcium        protein was a little bit low at 6.2     he can drink protein supplement like Ensure to raise the protein.          Cholesterol is normal at 105   triglycerides are borderline at 161     should be under 150    watch the fats in the diet white blood cell count is normal         borderline anemic at 11.1    should be above 13.5    he should take a vitamin with iron   daily       platelet count is a little low at 141,000    should be above 150,000     we will continue to monitor that.      Vitamin D is stable at 78         thyroid function is normal       prostate level is normal at 1.92

## 2025-01-18 ENCOUNTER — HOSPITAL ENCOUNTER (EMERGENCY)
Facility: HOSPITAL | Age: 85
Discharge: HOME | End: 2025-01-18
Attending: STUDENT IN AN ORGANIZED HEALTH CARE EDUCATION/TRAINING PROGRAM
Payer: MEDICARE

## 2025-01-18 VITALS
TEMPERATURE: 97.7 F | RESPIRATION RATE: 16 BRPM | HEART RATE: 104 BPM | SYSTOLIC BLOOD PRESSURE: 144 MMHG | OXYGEN SATURATION: 95 % | DIASTOLIC BLOOD PRESSURE: 81 MMHG

## 2025-01-18 DIAGNOSIS — R04.0 EPISTAXIS: Primary | ICD-10-CM

## 2025-01-18 LAB
BASOPHILS # BLD AUTO: 0.03 X10*3/UL (ref 0–0.1)
BASOPHILS NFR BLD AUTO: 0.7 %
EOSINOPHIL # BLD AUTO: 0.25 X10*3/UL (ref 0–0.4)
EOSINOPHIL NFR BLD AUTO: 5.6 %
ERYTHROCYTE [DISTWIDTH] IN BLOOD BY AUTOMATED COUNT: 15.5 % (ref 11.5–14.5)
HCT VFR BLD AUTO: 34.7 % (ref 41–52)
HGB BLD-MCNC: 10.6 G/DL (ref 13.5–17.5)
IMM GRANULOCYTES # BLD AUTO: 0.01 X10*3/UL (ref 0–0.5)
IMM GRANULOCYTES NFR BLD AUTO: 0.2 % (ref 0–0.9)
INR PPP: 2.2 (ref 0.9–1.1)
LYMPHOCYTES # BLD AUTO: 0.98 X10*3/UL (ref 0.8–3)
LYMPHOCYTES NFR BLD AUTO: 22 %
MCH RBC QN AUTO: 26.7 PG (ref 26–34)
MCHC RBC AUTO-ENTMCNC: 30.5 G/DL (ref 32–36)
MCV RBC AUTO: 87 FL (ref 80–100)
MONOCYTES # BLD AUTO: 0.43 X10*3/UL (ref 0.05–0.8)
MONOCYTES NFR BLD AUTO: 9.7 %
NEUTROPHILS # BLD AUTO: 2.75 X10*3/UL (ref 1.6–5.5)
NEUTROPHILS NFR BLD AUTO: 61.8 %
NRBC BLD-RTO: 0 /100 WBCS (ref 0–0)
PLATELET # BLD AUTO: 128 X10*3/UL (ref 150–450)
PROTHROMBIN TIME: 24.9 SECONDS (ref 9.8–12.8)
RBC # BLD AUTO: 3.97 X10*6/UL (ref 4.5–5.9)
WBC # BLD AUTO: 4.5 X10*3/UL (ref 4.4–11.3)

## 2025-01-18 PROCEDURE — 85610 PROTHROMBIN TIME: CPT | Performed by: STUDENT IN AN ORGANIZED HEALTH CARE EDUCATION/TRAINING PROGRAM

## 2025-01-18 PROCEDURE — 36415 COLL VENOUS BLD VENIPUNCTURE: CPT | Performed by: STUDENT IN AN ORGANIZED HEALTH CARE EDUCATION/TRAINING PROGRAM

## 2025-01-18 PROCEDURE — 99283 EMERGENCY DEPT VISIT LOW MDM: CPT | Performed by: STUDENT IN AN ORGANIZED HEALTH CARE EDUCATION/TRAINING PROGRAM

## 2025-01-18 PROCEDURE — 85025 COMPLETE CBC W/AUTO DIFF WBC: CPT | Performed by: STUDENT IN AN ORGANIZED HEALTH CARE EDUCATION/TRAINING PROGRAM

## 2025-01-18 PROCEDURE — 2500000001 HC RX 250 WO HCPCS SELF ADMINISTERED DRUGS (ALT 637 FOR MEDICARE OP): Performed by: STUDENT IN AN ORGANIZED HEALTH CARE EDUCATION/TRAINING PROGRAM

## 2025-01-18 RX ORDER — OXYMETAZOLINE HCL 0.05 %
2 SPRAY, NON-AEROSOL (ML) NASAL EVERY 12 HOURS PRN
Status: DISCONTINUED | OUTPATIENT
Start: 2025-01-18 | End: 2025-01-18 | Stop reason: HOSPADM

## 2025-01-18 RX ADMIN — OXYMETAZOLINE HYDROCHLORIDE 2 SPRAY: 0.5 SPRAY NASAL at 16:04

## 2025-01-18 ASSESSMENT — COLUMBIA-SUICIDE SEVERITY RATING SCALE - C-SSRS
1. IN THE PAST MONTH, HAVE YOU WISHED YOU WERE DEAD OR WISHED YOU COULD GO TO SLEEP AND NOT WAKE UP?: NO
2. HAVE YOU ACTUALLY HAD ANY THOUGHTS OF KILLING YOURSELF?: NO
6. HAVE YOU EVER DONE ANYTHING, STARTED TO DO ANYTHING, OR PREPARED TO DO ANYTHING TO END YOUR LIFE?: NO

## 2025-01-18 NOTE — ED PROVIDER NOTES
History of Present Illness     History provided by: Patient  Limitations to History: None  External Records Reviewed with Brief Summary: Outpatient progress note from 1/15/25 which showed chronic conditions management    HPI:  Trell Aguila is a 84 y.o. male with a past medical history of A-fib on Coumadin who presents with epistaxis.  Patient notes that he had a nosebleed that started at 2:50 PM.  States he was unable to get it controlled and came to the ER.  He was stopped on presentation.  Patient denies any lightheadedness, shortness breath or nausea/vomiting.  Notes that he did have some intermittent scabbing in his nose and thinks that where it came from.  Denies any intranasal drug use.  No recent congestions.  Possible digital trauma.    Physical Exam   Triage vitals:  T 36.5 °C (97.7 °F)  HR (!) 104  /81  RR 16  O2 95 % None (Room air)    General: Well appearing and in no acute distress.  HEENT: NCAT. PERRL. There is an abrasion of the anterior right nares. Oropharynx pink and moist.  CV: Regular rate, regular rhythm.   Resp: Normal effort.   GI: Soft.   Extremities: No lower extremity edema. 2+ radial pulses bilaterally.  Neuro: Moving all extremities bilaterally.  Psych: Appropriate mood and affect    Medical Decision Making & ED Course   Medical Decision Making:  This is a 84 y.o. male with a past medical history of A-fib on Coumadin who presents with epistaxis.  Patient had epistaxis prior to arrival here.  Does take Coumadin.  It was hemostatic on arrival.  He did have some abrasions of his anterior right naris.  Patient was given Afrin.  His INR was therapeutic.  CBC showing no major drop in hemoglobin or platelets.  Patient is advised to skip his nighttime dose of Coumadin resume tomorrow.  Also advised to follow-up with primary care physician.  Advised on using Afrin tomorrow as well.  Advised on symptomatic control at home.  Advised on return precautions and  discharged  ----    Differential diagnoses considered include but are not limited to: trauma, mass, coagulopathy, thrombocytopenia, sinusitis     Social Determinants of Health which Significantly Impact Care: None identified     EKG Independent Interpretation: EKG not obtained    Independent Result Review and Interpretation: Relevant laboratory and radiographic results were reviewed and independently interpreted by myself.  As necessary, they are commented on in the ED Course.    Chronic conditions affecting the patient's care: As documented above in [facility]    The patient was discussed with the following consultants/services: None    Care Considerations: As documented above in [facility]    ED Course:  Diagnoses as of 01/18/25 1642   Epistaxis     Disposition   As a result of the work-up, the patient was discharged home.  he was informed of his diagnosis and instructed to come back with any concerns or worsening of condition.  he and was agreeable to the plan as discussed above.  he was given the opportunity to ask questions.  All of the patient's questions were answered.    Fox Mcintyre MD  Emergency Medicine     Fox Mcintyre MD  01/18/25 0343

## 2025-01-18 NOTE — ED TRIAGE NOTES
Patient present to the emergency room c/o nosebleed at approx 1500 while patient was going to [facility]. +coumadin

## 2025-01-23 ENCOUNTER — ANTICOAGULATION - WARFARIN VISIT (OUTPATIENT)
Dept: PHARMACY | Facility: CLINIC | Age: 85
End: 2025-01-23
Payer: MEDICARE

## 2025-01-23 DIAGNOSIS — I48.0 PAROXYSMAL ATRIAL FIBRILLATION (MULTI): Primary | ICD-10-CM

## 2025-01-23 LAB
POC INR: 1.8
POC PROTHROMBIN TIME: NORMAL

## 2025-01-23 PROCEDURE — 85610 PROTHROMBIN TIME: CPT | Mod: QW | Performed by: PHARMACIST

## 2025-01-23 PROCEDURE — 99212 OFFICE O/P EST SF 10 MIN: CPT | Performed by: PHARMACIST

## 2025-01-23 NOTE — PROGRESS NOTES
Coumadin Clinic Visit Note    Patient presents today for anticoagulation monitoring and adjustment.  Patient verified warfarin dosing schedule  Patient denies missing any doses  Patient denies unusual bruising or bleeding  Patient denies changes to medications, alcohol or tobacco use.  Consistent dietary green intake  There are no anticipated procedures at this time  INR Subtherapeutic today at 1.8  Booster today 5 mg  Next appointment 3 weeks      Tabatha Delgado, PharmD

## 2025-01-28 ENCOUNTER — APPOINTMENT (OUTPATIENT)
Dept: PRIMARY CARE | Facility: CLINIC | Age: 85
End: 2025-01-28
Payer: MEDICARE

## 2025-01-28 DIAGNOSIS — D50.8 OTHER IRON DEFICIENCY ANEMIA: ICD-10-CM

## 2025-01-28 DIAGNOSIS — F41.1 GENERALIZED ANXIETY DISORDER: ICD-10-CM

## 2025-01-28 DIAGNOSIS — F01.B4 MODERATE VASCULAR DEMENTIA WITH ANXIETY: ICD-10-CM

## 2025-01-28 DIAGNOSIS — R04.0 EPISTAXIS: ICD-10-CM

## 2025-01-28 DIAGNOSIS — D69.6 THROMBOCYTOPENIA (CMS-HCC): Primary | ICD-10-CM

## 2025-01-28 DIAGNOSIS — I10 PRIMARY HYPERTENSION: ICD-10-CM

## 2025-01-28 PROBLEM — D50.9 IRON DEFICIENCY ANEMIA: Status: ACTIVE | Noted: 2025-01-28

## 2025-01-28 PROCEDURE — 1123F ACP DISCUSS/DSCN MKR DOCD: CPT | Performed by: FAMILY MEDICINE

## 2025-01-28 PROCEDURE — 1036F TOBACCO NON-USER: CPT | Performed by: FAMILY MEDICINE

## 2025-01-28 PROCEDURE — 1159F MED LIST DOCD IN RCRD: CPT | Performed by: FAMILY MEDICINE

## 2025-01-28 PROCEDURE — 1160F RVW MEDS BY RX/DR IN RCRD: CPT | Performed by: FAMILY MEDICINE

## 2025-01-28 PROCEDURE — 99214 OFFICE O/P EST MOD 30 MIN: CPT | Performed by: FAMILY MEDICINE

## 2025-01-28 NOTE — PROGRESS NOTES
Subjective   Trell Aguila is a 84 y.o. male who presents for Follow-up (f).  ER follow up  after nose bleed.     HPI  : Patient is an 84-year-old male who was in the emergency room last week for a severe nosebleed.  They did put a clamp on his nose and also used  some Afrin to shrink the blood vessels and the patient is in today for follow-up.  He will need his CBC rechecked due to the significant amount of blood that he lost and also his platelet count is low.  Patient is on warfarin and goes to the Coumadin clinic to monitor his anticoagulation medicine.  This is managed by cardiology.    Objective  : ROS : 10 systems were reviewed and the information is included in the HPI and no additional review of systems is indicated.    Physical Exam  Vitals and nursing note reviewed.   Constitutional:       Appearance: Normal appearance. He is obese.      Comments: Patient is alert and oriented x3.   No acute distress   HENT:      Head: Normocephalic.      Right Ear: Tympanic membrane and external ear normal.      Left Ear: Tympanic membrane and external ear normal.      Ears:      Comments: Ears are patent bilaterally and TMs are clear.     Nose: Congestion present.      Comments: Recent epistaxis  from right nostril .  Was in ER  and had  severe epistaxis.  Stopped now.  Patient does have Afrin nasal spray that he can put on some  cotton To use, and  also has a clip for his nose.  He should also hold his dose of Coumadin this evening.     Mouth/Throat:      Mouth: Mucous membranes are moist.      Pharynx: Oropharynx is clear.      Comments: Mouth is moist, tongue is midline.  No posterior pharyngeal erythema.  Eyes:      Extraocular Movements: Extraocular movements intact.      Conjunctiva/sclera: Conjunctivae normal.      Pupils: Pupils are equal, round, and reactive to light.      Comments: No visual disturbance, does have his eyes examined once a year.   Neck:      Comments: No carotid bruits, no thyromegaly, no  cervical adenopathy.  Occasional neck spasm and restriction of motion secondary to stress and tension.  Cardiovascular:      Rate and Rhythm: Normal rate and regular rhythm.      Pulses: Normal pulses.      Heart sounds: Normal heart sounds.      Comments: Patient follows with cardiology after several stent placements in the past.  He also is on Coumadin for paroxysmal atrial fibrillation.  Patient denies chest pain and no palpitations.  Heart rhythm is stable S1 and S2 are noted.   Pulmonary:      Effort: Pulmonary effort is normal.      Comments: Patient denies any coughing or wheezing.  Lungs are clear to auscultation.    Abdominal:      General: Bowel sounds are normal.      Palpations: Abdomen is soft.      Comments: Abdomen is soft and obese ,  but non tender.   No flank tenderness.  No suprapubic pain.    No abdominal guarding and no rebound tenderness.   Genitourinary:     Comments: Patient denies dysuria, no hematuria, denies flank pain.  Nocturia.   Musculoskeletal:         General: Tenderness present.      Cervical back: Normal range of motion.      Comments: Lumbar fusion   and back stiffness.  Age-related arthritis in the joints.  Restriction of motion cervical and lumbar spines due to arthritis and  muscle spasm.   Skin:     General: Skin is warm and dry.      Findings: Bruising present.      Comments: Bruising from blood thinners.    Epistaxis .  To recheck his thrombocytopenia.   Neurological:      General: No focal deficit present.      Mental Status: He is alert and oriented to person, place, and time. Mental status is at baseline.      Comments: No focal neurosensory deficits are noted.  Patient denies any peripheral neuropathy.  Coordination and gait are stable.  Normal muscle strength upper and lower extremities.   Psychiatric:         Judgment: Judgment normal.      Comments: Patient has OCD behavior and chronic anxiety about health problems.  He is becoming more forgetful with age and does  have some vascular dementia with anxiety.     PLAN : Patient is an 84-year-old male who was in the emergency room for a severe nosebleed several days ago.  They were able to get it stopped and he is on Coumadin which is monitored by the Coumadin clinic.  This is then referred to cardiology where they give him the dosage to take.  I was able to visualize a small bleeding vessel on the right nasal passage.  Patient can put some Afrin nasal spray on a piece of cotton and inserted to help coagulate the area.  I also recommended that he skip his Coumadin tonight.  Patient had a CBC drawn he will be notified of his and also his platelet count was low.  Hematology if this becomes more of a problem and also ENT.    Problem List Items Addressed This Visit    None           Luis Newman, DO

## 2025-01-29 LAB
ERYTHROCYTE [DISTWIDTH] IN BLOOD BY AUTOMATED COUNT: 14.8 % (ref 11–15)
HCT VFR BLD AUTO: 38.8 % (ref 38.5–50)
HGB BLD-MCNC: 11.9 G/DL (ref 13.2–17.1)
MCH RBC QN AUTO: 26.9 PG (ref 27–33)
MCHC RBC AUTO-ENTMCNC: 30.7 G/DL (ref 32–36)
MCV RBC AUTO: 87.6 FL (ref 80–100)
PLATELET # BLD AUTO: 226 THOUSAND/UL (ref 140–400)
PMV BLD REES-ECKER: 11.5 FL (ref 7.5–12.5)
RBC # BLD AUTO: 4.43 MILLION/UL (ref 4.2–5.8)
WBC # BLD AUTO: 7.3 THOUSAND/UL (ref 3.8–10.8)

## 2025-01-29 NOTE — RESULT ENCOUNTER NOTE
White blood cell count is normal.  Patient is anemic at 11.9   and should take an iron vitamin daily    platelet count was normal at 226,000.    Better than last time

## 2025-01-31 ENCOUNTER — APPOINTMENT (OUTPATIENT)
Dept: PHARMACY | Facility: CLINIC | Age: 85
End: 2025-01-31
Payer: MEDICARE

## 2025-02-04 ENCOUNTER — PATIENT OUTREACH (OUTPATIENT)
Dept: PRIMARY CARE | Facility: CLINIC | Age: 85
End: 2025-02-04
Payer: MEDICARE

## 2025-02-04 DIAGNOSIS — Z09 HOSPITAL DISCHARGE FOLLOW-UP: ICD-10-CM

## 2025-02-04 NOTE — PROGRESS NOTES
"Successful outreach made to the patients spouse Natalie regarding hospitalization as the patient continues TCM program 30 days post discharge. At time of outreach call the patients spouse feels as if the patients condition has improved since initial visit with PCP or specialist. Questions or concerns addressed at this time with caregiver- Natalie had no questions or concerns and states the patient has been \"fine\" lately. Provided TCM contact information to patient/caregiver if any further non-emergent needs arise. Natalie verbalized her understanding and denied any questions, concerns or needs from TCM at this time.   Patient has met target of no readmission for 30 days post hospital discharge and is graduated from Transitional Care Management program at this time.   "

## 2025-02-13 ENCOUNTER — ANTICOAGULATION - WARFARIN VISIT (OUTPATIENT)
Dept: PHARMACY | Facility: CLINIC | Age: 85
End: 2025-02-13
Payer: MEDICARE

## 2025-02-13 DIAGNOSIS — I48.0 PAROXYSMAL ATRIAL FIBRILLATION (MULTI): Primary | ICD-10-CM

## 2025-02-13 LAB
POC INR: 1.4
POC PROTHROMBIN TIME: NORMAL

## 2025-02-13 PROCEDURE — 85610 PROTHROMBIN TIME: CPT | Mod: QW

## 2025-02-13 PROCEDURE — 99212 OFFICE O/P EST SF 10 MIN: CPT

## 2025-02-13 RX ORDER — FERROUS SULFATE 325(65) MG
325 TABLET, DELAYED RELEASE (ENTERIC COATED) ORAL
COMMUNITY

## 2025-02-13 RX ORDER — MULTIVIT-MIN/IRON FUM/FOLIC AC 7.5 MG-4
1 TABLET ORAL DAILY
COMMUNITY

## 2025-02-13 NOTE — PROGRESS NOTES
No bleeding or unusual bruising.  Medications and doses verified.  Pt took Azithromycin 500mg Sun, Mon, Tue for dentist appt on Mon.  Started taking Centrum multivitamin and iron 65mg daily.  Also pt has been drinking Boost daily for the last month. Wife states he has only 2 more days of Boost per MD instructions.  No scheduled procedures at this time.  INR=1.4   Plan: Boost dose today and weekly dose increased by 12.5% (1/2 tab)  Follow up INR check in 2 weeks.

## 2025-02-18 ENCOUNTER — APPOINTMENT (OUTPATIENT)
Dept: PRIMARY CARE | Facility: CLINIC | Age: 85
End: 2025-02-18
Payer: MEDICARE

## 2025-02-18 VITALS
OXYGEN SATURATION: 94 % | SYSTOLIC BLOOD PRESSURE: 110 MMHG | RESPIRATION RATE: 18 BRPM | HEIGHT: 67 IN | WEIGHT: 215 LBS | DIASTOLIC BLOOD PRESSURE: 69 MMHG | TEMPERATURE: 97.6 F | BODY MASS INDEX: 33.74 KG/M2 | HEART RATE: 77 BPM

## 2025-02-18 DIAGNOSIS — E11.9 DIABETES MELLITUS WITHOUT COMPLICATION (MULTI): ICD-10-CM

## 2025-02-18 DIAGNOSIS — D50.8 OTHER IRON DEFICIENCY ANEMIA: ICD-10-CM

## 2025-02-18 DIAGNOSIS — I25.118 CORONARY ARTERY DISEASE OF NATIVE ARTERY OF NATIVE HEART WITH STABLE ANGINA PECTORIS: ICD-10-CM

## 2025-02-18 DIAGNOSIS — R04.0 NASAL BLEEDING: Primary | ICD-10-CM

## 2025-02-18 DIAGNOSIS — F41.1 GENERALIZED ANXIETY DISORDER: ICD-10-CM

## 2025-02-18 DIAGNOSIS — M54.17 LUMBOSACRAL RADICULITIS: ICD-10-CM

## 2025-02-18 DIAGNOSIS — D69.6 THROMBOCYTOPENIA (CMS-HCC): ICD-10-CM

## 2025-02-18 DIAGNOSIS — Z95.5 HISTORY OF CORONARY ARTERY STENT PLACEMENT: ICD-10-CM

## 2025-02-18 DIAGNOSIS — M21.372 LEFT FOOT DROP: ICD-10-CM

## 2025-02-18 DIAGNOSIS — Z79.01 ANTICOAGULATED ON COUMADIN: ICD-10-CM

## 2025-02-18 PROCEDURE — 3078F DIAST BP <80 MM HG: CPT | Performed by: FAMILY MEDICINE

## 2025-02-18 PROCEDURE — 1126F AMNT PAIN NOTED NONE PRSNT: CPT | Performed by: FAMILY MEDICINE

## 2025-02-18 PROCEDURE — 1160F RVW MEDS BY RX/DR IN RCRD: CPT | Performed by: FAMILY MEDICINE

## 2025-02-18 PROCEDURE — 1036F TOBACCO NON-USER: CPT | Performed by: FAMILY MEDICINE

## 2025-02-18 PROCEDURE — 1159F MED LIST DOCD IN RCRD: CPT | Performed by: FAMILY MEDICINE

## 2025-02-18 PROCEDURE — 1123F ACP DISCUSS/DSCN MKR DOCD: CPT | Performed by: FAMILY MEDICINE

## 2025-02-18 PROCEDURE — 3074F SYST BP LT 130 MM HG: CPT | Performed by: FAMILY MEDICINE

## 2025-02-18 PROCEDURE — 99214 OFFICE O/P EST MOD 30 MIN: CPT | Performed by: FAMILY MEDICINE

## 2025-02-18 ASSESSMENT — PAIN SCALES - GENERAL: PAINLEVEL_OUTOF10: 0-NO PAIN

## 2025-02-18 NOTE — PROGRESS NOTES
Subjective   Trell Aguila is a 84 y.o. male who presents for Follow-up (Follow up visit regarding previous nose bleeds).    HPI :  Patient  is an 84 year old  male with multiple medical  problems and frequent nose bleeds.  He is in for follow-up visit to see if his nose needs more cautery since he has had several episodes of epistaxis which required going to the emergency room.  He does suffer from thrombocytopenia and is also on Coumadin for life.  His last Coumadin level was a little bit low at 1.4% and now it is back up to 2.4%.  His wife has been able to use some Afrin nasal spray and cotton which is also help stop the excessive nosebleeds.  Patient is try to take some iron vitamins to replenish his anemia.  He is also diabetic but will not have his sugar checked today since it has been relatively stable.  He also suffers from hypochondriasis and constantly worries about different health problems and health issues.    Objective  : ROS :10 systems were reviewed and the information is included in the HPI and no additional review of systems is indicated.    Physical Exam  Vitals and nursing note reviewed.   Constitutional:       Appearance: Normal appearance. He is obese.      Comments: Patient is alert and oriented x3.   No acute distress   HENT:      Head: Normocephalic.      Right Ear: Tympanic membrane and external ear normal.      Left Ear: Tympanic membrane and external ear normal.      Ears:      Comments: Ears are patent bilaterally and TMs are clear.     Nose: Nose normal.      Mouth/Throat:      Mouth: Mucous membranes are moist.      Pharynx: Oropharynx is clear.      Comments: Mouth is moist, tongue is midline.  No posterior pharyngeal erythema.  Eyes:      Extraocular Movements: Extraocular movements intact.      Conjunctiva/sclera: Conjunctivae normal.      Pupils: Pupils are equal, round, and reactive to light.      Comments: No visual disturbance, does have his  eyes examined once a year.   Neck:       Comments: Restricted range of motion cervical spine due to arthritis.  No carotid bruits, no thyromegaly, no cervical adenopathy.   Cardiovascular:      Rate and Rhythm: Normal rate. Rhythm irregular.      Pulses: Normal pulses.      Heart sounds: Normal heart sounds.      Comments: Patient does have persistent atrial fibrillation and is on Coumadin.  He does follow closely with cardiology.  Patient denies chest pain and no palpitations.  Heart rhythm is irregular,  S1 and S2 are noted.  Pulmonary:      Effort: Pulmonary effort is normal.      Breath sounds: Rhonchi present.      Comments: Long history of asthmatic bronchitis and has a nebulizer machine at home that he uses.  Lungs with few scattered rhonchi to auscultation.  He always has extra phlegm and mucus that he needs to expectorate.  Abdominal:      General: Bowel sounds are normal.      Palpations: Abdomen is soft.      Comments: Abdomen is soft and obese  and difficult to evaluate.  Does have problems with occasional incontinence.  No flank tenderness.  No suprapubic pain.       Genitourinary:     Comments: Patient denies dysuria, no hematuria,  denies flank pain.  Nocturia and overactive bladder problems.  Musculoskeletal:         General: Tenderness present.      Cervical back: Tenderness present.      Comments: Patient had a lumbar spinal fusion close to 4 years ago and does have a left foot drop and ambulates with a cane due to poor coordination and balance.  Age-related arthritis in the joints.  Osteoarthritis of the shoulders , hips and knees.   Skin:     General: Skin is warm and dry.      Findings: Bruising and rash present.      Comments: Patient follows with dermatology for frequent skin rash she is.  He does have bruises and scattered skin lesions from his Coumadin and also picking at his skin.   Neurological:      General: No focal deficit present.      Mental Status: He is alert and oriented to person, place, and time. Mental status is  at baseline.      Sensory: Sensory deficit present.      Coordination: Coordination abnormal.      Comments: Abnormal coordination due to left foot drop and previous spinal fusion.  Also has unsteady gait and must use a cane at all times for his balance.  Paresthesias in the lower extremities with left leg muscle weakness.  Coordination and gait are unstable.  Decreased  muscle strength upper and lower extremities.   Psychiatric:         Behavior: Behavior normal.         Thought Content: Thought content normal.         Judgment: Judgment normal.      Comments: Patient has anxious mood and affect.  Thought content and judgment are stable.  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     PLAN :  Patient is an 84  year old  diabetic  male  in  for a  follow up on  nose bleeds . Patient  has been to the ER on several occasions and is also had his nose cauterized.  I did evaluate his nasal bleeding and he has no visible vessels today and his wife is also used cotton soaked with Afrin to stop some mild bleeding.  He also has an external clip to place on his nose which is also helped the epistaxis.  Patient's Coumadin has been adjusted by the Coumadin clinic but he still has thrombocytopenia and anemia.  He is taking iron vitamins and he will return in 3 weeks to have his blood work rechecked.  We did review his medications and blood pressure and vitals are stable.  He will follow-up in 3 to 4 weeks.    Problem List Items Addressed This Visit    None           Luis Newman,

## 2025-02-19 ENCOUNTER — APPOINTMENT (OUTPATIENT)
Dept: PRIMARY CARE | Facility: CLINIC | Age: 85
End: 2025-02-19
Payer: MEDICARE

## 2025-02-26 ENCOUNTER — APPOINTMENT (OUTPATIENT)
Dept: PHARMACY | Facility: CLINIC | Age: 85
End: 2025-02-26
Payer: MEDICARE

## 2025-02-27 ENCOUNTER — ANTICOAGULATION - WARFARIN VISIT (OUTPATIENT)
Dept: PHARMACY | Facility: CLINIC | Age: 85
End: 2025-02-27
Payer: MEDICARE

## 2025-02-27 DIAGNOSIS — I48.0 PAROXYSMAL ATRIAL FIBRILLATION (MULTI): Primary | ICD-10-CM

## 2025-02-27 LAB
POC INR: 2.2
POC PROTHROMBIN TIME: NORMAL

## 2025-02-27 PROCEDURE — 99212 OFFICE O/P EST SF 10 MIN: CPT

## 2025-02-27 PROCEDURE — 85610 PROTHROMBIN TIME: CPT | Mod: QW

## 2025-02-27 NOTE — PROGRESS NOTES
No bleeding or unusual bruising.  Medications and doses verified.  No scheduled procedures at this time.  INR=2.2   Plan: Continue same weekly dose.  Follow up INR check in 3 weeks, pt requests 4 weeks.

## 2025-03-13 ENCOUNTER — TELEPHONE (OUTPATIENT)
Dept: PHARMACY | Facility: CLINIC | Age: 85
End: 2025-03-13
Payer: MEDICARE

## 2025-03-13 NOTE — TELEPHONE ENCOUNTER
Pt went to dermatologist for a type of rash he has had for a couple years.    He was using clobetasol, but not working.  He has been prescribed doxycycline 50 mg Daily until May 16th  Pt wife wants to talk with PCP first, pt has appt with PCP next week on Wed (3/19)  Then he has appt with the clinic for inr check 1 week after that.  So pt will have been on doxy for 1 week prior to coming in for check.

## 2025-03-19 ENCOUNTER — APPOINTMENT (OUTPATIENT)
Dept: PRIMARY CARE | Facility: CLINIC | Age: 85
End: 2025-03-19
Payer: MEDICARE

## 2025-03-19 VITALS
SYSTOLIC BLOOD PRESSURE: 106 MMHG | DIASTOLIC BLOOD PRESSURE: 64 MMHG | OXYGEN SATURATION: 91 % | HEIGHT: 67 IN | RESPIRATION RATE: 18 BRPM | HEART RATE: 68 BPM | WEIGHT: 214 LBS | TEMPERATURE: 97.7 F | BODY MASS INDEX: 33.59 KG/M2

## 2025-03-19 DIAGNOSIS — E11.65 UNCONTROLLED TYPE 2 DIABETES MELLITUS WITH HYPERGLYCEMIA: ICD-10-CM

## 2025-03-19 DIAGNOSIS — Z95.5 HISTORY OF CORONARY ARTERY STENT PLACEMENT: ICD-10-CM

## 2025-03-19 DIAGNOSIS — Z79.01 ANTICOAGULATED ON COUMADIN: ICD-10-CM

## 2025-03-19 DIAGNOSIS — M17.0 PRIMARY OSTEOARTHRITIS OF BOTH KNEES: ICD-10-CM

## 2025-03-19 DIAGNOSIS — E11.42 DIABETIC POLYNEUROPATHY ASSOCIATED WITH TYPE 2 DIABETES MELLITUS (MULTI): Primary | ICD-10-CM

## 2025-03-19 DIAGNOSIS — F45.21 HYPOCHONDRIASIS: ICD-10-CM

## 2025-03-19 DIAGNOSIS — J45.40 MODERATE PERSISTENT ASTHMATIC BRONCHITIS WITHOUT COMPLICATION (HHS-HCC): ICD-10-CM

## 2025-03-19 DIAGNOSIS — E11.9 DIABETES MELLITUS WITHOUT COMPLICATION (MULTI): ICD-10-CM

## 2025-03-19 DIAGNOSIS — M21.372 LEFT FOOT DROP: ICD-10-CM

## 2025-03-19 LAB
HBA1C MFR BLD: 7.9 % (ref 4.2–6.5)
POC FINGERSTICK BLOOD GLUCOSE: 178 MG/DL (ref 70–100)

## 2025-03-19 PROCEDURE — 99214 OFFICE O/P EST MOD 30 MIN: CPT | Performed by: FAMILY MEDICINE

## 2025-03-19 PROCEDURE — 1159F MED LIST DOCD IN RCRD: CPT | Performed by: FAMILY MEDICINE

## 2025-03-19 PROCEDURE — 1036F TOBACCO NON-USER: CPT | Performed by: FAMILY MEDICINE

## 2025-03-19 PROCEDURE — 1160F RVW MEDS BY RX/DR IN RCRD: CPT | Performed by: FAMILY MEDICINE

## 2025-03-19 PROCEDURE — 1123F ACP DISCUSS/DSCN MKR DOCD: CPT | Performed by: FAMILY MEDICINE

## 2025-03-19 PROCEDURE — 82962 GLUCOSE BLOOD TEST: CPT | Performed by: FAMILY MEDICINE

## 2025-03-19 PROCEDURE — 1126F AMNT PAIN NOTED NONE PRSNT: CPT | Performed by: FAMILY MEDICINE

## 2025-03-19 PROCEDURE — 83036 HEMOGLOBIN GLYCOSYLATED A1C: CPT | Mod: CLIA WAIVED TEST | Performed by: FAMILY MEDICINE

## 2025-03-19 PROCEDURE — 3074F SYST BP LT 130 MM HG: CPT | Performed by: FAMILY MEDICINE

## 2025-03-19 PROCEDURE — 3078F DIAST BP <80 MM HG: CPT | Performed by: FAMILY MEDICINE

## 2025-03-19 ASSESSMENT — PAIN SCALES - GENERAL: PAINLEVEL_OUTOF10: 0-NO PAIN

## 2025-03-19 NOTE — PROGRESS NOTES
Subjective   Trell Aguila is a 84 y.o. male who presents for Follow-up (Follow up visit, blood pressure check and medication review today).    HPI  :   Patient is an 84-year-old diabetic male who is in for a follow-up visit and review of medication.  He also needs a recheck on his blood sugar and A1c.  Patient complains of right knee pain and osteoarthritis of the right knee.  He is waiting to see orthopedics to see if he can have gel shots.  Patient also has paroxysmal atrial fibrillation and is on Coumadin and follows at the Coumadin clinic.  He does follow with cardiology and has an appointment in June.  Patient has been using basal insulin at bedtime and he is hoping his A1c has improved.  He is not very strict with his diet and likes to snack in the evening.     Objective  : ROS :10 systems were reviewed and the information is included in the HPI and no additional review of systems is indicated.    Physical Exam  Vitals and nursing note reviewed.   Constitutional:       Appearance: Normal appearance. He is obese.      Comments: Patient is alert and oriented x3.   No acute distress but does have to ambulate with a cane due to unsteady gait.   HENT:      Head: Normocephalic.      Right Ear: Tympanic membrane and external ear normal.      Left Ear: Tympanic membrane and external ear normal.      Ears:      Comments: Ears are patent bilaterally and TMs are clear.  Decreased hearing.     Nose: Nose normal.      Mouth/Throat:      Mouth: Mucous membranes are moist.      Pharynx: Oropharynx is clear.      Comments: Mouth is moist, tongue is midline.  No posterior pharyngeal erythema.  Eyes:      Extraocular Movements: Extraocular movements intact.      Conjunctiva/sclera: Conjunctivae normal.      Pupils: Pupils are equal, round, and reactive to light.      Comments: No visual disturbance, does have his  eyes examined once a year.   Neck:      Comments: Restricted range of motion due to arthritis  .  No carotid  bruits, no thyromegaly, no cervical adenopathy.    Cardiovascular:      Rate and Rhythm: Normal rate and regular rhythm.      Pulses: Normal pulses.      Heart sounds: Normal heart sounds.      Comments: Patient denies chest pain and no palpitations.  Heart rhythm is stable S1 and S2 are noted, no ectopics.  Pulmonary:      Effort: Pulmonary effort is normal.      Breath sounds: Rhonchi present.      Comments: Patient has a long history of asthma and asthmatic bronchitis and does have a nebulizer machine at home which she should use every day.  He had pneumonia 2 months ago in December and was hospitalized.  He also has few scattered rhonchi to auscultation but no wheezing.  Abdominal:      General: Bowel sounds are normal.      Palpations: Abdomen is soft.      Comments: Abdomen is soft and mildly obese but nontender to palpation.  No flank tenderness.  No suprapubic pain.    No abdominal guarding and no rebound tenderness.   Genitourinary:     Comments: Patient denies dysuria, no hematuria, denies flank pain.  Nocturia.    Musculoskeletal:         General: Tenderness present.      Cervical back: Tenderness present.      Comments: Advanced osteoarthritis  both knees  .  Pain lower back and had spinal fusion 4 years ago.  Trouble ambulating and using a cane.  Chronic  arthritis pains in all his joints.  No ankle edema.   Skin:     General: Skin is warm.      Findings: Bruising and rash present.      Comments: Patient follows with dermatology and has bruising and skin rash on both arms.  He is on Coumadin and dermatology is starting him on doxycycline 50 mg daily.  He also needs to quit scratching and picking at his arms.   Neurological:      General: No focal deficit present.      Mental Status: He is alert and oriented to person, place, and time. Mental status is at baseline.      Sensory: Sensory deficit present.      Motor: Weakness present.      Coordination: Coordination abnormal.      Gait: Gait abnormal.       Deep Tendon Reflexes: Reflexes abnormal.      Comments: No focal neurosensory deficits are noted.  Diabetic  peripheral  neuropathy and  weakness   of legs  .  Left foot drop.  Coordination and gait are unstable .   Using  a cane or a walker.  Patient is becoming more forgetful with age.   Psychiatric:         Mood and Affect: Mood normal.         Judgment: Judgment normal.      Comments: Patient has a lot of anxiety and OCD about his health issues.  He worries about everything that seems to bother him especially his arthritis.  He also is becoming more forgetful with age and his wife is concerned about vascular dementia.  Patient does still drive and stays somewhat active for his age.  Does rely on his wife when he becomes forgetful or cannot remember something.       PLAN : Patient is an 84-year-old diabetic male who was evaluated today for several problems and concerns and also follow-up on his blood sugar and A1c.  He does have a skin rash and did see dermatology and they want to start him on doxycycline 50 mg daily.  I told him he could try it and see if it helps but he has to stop picking at his skin and causing skin irritation.  Today's blood sugar was 178 and his A1c had improved to 7.9%, down from 8.6% last visit.  He also is concerned about his right knee problem and he is going to see orthopedics about gel shots.  His balance is not good and he has left foot drop which has worsened since his spinal fusion 4 years ago and he has to be cautious not to fall.  He must use a cane at all times to coordinate his balance.  Patient otherwise will follow-up as needed in 3 to 6 months.    Problem List Items Addressed This Visit    None           Luis Newman, DO

## 2025-03-20 RX ORDER — SITAGLIPTIN AND METFORMIN HYDROCHLORIDE 1000; 50 MG/1; MG/1
1 TABLET, FILM COATED ORAL 2 TIMES DAILY
Qty: 180 TABLET | Refills: 2 | Status: SHIPPED | OUTPATIENT
Start: 2025-03-20

## 2025-03-27 ENCOUNTER — ANTICOAGULATION - WARFARIN VISIT (OUTPATIENT)
Dept: PHARMACY | Facility: CLINIC | Age: 85
End: 2025-03-27
Payer: MEDICARE

## 2025-03-27 DIAGNOSIS — I48.91 ATRIAL FIBRILLATION, UNSPECIFIED TYPE (MULTI): Primary | ICD-10-CM

## 2025-03-27 LAB
POC INR: 2.7
POC PROTHROMBIN TIME: NORMAL

## 2025-03-27 PROCEDURE — 99212 OFFICE O/P EST SF 10 MIN: CPT

## 2025-03-27 PROCEDURE — 85610 PROTHROMBIN TIME: CPT | Mod: QW

## 2025-03-27 NOTE — PROGRESS NOTES
Coumadin Clinic Visit Note    Patient verified warfarin dose  No missed doses  Patient started doxycycline 50 mg once daily and also now on centrum adult MVI and iron supplements   Started doxycycline 1 week ago   No unusual bruising or bleeding  No changes to medications  Consistent dietary green intake  No anticipated procedures at this time  INR Therapeutic today at 2.7  No changes to warfarin dose today  Next appointment 3 weeks    Denisse Rucker, Pharm D

## 2025-04-16 ENCOUNTER — APPOINTMENT (OUTPATIENT)
Dept: PRIMARY CARE | Facility: CLINIC | Age: 85
End: 2025-04-16
Payer: MEDICARE

## 2025-04-16 VITALS
HEART RATE: 67 BPM | WEIGHT: 214 LBS | OXYGEN SATURATION: 96 % | SYSTOLIC BLOOD PRESSURE: 104 MMHG | HEIGHT: 67 IN | TEMPERATURE: 97.7 F | DIASTOLIC BLOOD PRESSURE: 52 MMHG | RESPIRATION RATE: 16 BRPM | BODY MASS INDEX: 33.59 KG/M2

## 2025-04-16 DIAGNOSIS — M47.22 OSTEOARTHRITIS OF SPINE WITH RADICULOPATHY, CERVICAL REGION: ICD-10-CM

## 2025-04-16 DIAGNOSIS — E66.09 CLASS 1 OBESITY DUE TO EXCESS CALORIES WITHOUT SERIOUS COMORBIDITY WITH BODY MASS INDEX (BMI) OF 33.0 TO 33.9 IN ADULT: ICD-10-CM

## 2025-04-16 DIAGNOSIS — R26.81 UNSTEADY GAIT WHEN WALKING: ICD-10-CM

## 2025-04-16 DIAGNOSIS — I95.2 HYPOTENSION DUE TO DRUGS: Primary | ICD-10-CM

## 2025-04-16 DIAGNOSIS — D69.6 THROMBOCYTOPENIA (CMS-HCC): ICD-10-CM

## 2025-04-16 DIAGNOSIS — E66.811 CLASS 1 OBESITY DUE TO EXCESS CALORIES WITHOUT SERIOUS COMORBIDITY WITH BODY MASS INDEX (BMI) OF 33.0 TO 33.9 IN ADULT: ICD-10-CM

## 2025-04-16 DIAGNOSIS — Z98.890 STATUS POST LUMBAR SPINE OPERATIVE PROCEDURE FOR DECOMPRESSION OF SPINAL CORD: ICD-10-CM

## 2025-04-16 DIAGNOSIS — M17.11 PRIMARY OSTEOARTHRITIS OF RIGHT KNEE: ICD-10-CM

## 2025-04-16 DIAGNOSIS — E11.9 DIABETES MELLITUS WITHOUT COMPLICATION: ICD-10-CM

## 2025-04-16 DIAGNOSIS — S16.1XXS STRAIN OF NECK MUSCLE, SEQUELA: ICD-10-CM

## 2025-04-16 DIAGNOSIS — M62.81 MUSCLE WEAKNESS (GENERALIZED): ICD-10-CM

## 2025-04-16 LAB
HBA1C MFR BLD: 8.8 % (ref 4.2–6.5)
POC FINGERSTICK BLOOD GLUCOSE: 161 MG/DL (ref 70–100)

## 2025-04-16 PROCEDURE — 1123F ACP DISCUSS/DSCN MKR DOCD: CPT | Performed by: FAMILY MEDICINE

## 2025-04-16 PROCEDURE — 3074F SYST BP LT 130 MM HG: CPT | Performed by: FAMILY MEDICINE

## 2025-04-16 PROCEDURE — 83036 HEMOGLOBIN GLYCOSYLATED A1C: CPT | Mod: CLIA WAIVED TEST | Performed by: FAMILY MEDICINE

## 2025-04-16 PROCEDURE — 1036F TOBACCO NON-USER: CPT | Performed by: FAMILY MEDICINE

## 2025-04-16 PROCEDURE — 1126F AMNT PAIN NOTED NONE PRSNT: CPT | Performed by: FAMILY MEDICINE

## 2025-04-16 PROCEDURE — 1159F MED LIST DOCD IN RCRD: CPT | Performed by: FAMILY MEDICINE

## 2025-04-16 PROCEDURE — 3078F DIAST BP <80 MM HG: CPT | Performed by: FAMILY MEDICINE

## 2025-04-16 PROCEDURE — 1160F RVW MEDS BY RX/DR IN RCRD: CPT | Performed by: FAMILY MEDICINE

## 2025-04-16 PROCEDURE — 82962 GLUCOSE BLOOD TEST: CPT | Performed by: FAMILY MEDICINE

## 2025-04-16 PROCEDURE — 99214 OFFICE O/P EST MOD 30 MIN: CPT | Performed by: FAMILY MEDICINE

## 2025-04-16 ASSESSMENT — PAIN SCALES - GENERAL: PAINLEVEL_OUTOF10: 0-NO PAIN

## 2025-04-16 NOTE — PROGRESS NOTES
Subjective   Trell Aguila is a 84 y.o. male who presents for Follow-up (Follow up visit, blood pressure check and medication review today).    HPI  : Patient is an 84-year-old diabetic male who has multiple health issues and concerns.  He does suffer with chronic anxiety and worries about his diabetic control and his chronic skin conditions.  Patient does follow with cardiology and also dermatology.  He wants to see orthopedics soon to have gel shots in his right knee.  He is postop lumbar laminectomy for years now but he still has his left foot drop and must use a cane for ambulation.  He is accompanied with his wife.  His blood pressure has been low recently and I have started decreasing his losartan due to low blood pressure.  He has an appointment with cardiology in June.  Patient also has a long history of asthmatic bronchitis and states that he has a cough with the weather change and I reminded him to use his home nebulizer machine.    Objective  : ROS : 10 systems were reviewed and the information is included in the HPI and no additional review of systems is indicated.    Physical Exam  Vitals and nursing note reviewed.   Constitutional:       Appearance: Normal appearance. He is obese.      Comments: Patient is alert and oriented x3.   No acute distress   HENT:      Head: Normocephalic.      Right Ear: Tympanic membrane and external ear normal.      Left Ear: Tympanic membrane and external ear normal.      Ears:      Comments: Ears are patent bilaterally and TMs are clear.     Nose: Nose normal.      Mouth/Throat:      Mouth: Mucous membranes are moist.      Pharynx: Oropharynx is clear.      Comments: Mouth is moist, tongue is midline.  No posterior pharyngeal erythema.  Eyes:      Extraocular Movements: Extraocular movements intact.      Conjunctiva/sclera: Conjunctivae normal.      Pupils: Pupils are equal, round, and reactive to light.      Comments: Follows  with eye doctor.  Diabetic check.   Neck:       Comments: No carotid bruits, no thyromegaly, no cervical adenopathy.  Occasional neck spasm and restriction of motion secondary to stress and tension.  Cardiovascular:      Rate and Rhythm: Normal rate and regular rhythm.      Pulses: Normal pulses.      Heart sounds: Normal heart sounds.      Comments: Patient denies chest pain and no palpitations.  Heart rhythm is stable S1 and S2 are noted, no ectopics.  Pulmonary:      Effort: Pulmonary effort is normal.      Breath sounds: Rhonchi present.      Comments: History of Asthmatic bronchitis.   Lungs  with few rhonchi  no wheezing.  Abdominal:      General: Bowel sounds are normal.      Palpations: Abdomen is soft.      Comments: Abdomen is soft and  mildly obese ,  but  non tender.   No flank tenderness.  No suprapubic pain.  Positive bowel sounds .  No abdominal guarding and no rebound tenderness.   Genitourinary:     Comments: Patient denies dysuria, no hematuria,  denies flank pain.  Nocturia .   Musculoskeletal:         General: Tenderness present. Normal range of motion.      Cervical back: Normal range of motion.      Comments: Right knee pain and will see Orthopedics  for gel shots.  Patient had lumbar spine surgery 4 years ago and does have a chronic left foot drop.  He must use a cane for balance and ambulation.  He has to be very cautious not to lose his balance.   Skin:     General: Skin is warm.      Findings: Bruising and rash present.      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  He also bruises easily on his arms due to picking his skin and also from Coumadin.   Neurological:      General: No focal deficit present.      Mental Status: He is alert and oriented to person, place, and time. Mental status is at baseline.      Sensory: Sensory deficit present.      Motor: Weakness present.      Coordination: Coordination abnormal.      Gait: Gait abnormal.      Comments: Left foot drop.    Patient has  diabetic    peripheral neuropathy.  Coordination and gait are unstable.  Decreased muscle strength upper and lower extremities.  Ambulates with a cane to steady his balance.    Psychiatric:         Behavior: Behavior normal.         Thought Content: Thought content normal.         Judgment: Judgment normal.      Comments: Patient has anxious  mood and affect.  Hypochondriasis concerning his health problems.  Thought content and judgment are stable.  Patient is becoming more forgetful with age.     PLAN : Patient is a elderly 84-year-old diabetic male who worries about his health problems and always has a new complaint with each visit.  He always wants to review his blood sugars on his home glucometer and they do fluctuate quite a bit.  His wife states that he likes his sweets and his pastries.  Today his sugar was 161 and his A1c was 8.8%.  He does need to watch his diet better but he does take his medications as ordered.  He occasionally does forget diabetic meds on occasion.  Also his blood pressure has been low and I recommended he take his losartan 50 mg every other day to see if it makes a difference with his hypertension.  He otherwise will follow-up as needed and he does continue to follow at the Coumadin clinic for his INR and he follows with dermatology and cardiology.  He will make an appointment with orthopedics for gel shots.    Problem List Items Addressed This Visit    None           Luis Newman,

## 2025-04-18 ENCOUNTER — APPOINTMENT (OUTPATIENT)
Dept: PHARMACY | Facility: CLINIC | Age: 85
End: 2025-04-18
Payer: MEDICARE

## 2025-04-22 ENCOUNTER — ANTICOAGULATION - WARFARIN VISIT (OUTPATIENT)
Dept: PHARMACY | Facility: CLINIC | Age: 85
End: 2025-04-22
Payer: MEDICARE

## 2025-04-22 DIAGNOSIS — I48.91 ATRIAL FIBRILLATION, UNSPECIFIED TYPE (MULTI): Primary | ICD-10-CM

## 2025-04-22 LAB
POC INR: 3.1 (ref 0.9–1.1)
POC PROTHROMBIN TIME: ABNORMAL (ref 9.3–12.5)

## 2025-04-22 PROCEDURE — 99212 OFFICE O/P EST SF 10 MIN: CPT

## 2025-04-22 PROCEDURE — 85610 PROTHROMBIN TIME: CPT | Mod: QW

## 2025-04-22 NOTE — PROGRESS NOTES
Coumadin Clinic Visit Note    Patient verified warfarin dose  No missed doses  No unusual bruising or bleeding   HE WAS  on azithromycin 500 mg daily for 3 days on 4/17 . 4/18 , 4/19 , he is done with antibiotic then will resume doxycycline 50 mg daily after , been on it for a month doxycycline did not change the inr much  , also losartan was decreased to every other day   Consistent dietary green intake  No anticipated procedures at this time  INR Supratherapeutic today at 3.1  No changes to warfarin dose today  Next appointment 4 weeks      Alesha Steen, PharmD

## 2025-05-20 ENCOUNTER — ANTICOAGULATION - WARFARIN VISIT (OUTPATIENT)
Dept: PHARMACY | Facility: CLINIC | Age: 85
End: 2025-05-20
Payer: MEDICARE

## 2025-05-20 DIAGNOSIS — I48.91 ATRIAL FIBRILLATION, UNSPECIFIED TYPE (MULTI): Primary | ICD-10-CM

## 2025-05-20 LAB
POC INR: 2.7 (ref 0.9–1.1)
POC PROTHROMBIN TIME: ABNORMAL (ref 9.3–12.5)

## 2025-05-20 PROCEDURE — 85610 PROTHROMBIN TIME: CPT | Mod: QW

## 2025-05-20 PROCEDURE — 99212 OFFICE O/P EST SF 10 MIN: CPT

## 2025-05-20 NOTE — PROGRESS NOTES
Coumadin Clinic Visit Note    Patient verified warfarin dose  No missed doses  No unusual bruising or bleeding  No changes to medications  Consistent dietary green intake  No anticipated procedures at this time  INR Therapeutic today at 2.7  No changes to warfarin dose today  Next appointment 5 weeks      Alesha Steen, PharmD

## 2025-05-21 ENCOUNTER — APPOINTMENT (OUTPATIENT)
Dept: PRIMARY CARE | Facility: CLINIC | Age: 85
End: 2025-05-21
Payer: MEDICARE

## 2025-05-21 VITALS
HEIGHT: 65 IN | OXYGEN SATURATION: 96 % | TEMPERATURE: 97.9 F | HEART RATE: 76 BPM | SYSTOLIC BLOOD PRESSURE: 110 MMHG | RESPIRATION RATE: 18 BRPM | BODY MASS INDEX: 36.15 KG/M2 | DIASTOLIC BLOOD PRESSURE: 60 MMHG | WEIGHT: 217 LBS

## 2025-05-21 DIAGNOSIS — M17.11 PRIMARY LOCALIZED OSTEOARTHRITIS OF RIGHT KNEE: ICD-10-CM

## 2025-05-21 DIAGNOSIS — E11.65 TYPE 2 DIABETES MELLITUS WITH HYPERGLYCEMIA, WITHOUT LONG-TERM CURRENT USE OF INSULIN: Primary | ICD-10-CM

## 2025-05-21 DIAGNOSIS — E11.42 TYPE 2 DIABETES MELLITUS WITH DIABETIC POLYNEUROPATHY, WITHOUT LONG-TERM CURRENT USE OF INSULIN: ICD-10-CM

## 2025-05-21 DIAGNOSIS — R26.81 UNSTEADY GAIT WHEN WALKING: ICD-10-CM

## 2025-05-21 DIAGNOSIS — E66.01 OBESITY, MORBID (MULTI): ICD-10-CM

## 2025-05-21 DIAGNOSIS — I35.0 MILD AORTIC STENOSIS: ICD-10-CM

## 2025-05-21 DIAGNOSIS — Z98.890 STATUS POST LUMBAR SPINE OPERATIVE PROCEDURE FOR DECOMPRESSION OF SPINAL CORD: ICD-10-CM

## 2025-05-21 DIAGNOSIS — I48.0 PAROXYSMAL ATRIAL FIBRILLATION (MULTI): ICD-10-CM

## 2025-05-21 DIAGNOSIS — E11.9 DIABETES MELLITUS WITHOUT COMPLICATION: ICD-10-CM

## 2025-05-21 PROBLEM — L97.901: Status: RESOLVED | Noted: 2024-06-25 | Resolved: 2025-05-21

## 2025-05-21 PROBLEM — L98.491: Status: RESOLVED | Noted: 2023-02-09 | Resolved: 2025-05-21

## 2025-05-21 LAB
HBA1C MFR BLD: 8 % (ref 4.2–6.5)
POC FINGERSTICK BLOOD GLUCOSE: 168 MG/DL (ref 70–100)

## 2025-05-21 PROCEDURE — 1126F AMNT PAIN NOTED NONE PRSNT: CPT | Performed by: FAMILY MEDICINE

## 2025-05-21 PROCEDURE — 1036F TOBACCO NON-USER: CPT | Performed by: FAMILY MEDICINE

## 2025-05-21 PROCEDURE — 99214 OFFICE O/P EST MOD 30 MIN: CPT | Performed by: FAMILY MEDICINE

## 2025-05-21 PROCEDURE — 3078F DIAST BP <80 MM HG: CPT | Performed by: FAMILY MEDICINE

## 2025-05-21 PROCEDURE — 3074F SYST BP LT 130 MM HG: CPT | Performed by: FAMILY MEDICINE

## 2025-05-21 PROCEDURE — 82962 GLUCOSE BLOOD TEST: CPT | Performed by: FAMILY MEDICINE

## 2025-05-21 PROCEDURE — 1160F RVW MEDS BY RX/DR IN RCRD: CPT | Performed by: FAMILY MEDICINE

## 2025-05-21 PROCEDURE — 1159F MED LIST DOCD IN RCRD: CPT | Performed by: FAMILY MEDICINE

## 2025-05-21 PROCEDURE — 83036 HEMOGLOBIN GLYCOSYLATED A1C: CPT | Mod: CLIA WAIVED TEST | Performed by: FAMILY MEDICINE

## 2025-05-21 RX ORDER — LANCETS
1 EACH MISCELLANEOUS 2 TIMES DAILY
Qty: 200 EACH | Refills: 3 | Status: SHIPPED | OUTPATIENT
Start: 2025-05-21

## 2025-05-21 ASSESSMENT — PAIN SCALES - GENERAL: PAINLEVEL_OUTOF10: 0-NO PAIN

## 2025-05-22 DIAGNOSIS — G62.9 NEUROPATHY: ICD-10-CM

## 2025-05-22 DIAGNOSIS — I10 PRIMARY HYPERTENSION: ICD-10-CM

## 2025-05-22 RX ORDER — METOPROLOL SUCCINATE 100 MG/1
100 TABLET, EXTENDED RELEASE ORAL NIGHTLY
Qty: 90 TABLET | Refills: 3 | Status: SHIPPED | OUTPATIENT
Start: 2025-05-22 | End: 2026-05-22

## 2025-05-22 RX ORDER — GABAPENTIN 600 MG/1
600 TABLET ORAL 3 TIMES DAILY
Qty: 270 TABLET | Refills: 3 | Status: SHIPPED | OUTPATIENT
Start: 2025-05-22 | End: 2026-05-22

## 2025-05-22 RX ORDER — METOPROLOL SUCCINATE 50 MG/1
50 TABLET, EXTENDED RELEASE ORAL
Qty: 90 TABLET | Refills: 3 | Status: SHIPPED | OUTPATIENT
Start: 2025-05-22 | End: 2026-05-22

## 2025-05-28 ENCOUNTER — TELEPHONE (OUTPATIENT)
Dept: PRIMARY CARE | Facility: CLINIC | Age: 85
End: 2025-05-28
Payer: MEDICARE

## 2025-05-28 DIAGNOSIS — E11.65 TYPE 2 DIABETES MELLITUS WITH HYPERGLYCEMIA, WITHOUT LONG-TERM CURRENT USE OF INSULIN: ICD-10-CM

## 2025-05-28 RX ORDER — LANCETS
1 EACH MISCELLANEOUS 2 TIMES DAILY
Qty: 200 EACH | Refills: 3 | Status: SHIPPED | OUTPATIENT
Start: 2025-05-28 | End: 2025-05-28 | Stop reason: SDUPTHER

## 2025-05-28 RX ORDER — LANCETS
1 EACH MISCELLANEOUS DAILY
Qty: 100 EACH | Refills: 3 | Status: SHIPPED | OUTPATIENT
Start: 2025-05-28

## 2025-05-28 NOTE — TELEPHONE ENCOUNTER
Pt wife called regarding needing prior authorization for Microlet lancet she said pharmacy sent something to be signed but they called her today and said they haven't received.  Thank you

## 2025-05-29 NOTE — TELEPHONE ENCOUNTER
Phoned patient, left v/m. Lancets were reordered yesterday. Patient is allowed to test blood sugar once a day by medicare guidelines

## 2025-06-05 PROBLEM — M25.432 WRIST SWELLING, LEFT: Status: RESOLVED | Noted: 2024-08-13 | Resolved: 2025-06-05

## 2025-06-05 PROBLEM — M25.512 ACUTE PAIN OF LEFT SHOULDER: Status: RESOLVED | Noted: 2023-02-09 | Resolved: 2025-06-05

## 2025-06-05 PROBLEM — Z98.818 OTHER DENTAL PROCEDURE STATUS: Status: RESOLVED | Noted: 2023-02-09 | Resolved: 2025-06-05

## 2025-06-05 PROBLEM — M99.01 CERVICAL SEGMENT DYSFUNCTION: Status: RESOLVED | Noted: 2023-02-08 | Resolved: 2025-06-05

## 2025-06-05 PROBLEM — S81.812A SKIN TEAR OF LEFT LOWER LEG WITHOUT COMPLICATION: Status: RESOLVED | Noted: 2024-03-04 | Resolved: 2025-06-05

## 2025-06-05 PROBLEM — E66.812 CLASS 2 OBESITY WITH BODY MASS INDEX (BMI) OF 36.0 TO 36.9 IN ADULT: Status: ACTIVE | Noted: 2021-04-02

## 2025-06-05 PROBLEM — J18.9 PNEUMONIA OF BOTH LUNGS DUE TO INFECTIOUS ORGANISM, UNSPECIFIED PART OF LUNG: Status: RESOLVED | Noted: 2024-12-24 | Resolved: 2025-06-05

## 2025-06-05 PROBLEM — L73.9 ACUTE FOLLICULITIS: Status: RESOLVED | Noted: 2023-02-08 | Resolved: 2025-06-05

## 2025-06-05 PROBLEM — R26.9 ABNORMAL GAIT: Status: RESOLVED | Noted: 2023-02-08 | Resolved: 2025-06-05

## 2025-06-05 PROBLEM — M25.539 WRIST PAIN: Status: RESOLVED | Noted: 2023-02-09 | Resolved: 2025-06-05

## 2025-06-05 PROBLEM — B07.9 VIRAL WART ON FINGER: Status: RESOLVED | Noted: 2023-12-18 | Resolved: 2025-06-05

## 2025-06-05 PROBLEM — M25.511 RIGHT SHOULDER PAIN: Status: RESOLVED | Noted: 2023-02-09 | Resolved: 2025-06-05

## 2025-06-05 PROBLEM — L72.3 SEBACEOUS CYST OF EAR: Status: RESOLVED | Noted: 2023-02-09 | Resolved: 2025-06-05

## 2025-06-05 PROBLEM — M70.62 TROCHANTERIC BURSITIS OF LEFT HIP: Status: RESOLVED | Noted: 2023-02-09 | Resolved: 2025-06-05

## 2025-06-05 PROBLEM — M54.17 LUMBOSACRAL RADICULITIS: Status: RESOLVED | Noted: 2023-02-09 | Resolved: 2025-06-05

## 2025-06-05 PROBLEM — Z00.00 MEDICARE ANNUAL WELLNESS VISIT, SUBSEQUENT: Status: RESOLVED | Noted: 2023-09-26 | Resolved: 2025-06-05

## 2025-06-05 PROBLEM — Z12.5 ENCOUNTER FOR SCREENING PROSTATE SPECIFIC ANTIGEN (PSA) MEASUREMENT: Status: RESOLVED | Noted: 2025-01-15 | Resolved: 2025-06-05

## 2025-06-05 PROBLEM — B37.9 YEAST INFECTION: Status: RESOLVED | Noted: 2023-12-18 | Resolved: 2025-06-05

## 2025-06-05 PROBLEM — M25.512 LEFT SHOULDER PAIN: Status: RESOLVED | Noted: 2023-02-09 | Resolved: 2025-06-05

## 2025-06-05 PROBLEM — R21 RASH OF GROIN: Status: RESOLVED | Noted: 2023-02-08 | Resolved: 2025-06-05

## 2025-06-05 PROBLEM — J01.00 ACUTE MAXILLARY SINUSITIS: Status: RESOLVED | Noted: 2023-02-08 | Resolved: 2025-06-05

## 2025-06-05 PROBLEM — J04.0 LARYNGITIS: Status: RESOLVED | Noted: 2023-02-09 | Resolved: 2025-06-05

## 2025-06-05 PROBLEM — Z23 NEEDS FLU SHOT: Status: RESOLVED | Noted: 2023-09-26 | Resolved: 2025-06-05

## 2025-06-05 PROBLEM — S50.312A ABRASION OF LEFT ELBOW: Status: RESOLVED | Noted: 2023-02-08 | Resolved: 2025-06-05
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
## 2025-06-05 PROBLEM — S49.90XA SHOULDER INJURY: Status: RESOLVED | Noted: 2023-12-18 | Resolved: 2025-06-05

## 2025-06-05 PROBLEM — F17.200 NICOTINE DEPENDENCE: Status: ACTIVE | Noted: 2021-04-02

## 2025-06-05 PROBLEM — H26.9 CATARACT, LEFT EYE: Status: RESOLVED | Noted: 2023-02-08 | Resolved: 2025-06-05

## 2025-06-05 PROBLEM — M25.432 SWELLING OF JOINT OF BOTH WRISTS: Status: RESOLVED | Noted: 2023-02-09 | Resolved: 2025-06-05

## 2025-06-05 PROBLEM — S40.012A CONTUSION OF LEFT SHOULDER: Status: RESOLVED | Noted: 2023-02-08 | Resolved: 2025-06-05

## 2025-06-05 PROBLEM — Z00.00 ROUTINE GENERAL MEDICAL EXAMINATION AT HEALTH CARE FACILITY: Status: RESOLVED | Noted: 2025-01-15 | Resolved: 2025-06-05

## 2025-06-05 PROBLEM — Z12.5 SCREENING PSA (PROSTATE SPECIFIC ANTIGEN): Status: RESOLVED | Noted: 2023-07-19 | Resolved: 2025-06-05

## 2025-06-05 PROBLEM — S23.9XXA SPRAIN OF THORACIC REGION: Status: RESOLVED | Noted: 2023-02-09 | Resolved: 2025-06-05

## 2025-06-05 PROBLEM — K62.5 RECTAL BLEEDING: Status: RESOLVED | Noted: 2023-02-09 | Resolved: 2025-06-05

## 2025-06-05 PROBLEM — R10.11 ABDOMINAL PAIN, RUQ (RIGHT UPPER QUADRANT): Status: RESOLVED | Noted: 2023-02-08 | Resolved: 2025-06-05

## 2025-06-05 PROBLEM — S63.509A SPRAIN OF WRIST: Status: RESOLVED | Noted: 2023-02-09 | Resolved: 2025-06-05

## 2025-06-05 PROBLEM — S39.012A LUMBAR STRAIN: Status: RESOLVED | Noted: 2023-02-09 | Resolved: 2025-06-05

## 2025-06-05 PROBLEM — M65.939 TENOSYNOVITIS OF WRIST: Status: RESOLVED | Noted: 2023-02-09 | Resolved: 2025-06-05

## 2025-06-05 PROBLEM — Z23 NEED FOR PNEUMOCOCCAL VACCINE: Status: RESOLVED | Noted: 2024-09-25 | Resolved: 2025-06-05

## 2025-06-05 PROBLEM — M25.469 EFFUSION OF KNEE JOINT: Status: RESOLVED | Noted: 2023-02-08 | Resolved: 2025-06-05

## 2025-06-05 PROBLEM — H61.20 IMPACTED CERUMEN: Status: RESOLVED | Noted: 2024-03-19 | Resolved: 2025-06-05
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
## 2025-06-05 PROBLEM — I25.10 ATHEROSCLEROSIS OF CORONARY ARTERY WITHOUT ANGINA PECTORIS: Status: ACTIVE | Noted: 2021-04-02

## 2025-06-05 PROBLEM — M54.12 BRACHIAL (CERVICAL) NEURITIS: Status: RESOLVED | Noted: 2023-07-19 | Resolved: 2025-06-05

## 2025-06-05 PROBLEM — M77.8 LEFT ELBOW TENDINITIS: Status: RESOLVED | Noted: 2023-02-09 | Resolved: 2025-06-05

## 2025-06-05 PROBLEM — R26.2 DIFFICULTY IN WALKING, NOT ELSEWHERE CLASSIFIED: Status: RESOLVED | Noted: 2021-04-02 | Resolved: 2025-06-05

## 2025-06-05 PROBLEM — K60.2 RECTAL FISSURE: Status: RESOLVED | Noted: 2023-02-09 | Resolved: 2025-06-05

## 2025-06-05 PROBLEM — M77.8 SHOULDER CAPSULITIS, RIGHT: Status: RESOLVED | Noted: 2023-02-09 | Resolved: 2025-06-05

## 2025-06-05 PROBLEM — U07.1 COVID-19 VIREMIA: Status: RESOLVED | Noted: 2023-12-18 | Resolved: 2025-06-05

## 2025-06-05 PROBLEM — B07.9 VERRUCOUS SKIN LESION: Status: RESOLVED | Noted: 2023-02-09 | Resolved: 2025-06-05

## 2025-06-05 PROBLEM — Z86.19: Status: RESOLVED | Noted: 2023-02-08 | Resolved: 2025-06-05

## 2025-06-05 PROBLEM — H61.23 BILATERAL IMPACTED CERUMEN: Status: RESOLVED | Noted: 2023-02-08 | Resolved: 2025-06-05

## 2025-06-05 PROBLEM — M25.431 SWELLING OF JOINT OF BOTH WRISTS: Status: RESOLVED | Noted: 2023-02-09 | Resolved: 2025-06-05

## 2025-06-05 PROBLEM — K13.79 DISORDER OF UVULA: Status: RESOLVED | Noted: 2024-03-19 | Resolved: 2025-06-05

## 2025-06-05 PROBLEM — M19.011 PRIMARY OSTEOARTHRITIS, RIGHT SHOULDER: Status: RESOLVED | Noted: 2021-04-02 | Resolved: 2025-06-05
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
## 2025-06-05 PROBLEM — S70.01XA CONTUSION OF RIGHT HIP: Status: RESOLVED | Noted: 2023-12-18 | Resolved: 2025-06-05

## 2025-06-05 PROBLEM — S50.12XD: Status: RESOLVED | Noted: 2023-12-18 | Resolved: 2025-06-05

## 2025-06-05 PROBLEM — R53.83 FATIGUE: Status: RESOLVED | Noted: 2023-02-08 | Resolved: 2025-06-05

## 2025-06-05 PROBLEM — S51.811D SKIN TEAR OF FOREARM WITHOUT COMPLICATION, RIGHT, SUBSEQUENT ENCOUNTER: Status: RESOLVED | Noted: 2024-03-04 | Resolved: 2025-06-05

## 2025-06-05 PROBLEM — Z98.890 STATUS POST LUMBAR SPINE OPERATIVE PROCEDURE FOR DECOMPRESSION OF SPINAL CORD: Status: RESOLVED | Noted: 2023-02-09 | Resolved: 2025-06-05

## 2025-06-05 PROBLEM — M77.8 THUMB TENDONITIS: Status: RESOLVED | Noted: 2023-02-09 | Resolved: 2025-06-05

## 2025-06-05 PROBLEM — S51.812A SKIN TEAR OF LEFT FOREARM WITHOUT COMPLICATION: Status: RESOLVED | Noted: 2023-02-09 | Resolved: 2025-06-05

## 2025-06-05 PROBLEM — R58 ECCHYMOSIS: Status: RESOLVED | Noted: 2024-03-19 | Resolved: 2025-06-05

## 2025-06-05 PROBLEM — S16.1XXA STRAIN OF NECK MUSCLE: Status: RESOLVED | Noted: 2023-02-09 | Resolved: 2025-06-05

## 2025-06-05 PROBLEM — S51.819A: Status: RESOLVED | Noted: 2023-12-18 | Resolved: 2025-06-05

## 2025-06-05 PROBLEM — J06.9 UPPER RESPIRATORY INFECTION WITH COUGH AND CONGESTION: Status: RESOLVED | Noted: 2023-02-09 | Resolved: 2025-06-05

## 2025-06-05 PROBLEM — L03.311 CELLULITIS OF RIGHT ABDOMINAL WALL: Status: RESOLVED | Noted: 2023-02-08 | Resolved: 2025-06-05

## 2025-06-05 PROBLEM — D23.9 DYSPLASTIC NEVUS: Status: RESOLVED | Noted: 2023-02-08 | Resolved: 2025-06-05

## 2025-06-05 PROBLEM — J18.0 BRONCHOPNEUMONIA: Status: RESOLVED | Noted: 2023-02-08 | Resolved: 2025-06-05

## 2025-06-05 PROBLEM — B35.6 TINEA CRURIS: Status: RESOLVED | Noted: 2023-02-08 | Resolved: 2025-06-05

## 2025-06-05 PROBLEM — S80.01XA TRAUMATIC ECCHYMOSIS OF RIGHT KNEE: Status: RESOLVED | Noted: 2024-11-20 | Resolved: 2025-06-05

## 2025-06-18 ENCOUNTER — APPOINTMENT (OUTPATIENT)
Dept: PRIMARY CARE | Facility: CLINIC | Age: 85
End: 2025-06-18
Payer: MEDICARE

## 2025-06-18 VITALS
RESPIRATION RATE: 20 BRPM | HEART RATE: 61 BPM | HEIGHT: 68 IN | BODY MASS INDEX: 32.89 KG/M2 | OXYGEN SATURATION: 91 % | TEMPERATURE: 97.6 F | WEIGHT: 217 LBS | SYSTOLIC BLOOD PRESSURE: 110 MMHG | DIASTOLIC BLOOD PRESSURE: 58 MMHG

## 2025-06-18 DIAGNOSIS — M17.11 PRIMARY LOCALIZED OSTEOARTHRITIS OF RIGHT KNEE: ICD-10-CM

## 2025-06-18 DIAGNOSIS — R53.83 MALAISE AND FATIGUE: ICD-10-CM

## 2025-06-18 DIAGNOSIS — E11.9 DIABETES MELLITUS WITHOUT COMPLICATION: ICD-10-CM

## 2025-06-18 DIAGNOSIS — I10 PRIMARY HYPERTENSION: ICD-10-CM

## 2025-06-18 DIAGNOSIS — J45.40 MODERATE PERSISTENT ASTHMATIC BRONCHITIS WITHOUT COMPLICATION (HHS-HCC): Primary | ICD-10-CM

## 2025-06-18 DIAGNOSIS — F01.B4 MODERATE VASCULAR DEMENTIA WITH ANXIETY: ICD-10-CM

## 2025-06-18 DIAGNOSIS — E78.5 DYSLIPIDEMIA: ICD-10-CM

## 2025-06-18 DIAGNOSIS — I25.118 CORONARY ARTERY DISEASE OF NATIVE ARTERY OF NATIVE HEART WITH STABLE ANGINA PECTORIS: ICD-10-CM

## 2025-06-18 DIAGNOSIS — R53.81 MALAISE AND FATIGUE: ICD-10-CM

## 2025-06-18 DIAGNOSIS — I48.21 PERMANENT ATRIAL FIBRILLATION (MULTI): ICD-10-CM

## 2025-06-18 DIAGNOSIS — Z79.899 HIGH RISK MEDICATION USE: ICD-10-CM

## 2025-06-18 DIAGNOSIS — R26.81 UNSTEADY GAIT WHEN WALKING: ICD-10-CM

## 2025-06-18 DIAGNOSIS — E78.2 MIXED HYPERLIPIDEMIA: ICD-10-CM

## 2025-06-18 PROBLEM — I50.22 CHF (CONGESTIVE HEART FAILURE), NYHA CLASS II, CHRONIC, SYSTOLIC: Status: RESOLVED | Noted: 2023-02-08 | Resolved: 2025-06-18

## 2025-06-18 LAB
HBA1C MFR BLD: 8.2 % (ref 4.2–6.5)
POC FINGERSTICK BLOOD GLUCOSE: 161 MG/DL (ref 70–100)

## 2025-06-18 PROCEDURE — 3078F DIAST BP <80 MM HG: CPT | Performed by: FAMILY MEDICINE

## 2025-06-18 PROCEDURE — 82962 GLUCOSE BLOOD TEST: CPT | Performed by: FAMILY MEDICINE

## 2025-06-18 PROCEDURE — 1159F MED LIST DOCD IN RCRD: CPT | Performed by: FAMILY MEDICINE

## 2025-06-18 PROCEDURE — 99214 OFFICE O/P EST MOD 30 MIN: CPT | Performed by: FAMILY MEDICINE

## 2025-06-18 PROCEDURE — 1036F TOBACCO NON-USER: CPT | Performed by: FAMILY MEDICINE

## 2025-06-18 PROCEDURE — 83036 HEMOGLOBIN GLYCOSYLATED A1C: CPT | Mod: CLIA WAIVED TEST | Performed by: FAMILY MEDICINE

## 2025-06-18 PROCEDURE — 1160F RVW MEDS BY RX/DR IN RCRD: CPT | Performed by: FAMILY MEDICINE

## 2025-06-18 PROCEDURE — 1126F AMNT PAIN NOTED NONE PRSNT: CPT | Performed by: FAMILY MEDICINE

## 2025-06-18 PROCEDURE — 3074F SYST BP LT 130 MM HG: CPT | Performed by: FAMILY MEDICINE

## 2025-06-18 RX ORDER — PREDNISONE 10 MG/1
10 TABLET ORAL 2 TIMES DAILY
Qty: 10 TABLET | Refills: 0 | Status: SHIPPED | OUTPATIENT
Start: 2025-06-18 | End: 2025-06-23

## 2025-06-18 ASSESSMENT — PATIENT HEALTH QUESTIONNAIRE - PHQ9
2. FEELING DOWN, DEPRESSED OR HOPELESS: NOT AT ALL
1. LITTLE INTEREST OR PLEASURE IN DOING THINGS: NOT AT ALL
SUM OF ALL RESPONSES TO PHQ9 QUESTIONS 1 AND 2: 0
SUM OF ALL RESPONSES TO PHQ9 QUESTIONS 1 AND 2: 0
1. LITTLE INTEREST OR PLEASURE IN DOING THINGS: NOT AT ALL
2. FEELING DOWN, DEPRESSED OR HOPELESS: NOT AT ALL

## 2025-06-18 ASSESSMENT — PAIN SCALES - GENERAL: PAINLEVEL_OUTOF10: 0-NO PAIN

## 2025-06-18 NOTE — PROGRESS NOTES
Subjective   Trell Aguila is a 84 y.o. male who presents for Follow-up (Follow up visit for diabetes/).    HPI  : Patient is a 84-year-old diabetic male who is in today for a follow-up visit, review of medication and also recheck on his blood sugar and A1c, patient recently saw orthopedics and had some gel shots in his right knee and he states it is improving.  He also is taking Prevagen from over-the-counter to help his memory and forgetfulness.  Patient has a follow-up visit with cardiology next week.    Objective  : ROS :10 systems were reviewed and the information is included in the HPI and no additional review of systems is indicated.    Physical Exam  Vitals and nursing note reviewed.   Constitutional:       Appearance: Normal appearance. He is obese.      Comments: Patient is alert and oriented x3.   No acute distress   HENT:      Head: Normocephalic.      Right Ear: Tympanic membrane and external ear normal.      Left Ear: Tympanic membrane and external ear normal.      Ears:      Comments: Ears are patent bilaterally and TMs are clear.     Nose: Rhinorrhea present.      Mouth/Throat:      Mouth: Mucous membranes are moist.      Pharynx: Oropharynx is clear.      Comments: Mouth is moist, tongue is midline.  No posterior pharyngeal erythema.  Eyes:      Extraocular Movements: Extraocular movements intact.      Conjunctiva/sclera: Conjunctivae normal.      Pupils: Pupils are equal, round, and reactive to light.      Comments: No visual disturbance, does have his  eyes examined once a year.   Neck:      Comments: Cervical restriction of motion due to  degenerative disc disease.  Cardiovascular:      Rate and Rhythm: Normal rate. Rhythm irregular.      Pulses: Normal pulses.      Heart sounds: Normal heart sounds.      Comments: History of previous stent placement and also atrial fibrillation.  Patient is on Coumadin and goes to the Coumadin clinic.  He is monitored closely by cardiology.  Patient denies chest  pain . Heart rhythm is irregular, S1 and S2 are noted.  Pulmonary:      Effort: Pulmonary effort is normal.      Breath sounds: Wheezing and rhonchi present.      Comments: Coughing and wheezing due to the weather change and his asthma.  Lungs  with bilateral Wheezing.  I did encourage him to use his home nebulizer machine.  Abdominal:      General: Bowel sounds are normal.      Palpations: Abdomen is soft.      Comments: Abdomen is soft and obese and non tender.  No flank tenderness.  No suprapubic pain.  Positive bowel sounds.  No abdominal guarding and no rebound tenderness.   Genitourinary:     Comments: Patient denies dysuria, no hematuria, nocturia, denies flank pain.  Musculoskeletal:         General: Tenderness present. Normal range of motion.      Cervical back: Normal range of motion. Tenderness present.      Right lower leg: Edema present.      Left lower leg: Edema present.      Comments: Had spinal fusion  4 years ago.  Recently saw orthopedics and had gel shots in  his right knee.  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  Ambulates with an unsteady gait and must use a cane due to bilateral foot drop.  He does have poor coordination.  No ankle edema.     Skin:     General: Skin is warm and dry.      Findings: Rash present.      Comments: Chronic skin condition and follows with Dermatology.  Had been taking Vibramycin for several weeks but it has not improved the skin condition.   Neurological:      General: No focal deficit present.      Mental Status: He is alert and oriented to person, place, and time. Mental status is at baseline.      Sensory: Sensory deficit present.      Motor: Weakness present.      Coordination: Coordination abnormal.      Gait: Gait abnormal.      Deep Tendon Reflexes: Reflexes abnormal.      Comments: Unstable gait and poor coordination  and uses  a  cane.   Bilateral foot drop from his previous lumbosacral degenerative disc  disease and sciatica.  He also gets paresthesias in lower extremities and has diabetic peripheral neuropathy.  Has to be very cautious not to lose his balance and fall.   Psychiatric:         Mood and Affect: Mood normal.         Judgment: Judgment normal.      Comments: Patient has anxiety and OCD behavior.  He has become more forgetful and does worry about every little health issue and aching pain that he has.  His wife gets very frustrated since she has to wait on him constantly..  Memory is decreasing with age.  He does have certain issues remembering certain things.  This also frustrates his wife.  He is not very active at home.  He is taking over-the-counter Prevagen to see if it helps his memory.       PLAN : Patient is an 84-year-old diabetic male who was reevaluated today for follow-up visit, recheck on blood pressure, review of medication and also check on his blood sugar and A1c.  His blood sugar today was 161 and his A1c was 8.2%.  He states that he tries to watch his diet but according to his wife he is always looking to snack or eat something late at night.  I told him to try to cut the nighttime snacks out and that would help his diabetes control.  He also had some asthmatic bronchitis issues over the past few days and this is related to the weather change and also has chronic asthma.  He does have a home nebulizer machine that he can use and I did prescribe him a short course of prednisone.  He otherwise is stable and will follow-up in 2 to 3 months.    Problem List Items Addressed This Visit       Hyperlipidemia    Relevant Orders    POCT fingerstick glucose manually resulted    POCT Glycosylated Hemoglobin (HGB A1C) docked device    CBC    Comprehensive Metabolic Panel    Lipid Panel    Thyroid Stimulating Hormone    Malaise and fatigue    Relevant Orders    POCT fingerstick glucose manually resulted    POCT Glycosylated Hemoglobin (HGB A1C) docked device    CBC    Comprehensive Metabolic Panel     Lipid Panel    Thyroid Stimulating Hormone    Dyslipidemia    Relevant Orders    POCT fingerstick glucose manually resulted    POCT Glycosylated Hemoglobin (HGB A1C) docked device    CBC    Comprehensive Metabolic Panel    Lipid Panel    Thyroid Stimulating Hormone     Other Visit Diagnoses         Diabetes mellitus without complication        Relevant Orders    POCT fingerstick glucose manually resulted    POCT Glycosylated Hemoglobin (HGB A1C) docked device    CBC    Comprehensive Metabolic Panel    Lipid Panel    Thyroid Stimulating Hormone                 Luis Newman, DO

## 2025-06-19 LAB
ALBUMIN SERPL-MCNC: 4.4 G/DL (ref 3.6–5.1)
ALP SERPL-CCNC: 60 U/L (ref 35–144)
ALT SERPL-CCNC: 19 U/L (ref 9–46)
ANION GAP SERPL CALCULATED.4IONS-SCNC: 10 MMOL/L (CALC) (ref 7–17)
AST SERPL-CCNC: 23 U/L (ref 10–35)
BILIRUB SERPL-MCNC: 0.8 MG/DL (ref 0.2–1.2)
BUN SERPL-MCNC: 19 MG/DL (ref 7–25)
CALCIUM SERPL-MCNC: 9.3 MG/DL (ref 8.6–10.3)
CHLORIDE SERPL-SCNC: 102 MMOL/L (ref 98–110)
CHOLEST SERPL-MCNC: 108 MG/DL
CHOLEST/HDLC SERPL: 3.5 (CALC)
CO2 SERPL-SCNC: 28 MMOL/L (ref 20–32)
CREAT SERPL-MCNC: 1.37 MG/DL (ref 0.7–1.22)
EGFRCR SERPLBLD CKD-EPI 2021: 51 ML/MIN/1.73M2
ERYTHROCYTE [DISTWIDTH] IN BLOOD BY AUTOMATED COUNT: 16 % (ref 11–15)
GLUCOSE SERPL-MCNC: 152 MG/DL (ref 65–99)
HCT VFR BLD AUTO: 43.5 % (ref 38.5–50)
HDLC SERPL-MCNC: 31 MG/DL
HGB BLD-MCNC: 13 G/DL (ref 13.2–17.1)
LDLC SERPL CALC-MCNC: 50 MG/DL (CALC)
MCH RBC QN AUTO: 26.4 PG (ref 27–33)
MCHC RBC AUTO-ENTMCNC: 29.9 G/DL (ref 32–36)
MCV RBC AUTO: 88.2 FL (ref 80–100)
NONHDLC SERPL-MCNC: 77 MG/DL (CALC)
PLATELET # BLD AUTO: 161 THOUSAND/UL (ref 140–400)
PMV BLD REES-ECKER: 11.2 FL (ref 7.5–12.5)
POTASSIUM SERPL-SCNC: 4.6 MMOL/L (ref 3.5–5.3)
PROT SERPL-MCNC: 6.8 G/DL (ref 6.1–8.1)
RBC # BLD AUTO: 4.93 MILLION/UL (ref 4.2–5.8)
SODIUM SERPL-SCNC: 140 MMOL/L (ref 135–146)
TRIGL SERPL-MCNC: 205 MG/DL
TSH SERPL-ACNC: 2.77 MIU/L (ref 0.4–4.5)
WBC # BLD AUTO: 7.3 THOUSAND/UL (ref 3.8–10.8)

## 2025-06-20 NOTE — RESULT ENCOUNTER NOTE
White blood cell count is normal           red blood cell count or hemoglobin was 13.0     which is slightly anemic and he should be above 13.2   that is not bad.  He can take a vitamin with iron like we discussed in the office.       Platelet count was normal        kidney function is slightly borderline     and we will monitor that.   That is related to the diabetes.       Liver function is normal           cholesterol is very good at 108           triglycerides are 205 and should be under 150       that is from too many evening snacks.       A1c is 8.2 and we want to try to get it under 7.0        diet diet diet.  Thyroid function is stable            just continue to watch the diet.  And control the sugar.

## 2025-06-23 PROBLEM — E11.9 DIABETES MELLITUS WITHOUT COMPLICATION: Status: RESOLVED | Noted: 2025-06-18 | Resolved: 2025-06-23

## 2025-06-23 PROBLEM — E66.811 CLASS 1 OBESITY WITH BODY MASS INDEX (BMI) OF 32.0 TO 32.9 IN ADULT: Status: ACTIVE | Noted: 2021-04-02

## 2025-06-24 ENCOUNTER — ANTICOAGULATION - WARFARIN VISIT (OUTPATIENT)
Dept: PHARMACY | Facility: CLINIC | Age: 85
End: 2025-06-24
Payer: MEDICARE

## 2025-06-24 DIAGNOSIS — I48.91 ATRIAL FIBRILLATION, UNSPECIFIED TYPE (MULTI): Primary | ICD-10-CM

## 2025-06-24 DIAGNOSIS — E11.9 DIABETES MELLITUS WITHOUT COMPLICATION: ICD-10-CM

## 2025-06-24 LAB
POC INR: 3.2 (ref 0.9–1.1)
POC PROTHROMBIN TIME: ABNORMAL (ref 9.3–12.5)

## 2025-06-24 PROCEDURE — 85610 PROTHROMBIN TIME: CPT | Mod: QW

## 2025-06-24 PROCEDURE — 99212 OFFICE O/P EST SF 10 MIN: CPT

## 2025-06-24 RX ORDER — WARFARIN SODIUM 5 MG/1
TABLET ORAL
Qty: 70 TABLET | Refills: 1 | Status: SHIPPED | OUTPATIENT
Start: 2025-06-24 | End: 2026-06-24

## 2025-06-24 NOTE — PROGRESS NOTES
No bleeding or unusual bruising.  Medications and doses verified - started Breztri inhaler  Last week pt was on Prednisone 10mg daily for 5 days.  Pt also states he has not taken Amlodipine for a very long time - took it off the med list.  No scheduled procedures at this time.  INR=3.2   Plan: Continue same weekly dose.  Follow up INR check in 4 weeks.

## 2025-06-30 ENCOUNTER — APPOINTMENT (OUTPATIENT)
Dept: CARDIOLOGY | Facility: CLINIC | Age: 85
End: 2025-06-30
Payer: MEDICARE

## 2025-06-30 VITALS
BODY MASS INDEX: 33.19 KG/M2 | DIASTOLIC BLOOD PRESSURE: 70 MMHG | HEART RATE: 76 BPM | HEIGHT: 68 IN | OXYGEN SATURATION: 97 % | WEIGHT: 219 LBS | SYSTOLIC BLOOD PRESSURE: 114 MMHG

## 2025-06-30 DIAGNOSIS — I34.0 MODERATE MITRAL REGURGITATION: ICD-10-CM

## 2025-06-30 DIAGNOSIS — I25.10 CORONARY ARTERY DISEASE INVOLVING NATIVE CORONARY ARTERY OF NATIVE HEART WITHOUT ANGINA PECTORIS: ICD-10-CM

## 2025-06-30 DIAGNOSIS — I10 PRIMARY HYPERTENSION: ICD-10-CM

## 2025-06-30 DIAGNOSIS — I48.21 PERMANENT ATRIAL FIBRILLATION (MULTI): Primary | ICD-10-CM

## 2025-06-30 DIAGNOSIS — E78.49 OTHER HYPERLIPIDEMIA: ICD-10-CM

## 2025-06-30 DIAGNOSIS — Z95.5 HISTORY OF CORONARY ARTERY STENT PLACEMENT: ICD-10-CM

## 2025-06-30 DIAGNOSIS — I35.0 MILD AORTIC STENOSIS: ICD-10-CM

## 2025-06-30 PROCEDURE — 3074F SYST BP LT 130 MM HG: CPT | Performed by: INTERNAL MEDICINE

## 2025-06-30 PROCEDURE — 1036F TOBACCO NON-USER: CPT | Performed by: INTERNAL MEDICINE

## 2025-06-30 PROCEDURE — 1159F MED LIST DOCD IN RCRD: CPT | Performed by: INTERNAL MEDICINE

## 2025-06-30 PROCEDURE — 3078F DIAST BP <80 MM HG: CPT | Performed by: INTERNAL MEDICINE

## 2025-06-30 PROCEDURE — 99213 OFFICE O/P EST LOW 20 MIN: CPT | Performed by: INTERNAL MEDICINE

## 2025-06-30 PROCEDURE — 99212 OFFICE O/P EST SF 10 MIN: CPT

## 2025-06-30 RX ORDER — LOSARTAN POTASSIUM 25 MG/1
25 TABLET ORAL DAILY
Qty: 90 TABLET | Refills: 3 | Status: SHIPPED | OUTPATIENT
Start: 2025-06-30 | End: 2026-06-30

## 2025-06-30 NOTE — PROGRESS NOTES
Subjective   Trell Aguila is a 84 y.o. male.    Chief Complaint:  Follow-up coronary artery disease, congestive heart failure.    HPI    From a cardiac standpoint he has been doing well.  He denies any anginal symptoms.  No chest pressure or chest heaviness.  No increased shortness of breath from his baseline although activity levels are quite limited because of his orthopedic issues.  He feels that his back is better.  Does have joint pains and particularly knee pains which limit his activities.  No orthopnea or paroxysmal nocturnal dyspnea.  No hospitalizations or emergency room visits since his last visit.    An echocardiographic study in December 2021 demonstrated normal left ventricular systolic function. There was moderate mitral regurgitation present.     In April 2021 he underwent lumbar spine surgery He had lumbar disc 1-5 repaired. He tolerated the procedure well and did not have any significant cardiac complications. He was able to discontinue anticoagulation and then restarted post procedure.       Cardiac catheterization in 2003 demonstrated to 25% left main cornea artery 25% left anterior descending coronary artery total occlusion of the circumflex coronary artery and a 99% right coronary artery stenosis treated with balloon angioplasty and stent placement     Other cardiac problems including history of hypertension hyperlipidemia. He has chronic atrial fibrillation.      Allergies  Medication    · Clindamycin HCl CAPS   · Codeine Derivatives   · Penicillins   · Sulfa Drugs      Family History  Mother    · Family history of diabetes mellitus (V18.0) (Z83.3)  Father    · Family history of asthma (V17.5) (Z82.5)     Social History  Problems    · Former smoker (V15.82) (Z87.891)   · quit in dec 8 1997   · No alcohol use     Review of Systems   Constitutional: Positive for malaise/fatigue.   Cardiovascular:  Positive for dyspnea on exertion.   Hematologic/Lymphatic: Bruises/bleeds easily.  "  Musculoskeletal:  Positive for arthritis and joint pain.       Current Medications[1]     Visit Vitals  /70 (BP Location: Right arm)   Pulse 76   Ht 1.727 m (5' 8\")   Wt 99.3 kg (219 lb)   SpO2 97%   BMI 33.30 kg/m²   Smoking Status Former   BSA 2.18 m²        Objective     Constitutional:       Appearance: Not in distress.   Neck:      Vascular: JVD normal.   Pulmonary:      Breath sounds: Normal breath sounds.   Cardiovascular:      Normal rate. Regular rhythm. S1 with normal intensity. S2 with normal intensity.       Murmurs: There is a grade 1/6 systolic murmur.      No gallop.    Pulses:     Intact distal pulses.   Edema:     Peripheral edema absent.   Abdominal:      General: Bowel sounds are normal.   Neurological:      Mental Status: Alert and oriented to person, place and time.         Lab Review:   Lab Results   Component Value Date     06/18/2025    K 4.6 06/18/2025     06/18/2025    CO2 28 06/18/2025    BUN 19 06/18/2025    CREATININE 1.37 (H) 06/18/2025    GLUCOSE 152 (H) 06/18/2025    CALCIUM 9.3 06/18/2025     Lab Results   Component Value Date    WBC 7.3 06/18/2025    HGB 13.0 (L) 06/18/2025    HCT 43.5 06/18/2025    MCV 88.2 06/18/2025     06/18/2025     Lab Results   Component Value Date    CHOL 108 06/18/2025    TRIG 205 (H) 06/18/2025    HDL 31 (L) 06/18/2025       Assessment:    1.  Coronary artery disease.  Latest intervention was in 2003.  Has not had requiring any intervention since that time.  Has had no anginal symptoms.  Will continue medical management.    2.  Systolic congestive heart failure.  Ejection fraction 40% to 45%.  Will get echo study on his next visit.  Continue current medical management.  Has not had hospitalizations or exacerbations of his heart failure.    3.  Hyperlipidemia.  Cholesterol 108, HDL 31, LDL 50.    4.  Hypertension.  Actually blood pressures are quite low.  We agree with Dr. Hope to lower losartan dose.         [1]   Current " Outpatient Medications   Medication Sig Dispense Refill    cetirizine (ZyrTEC) 10 mg tablet Take 1 tablet (10 mg) by mouth once daily at bedtime.      cholecalciferol (Vitamin D-3) 50 MCG (2000 UT) tablet Take 1 tablet (50 mcg) by mouth once daily.      clobetasol (Temovate) 0.05 % cream Apply 1 Application topically if needed.      Contour Next Test Strips strip USE TO TEST TWICE DAILY 200 strip 3    ferrous sulfate 325 (65 Fe) MG EC tablet Take 1 tablet by mouth once daily with breakfast. Do not crush, chew, or split.      furosemide (Lasix) 40 mg tablet TAKE 1 TABLET BY MOUTH TWICE DAILY 180 tablet 3    gabapentin (Neurontin) 600 mg tablet Take 1 tablet (600 mg) by mouth 3 times a day. 270 tablet 3    insulin glargine (Toujeo Max U-300 SoloStar) 300 unit/mL (3 mL) injection Inject 15 Units under the skin once daily at bedtime. Take as directed per insulin instructions. 3 mL 1    lancets (Microlet Lancet) misc Inject 1 Lancet under the skin early in the morning.. 1 lancet daily for diabetes. 100 each 3    metoprolol succinate XL (Toprol-XL) 100 mg 24 hr tablet Take 1 tablet (100 mg) by mouth once daily at bedtime. Do not crush or chew. 90 tablet 3    metoprolol succinate XL (Toprol-XL) 50 mg 24 hr tablet Take 1 tablet (50 mg) by mouth once daily in the morning. Take before meals. Do not crush or chew. 90 tablet 3    multivitamin with minerals tablet Take 1 tablet by mouth once daily.      nitroglycerin (Nitrostat) 0.4 mg SL tablet Place 1 tablet (0.4 mg) under the tongue every 5 minutes if needed for chest pain.      nystatin (Mycostatin) 100,000 unit/gram powder Apply 1 Application topically 2 times a day. 60 g 3    pramoxine HCl (CERAVE ITCH RELIEF TOP) Apply 1 Application topically if needed (itching).      rosuvastatin (Crestor) 10 mg tablet Take 1 tablet (10 mg) by mouth once daily. 90 tablet 3    SITagliptin phos-metformin (Janumet) 50-1,000 mg tablet Take 1 tablet by mouth 2 times a day. 180 tablet 2     tamsulosin (Flomax) 0.4 mg 24 hr capsule Take 1 capsule (0.4 mg) by mouth once daily at bedtime. 90 capsule 3    warfarin (Coumadin) 5 mg tablet Take 5 mg (1 tablet) every Sunday and Thursday and 2.5 mg (1/2 tablet) all other days or as directed by the anticoagulation clinic. 70 tablet 1    albuterol 2.5 mg /3 mL (0.083 %) nebulizer solution Take 3 mL (2.5 mg) by nebulization every 8 hours if needed for wheezing. 90 mL 1    losartan (Cozaar) 25 mg tablet Take 1 tablet (25 mg) by mouth once daily. 90 tablet 3     No current facility-administered medications for this visit.

## 2025-07-16 ENCOUNTER — APPOINTMENT (OUTPATIENT)
Dept: PRIMARY CARE | Facility: CLINIC | Age: 85
End: 2025-07-16
Payer: MEDICARE

## 2025-07-16 VITALS
SYSTOLIC BLOOD PRESSURE: 122 MMHG | WEIGHT: 217 LBS | OXYGEN SATURATION: 95 % | HEART RATE: 68 BPM | HEIGHT: 68 IN | DIASTOLIC BLOOD PRESSURE: 68 MMHG | BODY MASS INDEX: 32.89 KG/M2

## 2025-07-16 DIAGNOSIS — F01.B4 MODERATE VASCULAR DEMENTIA WITH ANXIETY: ICD-10-CM

## 2025-07-16 DIAGNOSIS — B37.9 INFECTION DUE TO YEAST: ICD-10-CM

## 2025-07-16 DIAGNOSIS — M21.372 LEFT FOOT DROP: ICD-10-CM

## 2025-07-16 DIAGNOSIS — Z79.899 HIGH RISK MEDICATION USE: ICD-10-CM

## 2025-07-16 DIAGNOSIS — Z95.5 HISTORY OF CORONARY ARTERY STENT PLACEMENT: ICD-10-CM

## 2025-07-16 DIAGNOSIS — I35.0 MILD AORTIC STENOSIS: ICD-10-CM

## 2025-07-16 DIAGNOSIS — I11.0 HYPERTENSIVE HEART DISEASE WITH HEART FAILURE: ICD-10-CM

## 2025-07-16 DIAGNOSIS — B37.9 YEAST INFECTION: ICD-10-CM

## 2025-07-16 DIAGNOSIS — M17.11 PRIMARY LOCALIZED OSTEOARTHRITIS OF RIGHT KNEE: ICD-10-CM

## 2025-07-16 DIAGNOSIS — M21.371 FOOT DROP, RIGHT: ICD-10-CM

## 2025-07-16 DIAGNOSIS — I48.21 PERMANENT ATRIAL FIBRILLATION (MULTI): Primary | ICD-10-CM

## 2025-07-16 DIAGNOSIS — I50.32 CHRONIC DIASTOLIC CONGESTIVE HEART FAILURE: ICD-10-CM

## 2025-07-16 DIAGNOSIS — E11.69 TYPE 2 DIABETES MELLITUS WITH OTHER SPECIFIED COMPLICATION, WITHOUT LONG-TERM CURRENT USE OF INSULIN: Primary | ICD-10-CM

## 2025-07-16 LAB
HBA1C MFR BLD: 8.3 % (ref 4.2–6.5)
POC FINGERSTICK BLOOD GLUCOSE: 179 MG/DL (ref 70–100)

## 2025-07-16 PROCEDURE — 3078F DIAST BP <80 MM HG: CPT | Performed by: FAMILY MEDICINE

## 2025-07-16 PROCEDURE — 82962 GLUCOSE BLOOD TEST: CPT | Performed by: FAMILY MEDICINE

## 2025-07-16 PROCEDURE — 1160F RVW MEDS BY RX/DR IN RCRD: CPT | Performed by: FAMILY MEDICINE

## 2025-07-16 PROCEDURE — 3074F SYST BP LT 130 MM HG: CPT | Performed by: FAMILY MEDICINE

## 2025-07-16 PROCEDURE — 99214 OFFICE O/P EST MOD 30 MIN: CPT | Performed by: FAMILY MEDICINE

## 2025-07-16 PROCEDURE — 83036 HEMOGLOBIN GLYCOSYLATED A1C: CPT | Mod: CLIA WAIVED TEST | Performed by: FAMILY MEDICINE

## 2025-07-16 PROCEDURE — 1159F MED LIST DOCD IN RCRD: CPT | Performed by: FAMILY MEDICINE

## 2025-07-16 ASSESSMENT — ENCOUNTER SYMPTOMS: DEPRESSION: 0

## 2025-07-16 NOTE — PROGRESS NOTES
Subjective   Trell Aguila is a 84 y.o. male who presents for Follow-up (Following up for sugar and A1c/).    HPI  : Patient is an 84-year-old diabetic male who is in for follow-up visit, recheck on blood pressure, recheck on blood sugar and A1c, and review of medication.  He also has a groin rash that he is concerned about and he may need some antifungal powder.  He is accompanied with his wife and tends to be worried about multiple health problems.  He had lumbar spine surgery about 5 years ago and is stable after a fusion of his lumbar vertebrae.  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  He does follow with cardiology for previous stents.      Objective   : ROS : 10 systems were reviewed and the information is included in the HPI and no additional review of systems is indicated.    Physical Exam  Vitals and nursing note reviewed.   Constitutional:       Appearance: Normal appearance. He is obese.      Comments: Patient is alert and oriented x3.   No acute distress   HENT:      Head: Normocephalic.      Right Ear: Tympanic membrane and external ear normal.      Left Ear: Tympanic membrane and external ear normal.      Ears:      Comments: Ears are patent bilaterally and TMs are clear.     Nose: Nose normal.      Mouth/Throat:      Mouth: Mucous membranes are moist.      Pharynx: Oropharynx is clear.      Comments: Mouth is moist, tongue is midline.  No posterior pharyngeal erythema.    Eyes:      Extraocular Movements: Extraocular movements intact.      Conjunctiva/sclera: Conjunctivae normal.      Pupils: Pupils are equal, round, and reactive to light.      Comments: Denies  visual problems and does  follow with ophthalmology.   Neck:      Comments: No carotid bruits, no thyromegaly, no cervical adenopathy.  Occasional neck spasm and restriction of motion secondary to stress and tension.  Cardiovascular:      Rate and Rhythm: Normal rate and regular rhythm.      Pulses: Normal pulses.       Heart sounds: Normal heart sounds.      Comments: Follows  with  Cardiology   and does  have  stents.   Denies  chest pain.  Heart rhythm  is irregular  with Atrial fibrillation  S1 S2 noted.  Patient also follows at the Coumadin clinic.   Pulmonary:      Effort: Pulmonary effort is normal.      Breath sounds: Rhonchi present.      Comments: Long  history of Asthmatic  bronchitis.  Lungs  better after  Prednisone. Few  scattered  rhonchi.   Abdominal:      General: Bowel sounds are normal.      Palpations: Abdomen is soft.      Comments: Abdomen is soft and obese  and non tender.  No flank tenderness.  No suprapubic pain.    No abdominal guarding and no rebound tenderness.   Genitourinary:     Comments: Patient denies dysuria, no hematuria, denies flank pain.  Nocturia.     Musculoskeletal:         General: Tenderness present. Normal range of motion.      Cervical back: Normal range of motion.      Comments: Had  gel injection right  knee and stable.   Post  lumbar  fusion and stable . Using  cane  for  balance.     Skin:     General: Skin is warm.      Comments: Follows  with dermatology.   Groin irritation and yeast rash.     Neurological:      General: No focal deficit present.      Mental Status: He is alert and oriented to person, place, and time. Mental status is at baseline.      Sensory: Sensory deficit present.      Motor: Weakness present.      Coordination: Coordination abnormal.      Gait: Gait abnormal.      Comments: History of bilateral foot drop but does ambulate with a cane to maintain his coordination and balance.  He does have some diabetic peripheral neuropathy and also history of lumbosacral radiculitis that has improved since his back operation 5 years ago.  Age-related, decrease in muscle strength upper and lower extremities.   Psychiatric:         Behavior: Behavior normal.         Thought Content: Thought content normal.         Judgment: Judgment normal.      Comments: Patient has anxious  mood and affect.  Suffers from anxiety and hypochondriasis about his health problems.  Thought content and judgment are stable.  Age-related forgetfulness.  Behavior is normal.     PLAN : Patient is a 84-year-old diabetic male who was evaluated today for a follow-up visit, recheck on blood sugar and A1c and also review of medication.  His blood sugar today was 179 and his A1c was 8.3%.  He recently had been on some steroids and that did cause some sugar elevation.  He also recently had a gel shot in his right knee.  Patient is otherwise doing well and follows with cardiology and orthopedics.  He will follow-up in this office as needed in 4 to 6 months.    Problem List Items Addressed This Visit    None           Luis Newman, DO

## 2025-07-21 ENCOUNTER — ANTICOAGULATION - WARFARIN VISIT (OUTPATIENT)
Dept: PHARMACY | Facility: CLINIC | Age: 85
End: 2025-07-21
Payer: MEDICARE

## 2025-07-21 DIAGNOSIS — I48.21 PERMANENT ATRIAL FIBRILLATION (MULTI): Primary | ICD-10-CM

## 2025-07-21 LAB
POC INR: 2.8 (ref 0.9–1.1)
POC PROTHROMBIN TIME: ABNORMAL (ref 9.3–12.5)

## 2025-07-21 PROCEDURE — 99212 OFFICE O/P EST SF 10 MIN: CPT | Performed by: PHARMACIST

## 2025-07-21 PROCEDURE — 85610 PROTHROMBIN TIME: CPT | Mod: QW | Performed by: PHARMACIST

## 2025-07-21 NOTE — PROGRESS NOTES
Trell Aguila is a 84 y.o. male with history of atrial fibrillation who presents today for anticoagulation monitoring and adjustment.  INR 2.8 is therapeutic for this patient (goal range 2-3) and is reflective of 22.5 mg TWD  Patient verifies current dosing regimen, patient able to verbally recall dose  Patient reports 0  missed doses since last INR  Last INR 3.2 on 6/24/25 (4 week interval) - was on prednisone at last visit   Patient denies s/sx clotting and/or stroke  Patient denies hematuria, epistaxis, rectal bleeding  Patient denies changes in diet, alcohol, or tobacco use  Vegetable intake consistent from week to week  Reviewed medication list and drug allergies with patient, updated any medication additions or modifications accordingly  Acetaminophen intake: no changes   Patient also denies any pending medical or dental procedures scheduled at this time  Patient was instructed to continue with current therapy of warfarin 22.5mg TWD and RTC 5 weeks    Zuly Barrios, RebecaD, BCPS   7/21/2025 11:44 AM

## 2025-08-20 ENCOUNTER — APPOINTMENT (OUTPATIENT)
Dept: PRIMARY CARE | Facility: CLINIC | Age: 85
End: 2025-08-20
Payer: MEDICARE

## 2025-08-20 VITALS
RESPIRATION RATE: 18 BRPM | TEMPERATURE: 97.9 F | HEART RATE: 71 BPM | HEIGHT: 68 IN | OXYGEN SATURATION: 95 % | SYSTOLIC BLOOD PRESSURE: 112 MMHG | DIASTOLIC BLOOD PRESSURE: 60 MMHG | BODY MASS INDEX: 32.28 KG/M2 | WEIGHT: 213 LBS

## 2025-08-20 DIAGNOSIS — E11.9 DIABETES MELLITUS WITHOUT COMPLICATION: ICD-10-CM

## 2025-08-20 DIAGNOSIS — E11.65 TYPE 2 DIABETES MELLITUS WITH HYPERGLYCEMIA, WITHOUT LONG-TERM CURRENT USE OF INSULIN: Primary | ICD-10-CM

## 2025-08-20 DIAGNOSIS — N13.8 BENIGN PROSTATIC HYPERPLASIA WITH URINARY OBSTRUCTION: ICD-10-CM

## 2025-08-20 DIAGNOSIS — I25.10 CORONARY ARTERY DISEASE INVOLVING NATIVE CORONARY ARTERY OF NATIVE HEART WITHOUT ANGINA PECTORIS: ICD-10-CM

## 2025-08-20 DIAGNOSIS — I48.21 PERMANENT ATRIAL FIBRILLATION (MULTI): ICD-10-CM

## 2025-08-20 DIAGNOSIS — M21.371 FOOT DROP, RIGHT: ICD-10-CM

## 2025-08-20 DIAGNOSIS — F45.21 HYPOCHONDRIASIS: ICD-10-CM

## 2025-08-20 DIAGNOSIS — B37.9 INFECTION DUE TO YEAST: ICD-10-CM

## 2025-08-20 DIAGNOSIS — I48.91 ATRIAL FIBRILLATION, UNSPECIFIED TYPE (MULTI): ICD-10-CM

## 2025-08-20 DIAGNOSIS — B37.9 YEAST INFECTION: ICD-10-CM

## 2025-08-20 DIAGNOSIS — E78.5 DYSLIPIDEMIA: ICD-10-CM

## 2025-08-20 DIAGNOSIS — N40.1 BENIGN PROSTATIC HYPERPLASIA WITH URINARY OBSTRUCTION: ICD-10-CM

## 2025-08-20 DIAGNOSIS — M21.372 LEFT FOOT DROP: ICD-10-CM

## 2025-08-20 LAB — POC FINGERSTICK BLOOD GLUCOSE: 179 MG/DL (ref 70–100)

## 2025-08-20 PROCEDURE — 1160F RVW MEDS BY RX/DR IN RCRD: CPT | Performed by: FAMILY MEDICINE

## 2025-08-20 PROCEDURE — 3074F SYST BP LT 130 MM HG: CPT | Performed by: FAMILY MEDICINE

## 2025-08-20 PROCEDURE — 82962 GLUCOSE BLOOD TEST: CPT | Performed by: FAMILY MEDICINE

## 2025-08-20 PROCEDURE — 99214 OFFICE O/P EST MOD 30 MIN: CPT | Performed by: FAMILY MEDICINE

## 2025-08-20 PROCEDURE — 1159F MED LIST DOCD IN RCRD: CPT | Performed by: FAMILY MEDICINE

## 2025-08-20 PROCEDURE — 1126F AMNT PAIN NOTED NONE PRSNT: CPT | Performed by: FAMILY MEDICINE

## 2025-08-20 PROCEDURE — 3078F DIAST BP <80 MM HG: CPT | Performed by: FAMILY MEDICINE

## 2025-08-20 RX ORDER — ROSUVASTATIN CALCIUM 10 MG/1
10 TABLET, COATED ORAL DAILY
Qty: 90 TABLET | Refills: 3 | Status: SHIPPED | OUTPATIENT
Start: 2025-08-20 | End: 2026-08-20

## 2025-08-20 RX ORDER — NYSTATIN 100000 [USP'U]/ML
500000 SUSPENSION ORAL 2 TIMES DAILY
Qty: 280 ML | Refills: 0 | Status: SHIPPED | OUTPATIENT
Start: 2025-08-20 | End: 2025-08-20 | Stop reason: ENTERED-IN-ERROR

## 2025-08-20 RX ORDER — NYSTATIN 100000 [USP'U]/G
1 POWDER TOPICAL 2 TIMES DAILY
Qty: 60 G | Refills: 3 | Status: SHIPPED | OUTPATIENT
Start: 2025-08-20 | End: 2026-08-20

## 2025-08-20 RX ORDER — WARFARIN SODIUM 5 MG/1
TABLET ORAL
Qty: 70 TABLET | Refills: 1 | Status: SHIPPED | OUTPATIENT
Start: 2025-08-20 | End: 2026-08-20

## 2025-08-20 RX ORDER — TAMSULOSIN HYDROCHLORIDE 0.4 MG/1
0.4 CAPSULE ORAL NIGHTLY
Qty: 90 CAPSULE | Refills: 3 | Status: SHIPPED | OUTPATIENT
Start: 2025-08-20

## 2025-08-20 ASSESSMENT — PAIN SCALES - GENERAL: PAINLEVEL_OUTOF10: 0-NO PAIN

## 2025-08-25 ENCOUNTER — ANTICOAGULATION - WARFARIN VISIT (OUTPATIENT)
Dept: PHARMACY | Facility: CLINIC | Age: 85
End: 2025-08-25
Payer: MEDICARE

## 2025-08-25 DIAGNOSIS — I48.21 PERMANENT ATRIAL FIBRILLATION (MULTI): Primary | ICD-10-CM

## 2025-08-25 LAB
POC INR: 3.6 (ref 0.9–1.1)
POC PROTHROMBIN TIME: ABNORMAL (ref 9.3–12.5)

## 2025-08-25 PROCEDURE — 99212 OFFICE O/P EST SF 10 MIN: CPT

## 2025-08-25 PROCEDURE — 85610 PROTHROMBIN TIME: CPT | Mod: QW

## 2025-09-23 ENCOUNTER — APPOINTMENT (OUTPATIENT)
Dept: PRIMARY CARE | Facility: CLINIC | Age: 85
End: 2025-09-23
Payer: MEDICARE

## 2025-10-21 ENCOUNTER — APPOINTMENT (OUTPATIENT)
Dept: PRIMARY CARE | Facility: CLINIC | Age: 85
End: 2025-10-21
Payer: MEDICARE

## 2025-11-19 ENCOUNTER — APPOINTMENT (OUTPATIENT)
Dept: PRIMARY CARE | Facility: CLINIC | Age: 85
End: 2025-11-19
Payer: MEDICARE

## 2025-12-16 ENCOUNTER — APPOINTMENT (OUTPATIENT)
Dept: PRIMARY CARE | Facility: CLINIC | Age: 85
End: 2025-12-16
Payer: MEDICARE

## 2026-01-21 ENCOUNTER — APPOINTMENT (OUTPATIENT)
Dept: PRIMARY CARE | Facility: CLINIC | Age: 86
End: 2026-01-21
Payer: MEDICARE

## 2026-02-18 ENCOUNTER — APPOINTMENT (OUTPATIENT)
Dept: PRIMARY CARE | Facility: CLINIC | Age: 86
End: 2026-02-18
Payer: MEDICARE

## 2026-03-18 ENCOUNTER — APPOINTMENT (OUTPATIENT)
Dept: PRIMARY CARE | Facility: CLINIC | Age: 86
End: 2026-03-18
Payer: MEDICARE

## 2026-04-15 ENCOUNTER — APPOINTMENT (OUTPATIENT)
Dept: PRIMARY CARE | Facility: CLINIC | Age: 86
End: 2026-04-15
Payer: MEDICARE

## 2026-05-20 ENCOUNTER — APPOINTMENT (OUTPATIENT)
Dept: PRIMARY CARE | Facility: CLINIC | Age: 86
End: 2026-05-20
Payer: MEDICARE

## 2026-06-17 ENCOUNTER — APPOINTMENT (OUTPATIENT)
Dept: PRIMARY CARE | Facility: CLINIC | Age: 86
End: 2026-06-17
Payer: MEDICARE

## 2026-07-15 ENCOUNTER — APPOINTMENT (OUTPATIENT)
Dept: PRIMARY CARE | Facility: CLINIC | Age: 86
End: 2026-07-15
Payer: MEDICARE

## 2026-08-19 ENCOUNTER — APPOINTMENT (OUTPATIENT)
Dept: PRIMARY CARE | Facility: CLINIC | Age: 86
End: 2026-08-19
Payer: MEDICARE

## 2026-09-16 ENCOUNTER — APPOINTMENT (OUTPATIENT)
Dept: PRIMARY CARE | Facility: CLINIC | Age: 86
End: 2026-09-16
Payer: MEDICARE